# Patient Record
Sex: FEMALE | Race: WHITE | Employment: UNEMPLOYED | URBAN - NONMETROPOLITAN AREA
[De-identification: names, ages, dates, MRNs, and addresses within clinical notes are randomized per-mention and may not be internally consistent; named-entity substitution may affect disease eponyms.]

---

## 2017-01-10 ENCOUNTER — OFFICE VISIT (OUTPATIENT)
Dept: INTERNAL MEDICINE | Age: 82
End: 2017-01-10

## 2017-01-10 VITALS
HEART RATE: 68 BPM | RESPIRATION RATE: 14 BRPM | BODY MASS INDEX: 19.7 KG/M2 | HEIGHT: 64 IN | WEIGHT: 115.4 LBS | TEMPERATURE: 98 F | SYSTOLIC BLOOD PRESSURE: 120 MMHG | DIASTOLIC BLOOD PRESSURE: 62 MMHG | OXYGEN SATURATION: 98 %

## 2017-01-10 DIAGNOSIS — J40 BRONCHITIS: Primary | ICD-10-CM

## 2017-01-10 PROCEDURE — 99213 OFFICE O/P EST LOW 20 MIN: CPT | Performed by: NURSE PRACTITIONER

## 2017-01-10 RX ORDER — AZITHROMYCIN 250 MG/1
TABLET, FILM COATED ORAL
Qty: 1 PACKET | Refills: 0 | Status: SHIPPED | OUTPATIENT
Start: 2017-01-10 | End: 2017-02-22

## 2017-01-10 ASSESSMENT — ENCOUNTER SYMPTOMS
PHOTOPHOBIA: 0
DIARRHEA: 0
VOICE CHANGE: 0
VOMITING: 0
SORE THROAT: 1
SHORTNESS OF BREATH: 0
NAUSEA: 0
RHINORRHEA: 1
SINUS PRESSURE: 0
COUGH: 1
WHEEZING: 0

## 2017-02-06 DIAGNOSIS — E78.5 HYPERLIPIDEMIA: ICD-10-CM

## 2017-02-06 RX ORDER — SIMVASTATIN 10 MG
TABLET ORAL
Qty: 90 TABLET | Refills: 3 | Status: SHIPPED | OUTPATIENT
Start: 2017-02-06 | End: 2018-02-19 | Stop reason: SDUPTHER

## 2017-02-22 ENCOUNTER — OFFICE VISIT (OUTPATIENT)
Dept: INTERNAL MEDICINE | Age: 82
End: 2017-02-22

## 2017-02-22 VITALS
DIASTOLIC BLOOD PRESSURE: 72 MMHG | BODY MASS INDEX: 19.39 KG/M2 | WEIGHT: 113.6 LBS | HEART RATE: 64 BPM | HEIGHT: 64 IN | SYSTOLIC BLOOD PRESSURE: 104 MMHG

## 2017-02-22 DIAGNOSIS — M81.0 OSTEOPOROSIS: ICD-10-CM

## 2017-02-22 DIAGNOSIS — E78.5 HYPERLIPIDEMIA, UNSPECIFIED HYPERLIPIDEMIA TYPE: ICD-10-CM

## 2017-02-22 DIAGNOSIS — I10 ESSENTIAL HYPERTENSION: Primary | ICD-10-CM

## 2017-02-22 PROCEDURE — 4040F PNEUMOC VAC/ADMIN/RCVD: CPT | Performed by: INTERNAL MEDICINE

## 2017-02-22 PROCEDURE — G8399 PT W/DXA RESULTS DOCUMENT: HCPCS | Performed by: INTERNAL MEDICINE

## 2017-02-22 PROCEDURE — 1123F ACP DISCUSS/DSCN MKR DOCD: CPT | Performed by: INTERNAL MEDICINE

## 2017-02-22 PROCEDURE — 1036F TOBACCO NON-USER: CPT | Performed by: INTERNAL MEDICINE

## 2017-02-22 PROCEDURE — G8419 CALC BMI OUT NRM PARAM NOF/U: HCPCS | Performed by: INTERNAL MEDICINE

## 2017-02-22 PROCEDURE — 4005F PHARM THX FOR OP RXD: CPT | Performed by: INTERNAL MEDICINE

## 2017-02-22 PROCEDURE — G8427 DOCREV CUR MEDS BY ELIG CLIN: HCPCS | Performed by: INTERNAL MEDICINE

## 2017-02-22 PROCEDURE — 99214 OFFICE O/P EST MOD 30 MIN: CPT | Performed by: INTERNAL MEDICINE

## 2017-02-22 PROCEDURE — 1090F PRES/ABSN URINE INCON ASSESS: CPT | Performed by: INTERNAL MEDICINE

## 2017-02-22 PROCEDURE — G8484 FLU IMMUNIZE NO ADMIN: HCPCS | Performed by: INTERNAL MEDICINE

## 2017-02-22 ASSESSMENT — ENCOUNTER SYMPTOMS
DIARRHEA: 0
NAUSEA: 0
CONSTIPATION: 0
BACK PAIN: 0
BLOOD IN STOOL: 0
COUGH: 0
VOMITING: 0
EYE PAIN: 0
ABDOMINAL PAIN: 0
SHORTNESS OF BREATH: 0

## 2017-04-06 RX ORDER — CLONIDINE HYDROCHLORIDE 0.2 MG/1
TABLET ORAL
Qty: 180 TABLET | Refills: 3 | Status: SHIPPED | OUTPATIENT
Start: 2017-04-06 | End: 2018-04-06 | Stop reason: SDUPTHER

## 2017-05-11 DIAGNOSIS — F41.1 ANXIETY NEUROSIS: ICD-10-CM

## 2017-05-11 DIAGNOSIS — M81.0 OSTEOPOROSIS: ICD-10-CM

## 2017-05-11 RX ORDER — ALENDRONATE SODIUM 70 MG/1
TABLET ORAL
Qty: 12 TABLET | Refills: 3 | Status: SHIPPED | OUTPATIENT
Start: 2017-05-11 | End: 2018-06-22 | Stop reason: SDUPTHER

## 2017-05-11 RX ORDER — PAROXETINE HYDROCHLORIDE 20 MG/1
TABLET, FILM COATED ORAL
Qty: 90 TABLET | Refills: 3 | Status: SHIPPED | OUTPATIENT
Start: 2017-05-11 | End: 2018-06-22 | Stop reason: SDUPTHER

## 2017-07-10 ENCOUNTER — OFFICE VISIT (OUTPATIENT)
Dept: PRIMARY CARE CLINIC | Age: 82
End: 2017-07-10
Payer: MEDICARE

## 2017-07-10 VITALS
RESPIRATION RATE: 16 BRPM | SYSTOLIC BLOOD PRESSURE: 100 MMHG | TEMPERATURE: 97.3 F | HEIGHT: 64 IN | DIASTOLIC BLOOD PRESSURE: 60 MMHG | WEIGHT: 113 LBS | HEART RATE: 52 BPM | BODY MASS INDEX: 19.29 KG/M2 | OXYGEN SATURATION: 97 %

## 2017-07-10 DIAGNOSIS — L03.011 PARONYCHIA OF RIGHT THUMB: Primary | ICD-10-CM

## 2017-07-10 PROCEDURE — 4040F PNEUMOC VAC/ADMIN/RCVD: CPT | Performed by: FAMILY MEDICINE

## 2017-07-10 PROCEDURE — G8420 CALC BMI NORM PARAMETERS: HCPCS | Performed by: FAMILY MEDICINE

## 2017-07-10 PROCEDURE — 1036F TOBACCO NON-USER: CPT | Performed by: FAMILY MEDICINE

## 2017-07-10 PROCEDURE — 1123F ACP DISCUSS/DSCN MKR DOCD: CPT | Performed by: FAMILY MEDICINE

## 2017-07-10 PROCEDURE — G8399 PT W/DXA RESULTS DOCUMENT: HCPCS | Performed by: FAMILY MEDICINE

## 2017-07-10 PROCEDURE — G8427 DOCREV CUR MEDS BY ELIG CLIN: HCPCS | Performed by: FAMILY MEDICINE

## 2017-07-10 PROCEDURE — 99213 OFFICE O/P EST LOW 20 MIN: CPT | Performed by: FAMILY MEDICINE

## 2017-07-10 PROCEDURE — 1090F PRES/ABSN URINE INCON ASSESS: CPT | Performed by: FAMILY MEDICINE

## 2017-07-10 RX ORDER — DOXYCYCLINE HYCLATE 100 MG
100 TABLET ORAL 2 TIMES DAILY
Qty: 20 TABLET | Refills: 0 | Status: SHIPPED | OUTPATIENT
Start: 2017-07-10 | End: 2017-08-24 | Stop reason: ALTCHOICE

## 2017-07-10 ASSESSMENT — ENCOUNTER SYMPTOMS
GASTROINTESTINAL NEGATIVE: 1
COLOR CHANGE: 1
RESPIRATORY NEGATIVE: 1
EYES NEGATIVE: 1

## 2017-08-17 ENCOUNTER — HOSPITAL ENCOUNTER (OUTPATIENT)
Dept: LAB | Age: 82
Discharge: HOME | End: 2017-08-17
Payer: MEDICARE

## 2017-08-17 DIAGNOSIS — E78.5 HYPERLIPIDEMIA, UNSPECIFIED HYPERLIPIDEMIA TYPE: ICD-10-CM

## 2017-08-17 DIAGNOSIS — M81.0 OSTEOPOROSIS: ICD-10-CM

## 2017-08-17 DIAGNOSIS — I10 ESSENTIAL HYPERTENSION: ICD-10-CM

## 2017-08-17 LAB
ALBUMIN SERPL-MCNC: 4.2 G/DL (ref 3.5–5.2)
ALBUMIN/GLOBULIN RATIO: 1.1 (ref 1–2.5)
ALP BLD-CCNC: 66 U/L (ref 35–104)
ALT SERPL-CCNC: 13 U/L (ref 5–33)
ANION GAP SERPL CALCULATED.3IONS-SCNC: 11 MMOL/L (ref 9–17)
AST SERPL-CCNC: 30 U/L
BILIRUB SERPL-MCNC: 0.48 MG/DL (ref 0.3–1.2)
BUN BLDV-MCNC: 24 MG/DL (ref 8–23)
BUN/CREAT BLD: 28 (ref 9–20)
CALCIUM SERPL-MCNC: 9.7 MG/DL (ref 8.6–10.4)
CHLORIDE BLD-SCNC: 101 MMOL/L (ref 98–107)
CHOLESTEROL/HDL RATIO: 2.4
CHOLESTEROL: 189 MG/DL
CO2: 30 MMOL/L (ref 20–31)
CREAT SERPL-MCNC: 0.85 MG/DL (ref 0.5–0.9)
GFR AFRICAN AMERICAN: >60 ML/MIN
GFR NON-AFRICAN AMERICAN: >60 ML/MIN
GFR SERPL CREATININE-BSD FRML MDRD: ABNORMAL ML/MIN/{1.73_M2}
GFR SERPL CREATININE-BSD FRML MDRD: ABNORMAL ML/MIN/{1.73_M2}
GLUCOSE BLD-MCNC: 104 MG/DL (ref 70–99)
HDLC SERPL-MCNC: 80 MG/DL
LDL CHOLESTEROL: 84 MG/DL (ref 0–130)
POTASSIUM SERPL-SCNC: 4.5 MMOL/L (ref 3.7–5.3)
SODIUM BLD-SCNC: 142 MMOL/L (ref 135–144)
TOTAL PROTEIN: 8.1 G/DL (ref 6.4–8.3)
TRIGL SERPL-MCNC: 125 MG/DL
VLDLC SERPL CALC-MCNC: NORMAL MG/DL (ref 1–30)

## 2017-08-17 PROCEDURE — 80053 COMPREHEN METABOLIC PANEL: CPT

## 2017-08-17 PROCEDURE — 36415 COLL VENOUS BLD VENIPUNCTURE: CPT

## 2017-08-17 PROCEDURE — 80061 LIPID PANEL: CPT

## 2017-08-24 ENCOUNTER — OFFICE VISIT (OUTPATIENT)
Dept: INTERNAL MEDICINE | Age: 82
End: 2017-08-24
Payer: MEDICARE

## 2017-08-24 VITALS
HEIGHT: 64 IN | HEART RATE: 68 BPM | SYSTOLIC BLOOD PRESSURE: 106 MMHG | DIASTOLIC BLOOD PRESSURE: 68 MMHG | BODY MASS INDEX: 19.29 KG/M2 | WEIGHT: 113 LBS | RESPIRATION RATE: 16 BRPM

## 2017-08-24 DIAGNOSIS — I10 ESSENTIAL HYPERTENSION: Primary | ICD-10-CM

## 2017-08-24 DIAGNOSIS — E78.5 HYPERLIPIDEMIA, UNSPECIFIED HYPERLIPIDEMIA TYPE: ICD-10-CM

## 2017-08-24 DIAGNOSIS — F41.1 ANXIETY NEUROSIS: ICD-10-CM

## 2017-08-24 DIAGNOSIS — M81.0 OSTEOPOROSIS, UNSPECIFIED OSTEOPOROSIS TYPE, UNSPECIFIED PATHOLOGICAL FRACTURE PRESENCE: ICD-10-CM

## 2017-08-24 DIAGNOSIS — Z12.31 ENCOUNTER FOR SCREENING MAMMOGRAM FOR MALIGNANT NEOPLASM OF BREAST: ICD-10-CM

## 2017-08-24 PROCEDURE — G8420 CALC BMI NORM PARAMETERS: HCPCS | Performed by: INTERNAL MEDICINE

## 2017-08-24 PROCEDURE — 4040F PNEUMOC VAC/ADMIN/RCVD: CPT | Performed by: INTERNAL MEDICINE

## 2017-08-24 PROCEDURE — 1123F ACP DISCUSS/DSCN MKR DOCD: CPT | Performed by: INTERNAL MEDICINE

## 2017-08-24 PROCEDURE — 1090F PRES/ABSN URINE INCON ASSESS: CPT | Performed by: INTERNAL MEDICINE

## 2017-08-24 PROCEDURE — 1036F TOBACCO NON-USER: CPT | Performed by: INTERNAL MEDICINE

## 2017-08-24 PROCEDURE — 4005F PHARM THX FOR OP RXD: CPT | Performed by: INTERNAL MEDICINE

## 2017-08-24 PROCEDURE — 99214 OFFICE O/P EST MOD 30 MIN: CPT | Performed by: INTERNAL MEDICINE

## 2017-08-24 PROCEDURE — G8399 PT W/DXA RESULTS DOCUMENT: HCPCS | Performed by: INTERNAL MEDICINE

## 2017-08-24 PROCEDURE — G8427 DOCREV CUR MEDS BY ELIG CLIN: HCPCS | Performed by: INTERNAL MEDICINE

## 2017-08-24 ASSESSMENT — PATIENT HEALTH QUESTIONNAIRE - PHQ9
2. FEELING DOWN, DEPRESSED OR HOPELESS: 0
SUM OF ALL RESPONSES TO PHQ9 QUESTIONS 1 & 2: 0
1. LITTLE INTEREST OR PLEASURE IN DOING THINGS: 0
SUM OF ALL RESPONSES TO PHQ QUESTIONS 1-9: 0

## 2017-08-24 ASSESSMENT — ENCOUNTER SYMPTOMS
ABDOMINAL PAIN: 0
NAUSEA: 0
BLOOD IN STOOL: 0
VOMITING: 0
EYE PAIN: 0
SHORTNESS OF BREATH: 0
BACK PAIN: 0
DIARRHEA: 0
COUGH: 0
CONSTIPATION: 0

## 2017-09-19 ENCOUNTER — HOSPITAL ENCOUNTER (OUTPATIENT)
Dept: MAMMOGRAPHY | Age: 82
Discharge: HOME | End: 2017-09-19
Payer: MEDICARE

## 2017-09-19 DIAGNOSIS — Z12.31 ENCOUNTER FOR SCREENING MAMMOGRAM FOR MALIGNANT NEOPLASM OF BREAST: ICD-10-CM

## 2017-09-19 PROCEDURE — G0202 SCR MAMMO BI INCL CAD: HCPCS

## 2017-11-02 ENCOUNTER — NURSE ONLY (OUTPATIENT)
Dept: LAB | Age: 82
End: 2017-11-02
Payer: MEDICARE

## 2017-11-02 DIAGNOSIS — Z23 NEED FOR VACCINATION: Primary | ICD-10-CM

## 2017-11-02 PROCEDURE — G0008 ADMIN INFLUENZA VIRUS VAC: HCPCS | Performed by: INTERNAL MEDICINE

## 2017-11-02 PROCEDURE — 90662 IIV NO PRSV INCREASED AG IM: CPT | Performed by: INTERNAL MEDICINE

## 2017-11-02 NOTE — PROGRESS NOTES
Have you had an allergic reaction to the flu (influenza) shot? no  Are you allergic to eggs or any component of the flu vaccine? no  Do you have a history of Guillain-Paullina Syndrome (GBS), which is paralysis after receiving the flu vaccine? no  Are you feeling well today? yes  Flu vaccine given as ordered. Patient tolerated it well. No questions re: VIS information.

## 2018-03-01 ENCOUNTER — OFFICE VISIT (OUTPATIENT)
Dept: INTERNAL MEDICINE | Age: 83
End: 2018-03-01
Payer: MEDICARE

## 2018-03-01 VITALS
HEART RATE: 64 BPM | DIASTOLIC BLOOD PRESSURE: 72 MMHG | HEIGHT: 64 IN | SYSTOLIC BLOOD PRESSURE: 120 MMHG | RESPIRATION RATE: 16 BRPM | WEIGHT: 114 LBS | BODY MASS INDEX: 19.46 KG/M2

## 2018-03-01 DIAGNOSIS — E78.5 HYPERLIPIDEMIA, UNSPECIFIED HYPERLIPIDEMIA TYPE: ICD-10-CM

## 2018-03-01 DIAGNOSIS — I10 ESSENTIAL HYPERTENSION: Primary | ICD-10-CM

## 2018-03-01 DIAGNOSIS — M81.0 OSTEOPOROSIS, UNSPECIFIED OSTEOPOROSIS TYPE, UNSPECIFIED PATHOLOGICAL FRACTURE PRESENCE: ICD-10-CM

## 2018-03-01 PROCEDURE — G8427 DOCREV CUR MEDS BY ELIG CLIN: HCPCS | Performed by: INTERNAL MEDICINE

## 2018-03-01 PROCEDURE — 99214 OFFICE O/P EST MOD 30 MIN: CPT | Performed by: INTERNAL MEDICINE

## 2018-03-01 PROCEDURE — 4040F PNEUMOC VAC/ADMIN/RCVD: CPT | Performed by: INTERNAL MEDICINE

## 2018-03-01 PROCEDURE — 1036F TOBACCO NON-USER: CPT | Performed by: INTERNAL MEDICINE

## 2018-03-01 PROCEDURE — G8399 PT W/DXA RESULTS DOCUMENT: HCPCS | Performed by: INTERNAL MEDICINE

## 2018-03-01 PROCEDURE — G8420 CALC BMI NORM PARAMETERS: HCPCS | Performed by: INTERNAL MEDICINE

## 2018-03-01 PROCEDURE — 1123F ACP DISCUSS/DSCN MKR DOCD: CPT | Performed by: INTERNAL MEDICINE

## 2018-03-01 PROCEDURE — 1090F PRES/ABSN URINE INCON ASSESS: CPT | Performed by: INTERNAL MEDICINE

## 2018-03-01 PROCEDURE — G8484 FLU IMMUNIZE NO ADMIN: HCPCS | Performed by: INTERNAL MEDICINE

## 2018-03-01 ASSESSMENT — ENCOUNTER SYMPTOMS
COUGH: 0
BACK PAIN: 0
ABDOMINAL PAIN: 0
DIARRHEA: 0
SHORTNESS OF BREATH: 0
NAUSEA: 0
VOMITING: 0
CONSTIPATION: 0
BLOOD IN STOOL: 0
EYE PAIN: 0

## 2018-03-01 NOTE — PROGRESS NOTES
MCG/ACT nasal spray 1 spray each nostril daily. 1 Bottle 2    calcium carbonate-vitamin D (CALTRATE 600+D) 600-400 MG-UNIT TABS per tab Take 1 tablet by mouth 2 times daily.  Biotin 5000 MCG TABS Take 5,000 mcg by mouth daily.  Multiple Vitamins-Minerals (MULTIVITAMIN PO) Take 1 tablet by mouth daily.  Ibuprofen (ADVIL PO) Take 400 mg by mouth 3 times daily as needed.  aspirin 81 MG tablet Take 81 mg by mouth daily. No current facility-administered medications for this visit. Allergies   Allergen Reactions    Bactrim [Sulfamethoxazole-Trimethoprim]     Flagyl [Metronidazole]        Health Maintenance   Topic Date Due    Shingles Vaccine (1 of 2 - 2 Dose Series) 09/23/1982    DTaP/Tdap/Td vaccine (1 - Tdap) 08/24/2018 (Originally 9/23/1951)    DEXA (modify frequency per FRAX score)  Completed    Flu vaccine  Completed    Pneumococcal low/med risk  Completed       Subjective:      Review of Systems   Constitutional: Negative for chills and fever. HENT: Negative for hearing loss. Eyes: Negative for pain and visual disturbance. Respiratory: Negative for cough and shortness of breath. Cardiovascular: Negative for chest pain, palpitations and leg swelling. Gastrointestinal: Negative for abdominal pain, blood in stool, constipation, diarrhea, nausea and vomiting. Endocrine: Negative for cold intolerance, polydipsia and polyuria. Genitourinary: Negative for difficulty urinating, dysuria and hematuria. Musculoskeletal: Negative for arthralgias, back pain, gait problem and neck pain. Skin: Negative for pallor and rash. Neurological: Negative for dizziness, weakness, numbness and headaches. Hematological: Negative for adenopathy. Does not bruise/bleed easily. Psychiatric/Behavioral: Negative for confusion. The patient is not nervous/anxious. Objective:     Physical Exam   Constitutional: She is oriented to person, place, and time.  She appears

## 2018-04-06 RX ORDER — CLONIDINE HYDROCHLORIDE 0.2 MG/1
TABLET ORAL
Qty: 180 TABLET | Refills: 3 | Status: SHIPPED | OUTPATIENT
Start: 2018-04-06 | End: 2019-04-08 | Stop reason: SDUPTHER

## 2018-04-09 ENCOUNTER — HOSPITAL ENCOUNTER (OUTPATIENT)
Age: 83
Setting detail: OBSERVATION
Discharge: HOME | End: 2018-04-10
Attending: INTERNAL MEDICINE | Admitting: INTERNAL MEDICINE
Payer: MEDICARE

## 2018-04-09 ENCOUNTER — OFFICE VISIT (OUTPATIENT)
Dept: PRIMARY CARE CLINIC | Age: 83
End: 2018-04-09
Payer: MEDICARE

## 2018-04-09 ENCOUNTER — TELEPHONE (OUTPATIENT)
Dept: FAMILY MEDICINE CLINIC | Age: 83
End: 2018-04-09

## 2018-04-09 ENCOUNTER — HOSPITAL ENCOUNTER (OUTPATIENT)
Dept: GENERAL RADIOLOGY | Age: 83
Discharge: HOME | End: 2018-04-11
Payer: MEDICARE

## 2018-04-09 VITALS
HEIGHT: 64 IN | WEIGHT: 116 LBS | SYSTOLIC BLOOD PRESSURE: 132 MMHG | BODY MASS INDEX: 19.81 KG/M2 | OXYGEN SATURATION: 95 % | HEART RATE: 81 BPM | DIASTOLIC BLOOD PRESSURE: 78 MMHG | RESPIRATION RATE: 16 BRPM

## 2018-04-09 DIAGNOSIS — S40.021A ARM BRUISE, RIGHT, INITIAL ENCOUNTER: ICD-10-CM

## 2018-04-09 DIAGNOSIS — M25.551 PAIN OF RIGHT HIP JOINT: Primary | ICD-10-CM

## 2018-04-09 DIAGNOSIS — M25.551 PAIN OF RIGHT HIP JOINT: ICD-10-CM

## 2018-04-09 DIAGNOSIS — S32.591A PUBIC RAMUS FRACTURE, RIGHT, CLOSED, INITIAL ENCOUNTER (HCC): Primary | ICD-10-CM

## 2018-04-09 DIAGNOSIS — M81.0 OSTEOPOROSIS, UNSPECIFIED OSTEOPOROSIS TYPE, UNSPECIFIED PATHOLOGICAL FRACTURE PRESENCE: ICD-10-CM

## 2018-04-09 LAB
-: NORMAL
ABSOLUTE EOS #: 0.1 K/UL (ref 0–0.4)
ABSOLUTE IMMATURE GRANULOCYTE: ABNORMAL K/UL (ref 0–0.3)
ABSOLUTE LYMPH #: 0.7 K/UL (ref 1–4.8)
ABSOLUTE MONO #: 0.5 K/UL (ref 0.1–1.2)
AMORPHOUS: NORMAL
ANION GAP SERPL CALCULATED.3IONS-SCNC: 8 MMOL/L (ref 9–17)
BACTERIA: NORMAL
BASOPHILS # BLD: 1 % (ref 0–1)
BASOPHILS ABSOLUTE: 0 K/UL (ref 0–0.2)
BILIRUBIN URINE: NEGATIVE
BUN BLDV-MCNC: 24 MG/DL (ref 8–23)
BUN/CREAT BLD: 32 (ref 9–20)
CALCIUM SERPL-MCNC: 9.9 MG/DL (ref 8.6–10.4)
CASTS UA: NORMAL /LPF (ref 0–2)
CHLORIDE BLD-SCNC: 101 MMOL/L (ref 98–107)
CO2: 32 MMOL/L (ref 20–31)
COLOR: ABNORMAL
COMMENT UA: ABNORMAL
CREAT SERPL-MCNC: 0.74 MG/DL (ref 0.5–0.9)
CRYSTALS, UA: NORMAL /HPF
DIFFERENTIAL TYPE: ABNORMAL
EOSINOPHILS RELATIVE PERCENT: 1 % (ref 1–7)
EPITHELIAL CELLS UA: NORMAL /HPF (ref 0–5)
GFR AFRICAN AMERICAN: >60 ML/MIN
GFR NON-AFRICAN AMERICAN: >60 ML/MIN
GFR SERPL CREATININE-BSD FRML MDRD: ABNORMAL ML/MIN/{1.73_M2}
GFR SERPL CREATININE-BSD FRML MDRD: ABNORMAL ML/MIN/{1.73_M2}
GLUCOSE BLD-MCNC: 102 MG/DL (ref 70–99)
GLUCOSE URINE: NEGATIVE
HCT VFR BLD CALC: 37.8 % (ref 36–46)
HEMOGLOBIN: 12.7 G/DL (ref 12–16)
IMMATURE GRANULOCYTES: ABNORMAL %
INR BLD: 1
KETONES, URINE: NEGATIVE
LEUKOCYTE ESTERASE, URINE: NEGATIVE
LYMPHOCYTES # BLD: 10 % (ref 16–46)
MAGNESIUM: 2.3 MG/DL (ref 1.6–2.6)
MCH RBC QN AUTO: 31.2 PG (ref 26–34)
MCHC RBC AUTO-ENTMCNC: 33.5 G/DL (ref 31–37)
MCV RBC AUTO: 93.1 FL (ref 80–100)
MONOCYTES # BLD: 7 % (ref 4–11)
MUCUS: NORMAL
NITRITE, URINE: NEGATIVE
NRBC AUTOMATED: ABNORMAL PER 100 WBC
OTHER OBSERVATIONS UA: NORMAL
PARTIAL THROMBOPLASTIN TIME: 24.7 SEC (ref 27–35)
PDW BLD-RTO: 13.1 % (ref 11–14.5)
PH UA: 7 (ref 5–6)
PLATELET # BLD: 142 K/UL (ref 140–450)
PLATELET ESTIMATE: ABNORMAL
PMV BLD AUTO: 10.2 FL (ref 6–12)
POTASSIUM SERPL-SCNC: 4.7 MMOL/L (ref 3.7–5.3)
PROTEIN UA: NEGATIVE
PROTHROMBIN TIME: 10.4 SEC (ref 9.4–11.3)
RBC # BLD: 4.07 M/UL (ref 4–5.2)
RBC # BLD: ABNORMAL 10*6/UL
RBC UA: NORMAL /HPF (ref 0–4)
RENAL EPITHELIAL, UA: NORMAL /HPF
SEG NEUTROPHILS: 81 % (ref 43–77)
SEGMENTED NEUTROPHILS ABSOLUTE COUNT: 5.9 K/UL (ref 1.8–7.7)
SODIUM BLD-SCNC: 141 MMOL/L (ref 135–144)
SPECIFIC GRAVITY UA: 1.01 (ref 1.01–1.02)
TRICHOMONAS: NORMAL
TURBIDITY: ABNORMAL
URINE HGB: NEGATIVE
UROBILINOGEN, URINE: NORMAL
WBC # BLD: 7.2 K/UL (ref 3.5–11)
WBC # BLD: ABNORMAL 10*3/UL
WBC UA: NORMAL /HPF (ref 0–4)
YEAST: NORMAL

## 2018-04-09 PROCEDURE — 80048 BASIC METABOLIC PNL TOTAL CA: CPT

## 2018-04-09 PROCEDURE — 36415 COLL VENOUS BLD VENIPUNCTURE: CPT

## 2018-04-09 PROCEDURE — G0378 HOSPITAL OBSERVATION PER HR: HCPCS

## 2018-04-09 PROCEDURE — 1090F PRES/ABSN URINE INCON ASSESS: CPT | Performed by: FAMILY MEDICINE

## 2018-04-09 PROCEDURE — 73502 X-RAY EXAM HIP UNI 2-3 VIEWS: CPT

## 2018-04-09 PROCEDURE — 4040F PNEUMOC VAC/ADMIN/RCVD: CPT | Performed by: FAMILY MEDICINE

## 2018-04-09 PROCEDURE — G8399 PT W/DXA RESULTS DOCUMENT: HCPCS | Performed by: FAMILY MEDICINE

## 2018-04-09 PROCEDURE — 85730 THROMBOPLASTIN TIME PARTIAL: CPT

## 2018-04-09 PROCEDURE — 81001 URINALYSIS AUTO W/SCOPE: CPT

## 2018-04-09 PROCEDURE — 1123F ACP DISCUSS/DSCN MKR DOCD: CPT | Performed by: FAMILY MEDICINE

## 2018-04-09 PROCEDURE — 83735 ASSAY OF MAGNESIUM: CPT

## 2018-04-09 PROCEDURE — 85025 COMPLETE CBC W/AUTO DIFF WBC: CPT

## 2018-04-09 PROCEDURE — 6370000000 HC RX 637 (ALT 250 FOR IP): Performed by: INTERNAL MEDICINE

## 2018-04-09 PROCEDURE — G8420 CALC BMI NORM PARAMETERS: HCPCS | Performed by: FAMILY MEDICINE

## 2018-04-09 PROCEDURE — G8427 DOCREV CUR MEDS BY ELIG CLIN: HCPCS | Performed by: FAMILY MEDICINE

## 2018-04-09 PROCEDURE — 85610 PROTHROMBIN TIME: CPT

## 2018-04-09 PROCEDURE — 99220 PR INITIAL OBSERVATION CARE/DAY 70 MINUTES: CPT | Performed by: INTERNAL MEDICINE

## 2018-04-09 PROCEDURE — 1036F TOBACCO NON-USER: CPT | Performed by: FAMILY MEDICINE

## 2018-04-09 PROCEDURE — 99214 OFFICE O/P EST MOD 30 MIN: CPT | Performed by: FAMILY MEDICINE

## 2018-04-09 RX ORDER — MULTIVITAMIN WITH FOLIC ACID 400 MCG
1 TABLET ORAL DAILY
Status: DISCONTINUED | OUTPATIENT
Start: 2018-04-09 | End: 2018-04-10 | Stop reason: HOSPADM

## 2018-04-09 RX ORDER — CLONIDINE HYDROCHLORIDE 0.2 MG/1
0.2 TABLET ORAL 2 TIMES DAILY
Status: DISCONTINUED | OUTPATIENT
Start: 2018-04-09 | End: 2018-04-10 | Stop reason: HOSPADM

## 2018-04-09 RX ORDER — FLUTICASONE PROPIONATE 50 MCG
1 SPRAY, SUSPENSION (ML) NASAL DAILY
Status: DISCONTINUED | OUTPATIENT
Start: 2018-04-09 | End: 2018-04-10 | Stop reason: HOSPADM

## 2018-04-09 RX ORDER — PAROXETINE HYDROCHLORIDE 20 MG/1
20 TABLET, FILM COATED ORAL DAILY
Status: DISCONTINUED | OUTPATIENT
Start: 2018-04-09 | End: 2018-04-10 | Stop reason: HOSPADM

## 2018-04-09 RX ORDER — CALCIUM CARB/VITAMIN D3/VIT K1 500-500-40
1 TABLET,CHEWABLE ORAL DAILY
Status: DISCONTINUED | OUTPATIENT
Start: 2018-04-09 | End: 2018-04-09

## 2018-04-09 RX ORDER — SIMVASTATIN 10 MG
10 TABLET ORAL NIGHTLY
Status: DISCONTINUED | OUTPATIENT
Start: 2018-04-09 | End: 2018-04-10 | Stop reason: HOSPADM

## 2018-04-09 RX ORDER — ANTIARTHRITIC COMBINATION NO.2 900 MG
5000 TABLET ORAL DAILY
Status: DISCONTINUED | OUTPATIENT
Start: 2018-04-09 | End: 2018-04-10 | Stop reason: HOSPADM

## 2018-04-09 RX ORDER — HYDROCODONE BITARTRATE AND ACETAMINOPHEN 5; 325 MG/1; MG/1
2 TABLET ORAL EVERY 4 HOURS PRN
Status: DISCONTINUED | OUTPATIENT
Start: 2018-04-09 | End: 2018-04-10 | Stop reason: HOSPADM

## 2018-04-09 RX ORDER — ACETAMINOPHEN 325 MG/1
650 TABLET ORAL EVERY 4 HOURS PRN
Status: DISCONTINUED | OUTPATIENT
Start: 2018-04-09 | End: 2018-04-10 | Stop reason: HOSPADM

## 2018-04-09 RX ORDER — HYDROCODONE BITARTRATE AND ACETAMINOPHEN 5; 325 MG/1; MG/1
1 TABLET ORAL EVERY 4 HOURS PRN
Status: DISCONTINUED | OUTPATIENT
Start: 2018-04-09 | End: 2018-04-10 | Stop reason: HOSPADM

## 2018-04-09 RX ADMIN — HYDROCODONE BITARTRATE AND ACETAMINOPHEN 1 TABLET: 5; 325 TABLET ORAL at 14:58

## 2018-04-09 RX ADMIN — Medication 10 MG: at 21:36

## 2018-04-09 RX ADMIN — PAROXETINE HYDROCHLORIDE 20 MG: 20 TABLET, FILM COATED ORAL at 21:36

## 2018-04-09 RX ADMIN — CLONIDINE HYDROCHLORIDE 0.2 MG: 0.2 TABLET ORAL at 21:35

## 2018-04-09 ASSESSMENT — PAIN DESCRIPTION - PAIN TYPE
TYPE: ACUTE PAIN
TYPE: ACUTE PAIN

## 2018-04-09 ASSESSMENT — PAIN SCALES - GENERAL
PAINLEVEL_OUTOF10: 0
PAINLEVEL_OUTOF10: 0
PAINLEVEL_OUTOF10: 7
PAINLEVEL_OUTOF10: 5

## 2018-04-09 ASSESSMENT — PAIN DESCRIPTION - FREQUENCY
FREQUENCY: CONTINUOUS
FREQUENCY: INTERMITTENT

## 2018-04-09 ASSESSMENT — PAIN DESCRIPTION - DESCRIPTORS
DESCRIPTORS: ACHING
DESCRIPTORS: CONSTANT;ACHING;SHARP

## 2018-04-09 ASSESSMENT — PAIN DESCRIPTION - ORIENTATION: ORIENTATION: RIGHT

## 2018-04-09 ASSESSMENT — PAIN DESCRIPTION - LOCATION
LOCATION: HIP
LOCATION: PELVIS

## 2018-04-10 VITALS
HEIGHT: 64 IN | RESPIRATION RATE: 16 BRPM | BODY MASS INDEX: 19.53 KG/M2 | DIASTOLIC BLOOD PRESSURE: 51 MMHG | TEMPERATURE: 97.2 F | SYSTOLIC BLOOD PRESSURE: 102 MMHG | WEIGHT: 114.4 LBS | OXYGEN SATURATION: 93 % | HEART RATE: 57 BPM

## 2018-04-10 LAB
ABSOLUTE EOS #: 0.1 K/UL (ref 0–0.4)
ABSOLUTE IMMATURE GRANULOCYTE: ABNORMAL K/UL (ref 0–0.3)
ABSOLUTE LYMPH #: 0.8 K/UL (ref 1–4.8)
ABSOLUTE MONO #: 0.5 K/UL (ref 0.1–1.2)
ANION GAP SERPL CALCULATED.3IONS-SCNC: 9 MMOL/L (ref 9–17)
BASOPHILS # BLD: 1 % (ref 0–1)
BASOPHILS ABSOLUTE: 0 K/UL (ref 0–0.2)
BUN BLDV-MCNC: 19 MG/DL (ref 8–23)
BUN/CREAT BLD: 24 (ref 9–20)
CALCIUM SERPL-MCNC: 9.2 MG/DL (ref 8.6–10.4)
CHLORIDE BLD-SCNC: 101 MMOL/L (ref 98–107)
CO2: 30 MMOL/L (ref 20–31)
CREAT SERPL-MCNC: 0.8 MG/DL (ref 0.5–0.9)
DIFFERENTIAL TYPE: ABNORMAL
EOSINOPHILS RELATIVE PERCENT: 3 % (ref 1–7)
GFR AFRICAN AMERICAN: >60 ML/MIN
GFR NON-AFRICAN AMERICAN: >60 ML/MIN
GFR SERPL CREATININE-BSD FRML MDRD: ABNORMAL ML/MIN/{1.73_M2}
GFR SERPL CREATININE-BSD FRML MDRD: ABNORMAL ML/MIN/{1.73_M2}
GLUCOSE BLD-MCNC: 107 MG/DL (ref 70–99)
HCT VFR BLD CALC: 35.2 % (ref 36–46)
HEMOGLOBIN: 11.8 G/DL (ref 12–16)
IMMATURE GRANULOCYTES: ABNORMAL %
LYMPHOCYTES # BLD: 14 % (ref 16–46)
MCH RBC QN AUTO: 31.6 PG (ref 26–34)
MCHC RBC AUTO-ENTMCNC: 33.4 G/DL (ref 31–37)
MCV RBC AUTO: 94.5 FL (ref 80–100)
MONOCYTES # BLD: 9 % (ref 4–11)
NRBC AUTOMATED: ABNORMAL PER 100 WBC
PDW BLD-RTO: 13.4 % (ref 11–14.5)
PLATELET # BLD: 128 K/UL (ref 140–450)
PLATELET ESTIMATE: ABNORMAL
PMV BLD AUTO: 10.5 FL (ref 6–12)
POTASSIUM SERPL-SCNC: 4.7 MMOL/L (ref 3.7–5.3)
RBC # BLD: 3.72 M/UL (ref 4–5.2)
RBC # BLD: ABNORMAL 10*6/UL
SEG NEUTROPHILS: 73 % (ref 43–77)
SEGMENTED NEUTROPHILS ABSOLUTE COUNT: 4.4 K/UL (ref 1.8–7.7)
SODIUM BLD-SCNC: 140 MMOL/L (ref 135–144)
WBC # BLD: 5.9 K/UL (ref 3.5–11)
WBC # BLD: ABNORMAL 10*3/UL

## 2018-04-10 PROCEDURE — 80048 BASIC METABOLIC PNL TOTAL CA: CPT

## 2018-04-10 PROCEDURE — 36415 COLL VENOUS BLD VENIPUNCTURE: CPT

## 2018-04-10 PROCEDURE — 99217 PR OBSERVATION CARE DISCHARGE MANAGEMENT: CPT | Performed by: INTERNAL MEDICINE

## 2018-04-10 PROCEDURE — 6370000000 HC RX 637 (ALT 250 FOR IP): Performed by: INTERNAL MEDICINE

## 2018-04-10 PROCEDURE — 85025 COMPLETE CBC W/AUTO DIFF WBC: CPT

## 2018-04-10 PROCEDURE — 99219 PR INITIAL OBSERVATION CARE/DAY 50 MINUTES: CPT | Performed by: PHYSICIAN ASSISTANT

## 2018-04-10 PROCEDURE — G0378 HOSPITAL OBSERVATION PER HR: HCPCS

## 2018-04-10 PROCEDURE — 6370000000 HC RX 637 (ALT 250 FOR IP): Performed by: PHYSICIAN ASSISTANT

## 2018-04-10 RX ORDER — DOCUSATE SODIUM 100 MG/1
100 CAPSULE, LIQUID FILLED ORAL DAILY
Status: DISCONTINUED | OUTPATIENT
Start: 2018-04-10 | End: 2018-04-10 | Stop reason: HOSPADM

## 2018-04-10 RX ORDER — PSEUDOEPHEDRINE HCL 30 MG
100 TABLET ORAL 2 TIMES DAILY
Qty: 30 CAPSULE | Refills: 0 | COMMUNITY
Start: 2018-04-10 | End: 2021-08-25

## 2018-04-10 RX ORDER — HYDROCODONE BITARTRATE AND ACETAMINOPHEN 5; 325 MG/1; MG/1
1 TABLET ORAL EVERY 4 HOURS PRN
Qty: 12 TABLET | Refills: 0 | Status: SHIPPED | OUTPATIENT
Start: 2018-04-10 | End: 2018-04-18 | Stop reason: SDUPTHER

## 2018-04-10 RX ORDER — ACETAMINOPHEN 325 MG/1
650 TABLET ORAL EVERY 4 HOURS PRN
Qty: 120 TABLET | Refills: 0 | COMMUNITY
Start: 2018-04-10

## 2018-04-10 RX ADMIN — DOCUSATE SODIUM 100 MG: 100 CAPSULE, LIQUID FILLED ORAL at 08:23

## 2018-04-10 RX ADMIN — CLONIDINE HYDROCHLORIDE 0.2 MG: 0.2 TABLET ORAL at 08:23

## 2018-04-10 RX ADMIN — HYDROCODONE BITARTRATE AND ACETAMINOPHEN 2 TABLET: 5; 325 TABLET ORAL at 09:50

## 2018-04-10 RX ADMIN — THERA TABS 1 TABLET: TAB at 08:24

## 2018-04-10 RX ADMIN — HYDROCODONE BITARTRATE AND ACETAMINOPHEN 2 TABLET: 5; 325 TABLET ORAL at 13:31

## 2018-04-10 RX ADMIN — HYDROCODONE BITARTRATE AND ACETAMINOPHEN 1 TABLET: 5; 325 TABLET ORAL at 04:42

## 2018-04-10 ASSESSMENT — PAIN DESCRIPTION - ORIENTATION: ORIENTATION: RIGHT

## 2018-04-10 ASSESSMENT — PAIN SCALES - GENERAL
PAINLEVEL_OUTOF10: 0
PAINLEVEL_OUTOF10: 0
PAINLEVEL_OUTOF10: 7
PAINLEVEL_OUTOF10: 7
PAINLEVEL_OUTOF10: 6

## 2018-04-10 ASSESSMENT — PAIN DESCRIPTION - PAIN TYPE: TYPE: ACUTE PAIN

## 2018-04-10 ASSESSMENT — PAIN DESCRIPTION - DESCRIPTORS: DESCRIPTORS: ACHING

## 2018-04-10 ASSESSMENT — PAIN DESCRIPTION - LOCATION: LOCATION: HIP

## 2018-04-11 ENCOUNTER — HOSPITAL ENCOUNTER (OUTPATIENT)
Dept: PHYSICAL THERAPY | Age: 83
Setting detail: THERAPIES SERIES
Discharge: HOME | End: 2018-04-11
Payer: MEDICARE

## 2018-04-11 PROCEDURE — 97116 GAIT TRAINING THERAPY: CPT | Performed by: PHYSICAL THERAPIST

## 2018-04-11 PROCEDURE — G8978 MOBILITY CURRENT STATUS: HCPCS | Performed by: PHYSICAL THERAPIST

## 2018-04-11 PROCEDURE — 97161 PT EVAL LOW COMPLEX 20 MIN: CPT | Performed by: PHYSICAL THERAPIST

## 2018-04-11 PROCEDURE — G8979 MOBILITY GOAL STATUS: HCPCS | Performed by: PHYSICAL THERAPIST

## 2018-04-11 ASSESSMENT — PAIN DESCRIPTION - ORIENTATION: ORIENTATION: RIGHT

## 2018-04-11 ASSESSMENT — PAIN DESCRIPTION - ONSET: ONSET: ON-GOING

## 2018-04-11 ASSESSMENT — PAIN DESCRIPTION - PAIN TYPE: TYPE: ACUTE PAIN

## 2018-04-11 ASSESSMENT — PAIN DESCRIPTION - PROGRESSION: CLINICAL_PROGRESSION: NOT CHANGED

## 2018-04-11 ASSESSMENT — PAIN SCALES - GENERAL: PAINLEVEL_OUTOF10: 5

## 2018-04-11 ASSESSMENT — PAIN DESCRIPTION - DESCRIPTORS: DESCRIPTORS: ACHING;NAGGING

## 2018-04-11 ASSESSMENT — PAIN DESCRIPTION - FREQUENCY: FREQUENCY: CONTINUOUS

## 2018-04-11 ASSESSMENT — PAIN DESCRIPTION - LOCATION: LOCATION: BACK;HIP;GROIN

## 2018-04-12 ENCOUNTER — HOSPITAL ENCOUNTER (OUTPATIENT)
Dept: PHYSICAL THERAPY | Age: 83
Setting detail: THERAPIES SERIES
Discharge: HOME | End: 2018-04-12
Payer: MEDICARE

## 2018-04-12 PROCEDURE — 97110 THERAPEUTIC EXERCISES: CPT

## 2018-04-16 ENCOUNTER — HOSPITAL ENCOUNTER (OUTPATIENT)
Dept: PHYSICAL THERAPY | Age: 83
Setting detail: THERAPIES SERIES
Discharge: HOME | End: 2018-04-16
Payer: MEDICARE

## 2018-04-16 PROCEDURE — 97110 THERAPEUTIC EXERCISES: CPT

## 2018-04-18 ENCOUNTER — HOSPITAL ENCOUNTER (OUTPATIENT)
Dept: PHYSICAL THERAPY | Age: 83
Setting detail: THERAPIES SERIES
Discharge: HOME | End: 2018-04-18
Payer: MEDICARE

## 2018-04-18 ENCOUNTER — OFFICE VISIT (OUTPATIENT)
Dept: INTERNAL MEDICINE | Age: 83
End: 2018-04-18
Payer: MEDICARE

## 2018-04-18 VITALS
RESPIRATION RATE: 16 BRPM | BODY MASS INDEX: 19.94 KG/M2 | HEIGHT: 64 IN | TEMPERATURE: 98 F | WEIGHT: 116.8 LBS | HEART RATE: 72 BPM | DIASTOLIC BLOOD PRESSURE: 80 MMHG | OXYGEN SATURATION: 96 % | SYSTOLIC BLOOD PRESSURE: 126 MMHG

## 2018-04-18 DIAGNOSIS — M81.0 OSTEOPOROSIS, UNSPECIFIED OSTEOPOROSIS TYPE, UNSPECIFIED PATHOLOGICAL FRACTURE PRESENCE: Primary | ICD-10-CM

## 2018-04-18 DIAGNOSIS — I10 ESSENTIAL HYPERTENSION: ICD-10-CM

## 2018-04-18 DIAGNOSIS — S32.591A PUBIC RAMUS FRACTURE, RIGHT, CLOSED, INITIAL ENCOUNTER (HCC): ICD-10-CM

## 2018-04-18 DIAGNOSIS — R01.1 SYSTOLIC MURMUR: ICD-10-CM

## 2018-04-18 PROCEDURE — 1036F TOBACCO NON-USER: CPT | Performed by: NURSE PRACTITIONER

## 2018-04-18 PROCEDURE — 97110 THERAPEUTIC EXERCISES: CPT

## 2018-04-18 PROCEDURE — 1123F ACP DISCUSS/DSCN MKR DOCD: CPT | Performed by: NURSE PRACTITIONER

## 2018-04-18 PROCEDURE — 99214 OFFICE O/P EST MOD 30 MIN: CPT | Performed by: NURSE PRACTITIONER

## 2018-04-18 PROCEDURE — 1090F PRES/ABSN URINE INCON ASSESS: CPT | Performed by: NURSE PRACTITIONER

## 2018-04-18 PROCEDURE — G8427 DOCREV CUR MEDS BY ELIG CLIN: HCPCS | Performed by: NURSE PRACTITIONER

## 2018-04-18 PROCEDURE — G8399 PT W/DXA RESULTS DOCUMENT: HCPCS | Performed by: NURSE PRACTITIONER

## 2018-04-18 PROCEDURE — 4040F PNEUMOC VAC/ADMIN/RCVD: CPT | Performed by: NURSE PRACTITIONER

## 2018-04-18 PROCEDURE — G8420 CALC BMI NORM PARAMETERS: HCPCS | Performed by: NURSE PRACTITIONER

## 2018-04-18 RX ORDER — HYDROCODONE BITARTRATE AND ACETAMINOPHEN 5; 325 MG/1; MG/1
1 TABLET ORAL EVERY 6 HOURS PRN
Qty: 12 TABLET | Refills: 0 | Status: SHIPPED | OUTPATIENT
Start: 2018-04-18 | End: 2018-08-06 | Stop reason: SDUPTHER

## 2018-04-20 ENCOUNTER — HOSPITAL ENCOUNTER (OUTPATIENT)
Dept: PHYSICAL THERAPY | Age: 83
Setting detail: THERAPIES SERIES
Discharge: HOME | End: 2018-04-20
Payer: MEDICARE

## 2018-04-20 PROCEDURE — 97110 THERAPEUTIC EXERCISES: CPT

## 2018-04-20 ASSESSMENT — ENCOUNTER SYMPTOMS
ORTHOPNEA: 0
COUGH: 0
WHEEZING: 0
SPUTUM PRODUCTION: 0
ABDOMINAL PAIN: 0
EYE DISCHARGE: 0
EYE PAIN: 0
DIARRHEA: 0
EYE REDNESS: 0
SHORTNESS OF BREATH: 0
BLOOD IN STOOL: 0
CONSTIPATION: 0
NAUSEA: 0
VOMITING: 0

## 2018-04-23 ENCOUNTER — HOSPITAL ENCOUNTER (OUTPATIENT)
Dept: PHYSICAL THERAPY | Age: 83
Setting detail: THERAPIES SERIES
Discharge: HOME | End: 2018-04-23
Payer: MEDICARE

## 2018-04-23 PROCEDURE — 97110 THERAPEUTIC EXERCISES: CPT | Performed by: PHYSICAL THERAPIST

## 2018-04-25 ENCOUNTER — HOSPITAL ENCOUNTER (OUTPATIENT)
Dept: PHYSICAL THERAPY | Age: 83
Setting detail: THERAPIES SERIES
Discharge: HOME | End: 2018-04-25
Payer: MEDICARE

## 2018-04-25 PROCEDURE — 97110 THERAPEUTIC EXERCISES: CPT | Performed by: PHYSICAL THERAPIST

## 2018-04-27 ENCOUNTER — HOSPITAL ENCOUNTER (OUTPATIENT)
Dept: PHYSICAL THERAPY | Age: 83
Setting detail: THERAPIES SERIES
Discharge: HOME | End: 2018-04-27
Payer: MEDICARE

## 2018-04-27 PROCEDURE — 97110 THERAPEUTIC EXERCISES: CPT | Performed by: PHYSICAL THERAPIST

## 2018-04-30 ENCOUNTER — HOSPITAL ENCOUNTER (OUTPATIENT)
Dept: PHYSICAL THERAPY | Age: 83
Setting detail: THERAPIES SERIES
Discharge: HOME | End: 2018-04-30
Payer: MEDICARE

## 2018-04-30 PROCEDURE — 97110 THERAPEUTIC EXERCISES: CPT

## 2018-05-02 ENCOUNTER — HOSPITAL ENCOUNTER (OUTPATIENT)
Dept: PHYSICAL THERAPY | Age: 83
Setting detail: THERAPIES SERIES
Discharge: HOME | End: 2018-05-02
Payer: MEDICARE

## 2018-05-02 DIAGNOSIS — S32.591A PUBIC RAMUS FRACTURE, RIGHT, CLOSED, INITIAL ENCOUNTER (HCC): Primary | ICD-10-CM

## 2018-05-02 PROCEDURE — 97110 THERAPEUTIC EXERCISES: CPT

## 2018-05-04 ENCOUNTER — HOSPITAL ENCOUNTER (OUTPATIENT)
Dept: PHYSICAL THERAPY | Age: 83
Setting detail: THERAPIES SERIES
Discharge: HOME | End: 2018-05-04
Payer: MEDICARE

## 2018-05-04 PROCEDURE — 97110 THERAPEUTIC EXERCISES: CPT

## 2018-05-07 ENCOUNTER — HOSPITAL ENCOUNTER (OUTPATIENT)
Dept: PHYSICAL THERAPY | Age: 83
Setting detail: THERAPIES SERIES
Discharge: HOME | End: 2018-05-07
Payer: MEDICARE

## 2018-05-07 PROCEDURE — 97110 THERAPEUTIC EXERCISES: CPT | Performed by: PHYSICAL THERAPIST

## 2018-05-08 ENCOUNTER — HOSPITAL ENCOUNTER (OUTPATIENT)
Dept: GENERAL RADIOLOGY | Age: 83
Discharge: HOME | End: 2018-05-10
Payer: MEDICARE

## 2018-05-08 ENCOUNTER — OFFICE VISIT (OUTPATIENT)
Dept: ORTHOPEDIC SURGERY | Age: 83
End: 2018-05-08
Payer: MEDICARE

## 2018-05-08 VITALS
DIASTOLIC BLOOD PRESSURE: 69 MMHG | WEIGHT: 116 LBS | HEIGHT: 64 IN | SYSTOLIC BLOOD PRESSURE: 125 MMHG | HEART RATE: 54 BPM | BODY MASS INDEX: 19.81 KG/M2

## 2018-05-08 DIAGNOSIS — S32.591A PUBIC RAMUS FRACTURE, RIGHT, CLOSED, INITIAL ENCOUNTER (HCC): ICD-10-CM

## 2018-05-08 DIAGNOSIS — S32.591A PUBIC RAMUS FRACTURE, RIGHT, CLOSED, INITIAL ENCOUNTER (HCC): Primary | ICD-10-CM

## 2018-05-08 PROCEDURE — G8427 DOCREV CUR MEDS BY ELIG CLIN: HCPCS | Performed by: NURSE PRACTITIONER

## 2018-05-08 PROCEDURE — G8420 CALC BMI NORM PARAMETERS: HCPCS | Performed by: NURSE PRACTITIONER

## 2018-05-08 PROCEDURE — 1090F PRES/ABSN URINE INCON ASSESS: CPT | Performed by: NURSE PRACTITIONER

## 2018-05-08 PROCEDURE — 4040F PNEUMOC VAC/ADMIN/RCVD: CPT | Performed by: NURSE PRACTITIONER

## 2018-05-08 PROCEDURE — 1036F TOBACCO NON-USER: CPT | Performed by: NURSE PRACTITIONER

## 2018-05-08 PROCEDURE — 99212 OFFICE O/P EST SF 10 MIN: CPT | Performed by: NURSE PRACTITIONER

## 2018-05-08 PROCEDURE — 72170 X-RAY EXAM OF PELVIS: CPT

## 2018-05-08 PROCEDURE — G8399 PT W/DXA RESULTS DOCUMENT: HCPCS | Performed by: NURSE PRACTITIONER

## 2018-05-08 PROCEDURE — 1123F ACP DISCUSS/DSCN MKR DOCD: CPT | Performed by: NURSE PRACTITIONER

## 2018-05-09 ENCOUNTER — HOSPITAL ENCOUNTER (OUTPATIENT)
Dept: PHYSICAL THERAPY | Age: 83
Setting detail: THERAPIES SERIES
Discharge: HOME | End: 2018-05-09
Payer: MEDICARE

## 2018-05-09 ENCOUNTER — HOSPITAL ENCOUNTER (OUTPATIENT)
Dept: NON INVASIVE DIAGNOSTICS | Age: 83
Discharge: HOME | End: 2018-05-09
Payer: MEDICARE

## 2018-05-09 LAB
LV EF: 55 %
LVEF MODALITY: NORMAL

## 2018-05-09 PROCEDURE — 97110 THERAPEUTIC EXERCISES: CPT | Performed by: PHYSICAL THERAPIST

## 2018-05-09 PROCEDURE — 93306 TTE W/DOPPLER COMPLETE: CPT

## 2018-05-14 ENCOUNTER — HOSPITAL ENCOUNTER (OUTPATIENT)
Dept: PHYSICAL THERAPY | Age: 83
Setting detail: THERAPIES SERIES
Discharge: HOME | End: 2018-05-14
Payer: MEDICARE

## 2018-05-14 PROCEDURE — 97110 THERAPEUTIC EXERCISES: CPT

## 2018-05-16 ENCOUNTER — APPOINTMENT (OUTPATIENT)
Dept: PHYSICAL THERAPY | Age: 83
End: 2018-05-16
Payer: MEDICARE

## 2018-05-30 DIAGNOSIS — S32.591A PUBIC RAMUS FRACTURE, RIGHT, CLOSED, INITIAL ENCOUNTER (HCC): Primary | ICD-10-CM

## 2018-06-05 ENCOUNTER — HOSPITAL ENCOUNTER (OUTPATIENT)
Dept: GENERAL RADIOLOGY | Age: 83
Discharge: HOME | End: 2018-06-07
Payer: MEDICARE

## 2018-06-05 ENCOUNTER — OFFICE VISIT (OUTPATIENT)
Dept: ORTHOPEDIC SURGERY | Age: 83
End: 2018-06-05

## 2018-06-05 VITALS
BODY MASS INDEX: 19.81 KG/M2 | HEIGHT: 64 IN | DIASTOLIC BLOOD PRESSURE: 82 MMHG | HEART RATE: 63 BPM | SYSTOLIC BLOOD PRESSURE: 122 MMHG | WEIGHT: 116 LBS

## 2018-06-05 DIAGNOSIS — S32.591A PUBIC RAMUS FRACTURE, RIGHT, CLOSED, INITIAL ENCOUNTER (HCC): ICD-10-CM

## 2018-06-05 DIAGNOSIS — S32.591A PUBIC RAMUS FRACTURE, RIGHT, CLOSED, INITIAL ENCOUNTER (HCC): Primary | ICD-10-CM

## 2018-06-05 PROCEDURE — 99024 POSTOP FOLLOW-UP VISIT: CPT | Performed by: PHYSICIAN ASSISTANT

## 2018-06-05 PROCEDURE — 72170 X-RAY EXAM OF PELVIS: CPT

## 2018-06-22 DIAGNOSIS — M81.0 OSTEOPOROSIS: ICD-10-CM

## 2018-06-22 DIAGNOSIS — F41.1 ANXIETY NEUROSIS: ICD-10-CM

## 2018-06-22 RX ORDER — PAROXETINE HYDROCHLORIDE 20 MG/1
TABLET, FILM COATED ORAL
Qty: 90 TABLET | Refills: 3 | Status: SHIPPED | OUTPATIENT
Start: 2018-06-22 | End: 2019-07-19 | Stop reason: SDUPTHER

## 2018-06-22 RX ORDER — ALENDRONATE SODIUM 70 MG/1
TABLET ORAL
Qty: 12 TABLET | Refills: 3 | Status: ON HOLD | OUTPATIENT
Start: 2018-06-22 | End: 2021-09-16

## 2018-07-22 ENCOUNTER — APPOINTMENT (OUTPATIENT)
Dept: CT IMAGING | Age: 83
End: 2018-07-22
Payer: MEDICARE

## 2018-07-22 ENCOUNTER — HOSPITAL ENCOUNTER (EMERGENCY)
Age: 83
Discharge: ANOTHER HOSPITAL | End: 2018-07-22
Attending: EMERGENCY MEDICINE
Payer: MEDICARE

## 2018-07-22 VITALS
HEART RATE: 74 BPM | RESPIRATION RATE: 14 BRPM | WEIGHT: 116 LBS | DIASTOLIC BLOOD PRESSURE: 78 MMHG | SYSTOLIC BLOOD PRESSURE: 137 MMHG | OXYGEN SATURATION: 98 % | TEMPERATURE: 97.3 F | HEIGHT: 66 IN | BODY MASS INDEX: 18.64 KG/M2
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
## 2018-07-22 DIAGNOSIS — S12.191A OTHER CLOSED NONDISPLACED FRACTURE OF SECOND CERVICAL VERTEBRA, INITIAL ENCOUNTER (HCC): Primary | ICD-10-CM

## 2018-07-22 PROCEDURE — 6370000000 HC RX 637 (ALT 250 FOR IP): Performed by: EMERGENCY MEDICINE

## 2018-07-22 PROCEDURE — 70450 CT HEAD/BRAIN W/O DYE: CPT

## 2018-07-22 PROCEDURE — 99284 EMERGENCY DEPT VISIT MOD MDM: CPT

## 2018-07-22 PROCEDURE — 72125 CT NECK SPINE W/O DYE: CPT

## 2018-07-22 RX ORDER — HYDROCODONE BITARTRATE AND ACETAMINOPHEN 5; 325 MG/1; MG/1
1 TABLET ORAL ONCE
Status: COMPLETED | OUTPATIENT
Start: 2018-07-22 | End: 2018-07-22

## 2018-07-22 RX ADMIN — HYDROCODONE BITARTRATE AND ACETAMINOPHEN 1 TABLET: 5; 325 TABLET ORAL at 21:59

## 2018-07-22 ASSESSMENT — PAIN DESCRIPTION - PAIN TYPE: TYPE: ACUTE PAIN

## 2018-07-22 ASSESSMENT — PAIN SCALES - GENERAL
PAINLEVEL_OUTOF10: 2
PAINLEVEL_OUTOF10: 3
PAINLEVEL_OUTOF10: 7
PAINLEVEL_OUTOF10: 8
PAINLEVEL_OUTOF10: 8

## 2018-07-22 ASSESSMENT — PAIN DESCRIPTION - LOCATION: LOCATION: NECK

## 2018-07-22 ASSESSMENT — PAIN DESCRIPTION - ONSET: ONSET: SUDDEN

## 2018-07-22 ASSESSMENT — PAIN DESCRIPTION - ORIENTATION: ORIENTATION: RIGHT

## 2018-07-22 ASSESSMENT — PAIN SCALES - WONG BAKER: WONGBAKER_NUMERICALRESPONSE: 8

## 2018-07-22 NOTE — ED NOTES
Patient in car- placed in C-spine collar before placing in wheelchair to take patient in to ED due to neck pain.      Jennifer Van RN  07/22/18 0021

## 2018-07-22 NOTE — ED PROVIDER NOTES
specified.     DISCHARGE MEDICATIONS:  New Prescriptions    No medications on file       (Please note that portions of this note were completed with a voice recognition program.  Efforts were made to edit the dictations but occasionally words are mis-transcribed.)    Hernandez MD   Attending Emergency Physician         Rosemary Cantu MD  07/22/18 0850

## 2018-07-22 NOTE — ED TRIAGE NOTES
Patient states was in chair and  asleep when she fell out of chair. Patient landed on left side but patient reports right side of neck hurting. Patient able to get up and ambulate to cart. Patient denies hip or pelvis pain at this time but has had a pelvic FX in April of this year.

## 2018-07-23 ENCOUNTER — APPOINTMENT (OUTPATIENT)
Dept: MRI IMAGING | Age: 83
DRG: 552 | End: 2018-07-23
Payer: MEDICARE

## 2018-07-23 ENCOUNTER — HOSPITAL ENCOUNTER (INPATIENT)
Age: 83
LOS: 1 days | Discharge: NURSING, REHAB OR LONG-TERM CARE | DRG: 552 | End: 2018-07-24
Attending: EMERGENCY MEDICINE | Admitting: INTERNAL MEDICINE
Payer: MEDICARE

## 2018-07-23 DIAGNOSIS — S12.100A CLOSED DISPLACED FRACTURE OF SECOND CERVICAL VERTEBRA, UNSPECIFIED FRACTURE MORPHOLOGY, INITIAL ENCOUNTER (HCC): Primary | ICD-10-CM

## 2018-07-23 DIAGNOSIS — S12.100A TRAUMATIC CLOSED FRACTURE OF C2 VERTEBRA WITH MINIMAL DISPLACEMENT, INITIAL ENCOUNTER (HCC): ICD-10-CM

## 2018-07-23 LAB
-: ABNORMAL
ABSOLUTE EOS #: 0 K/UL (ref 0–0.44)
ABSOLUTE EOS #: <0.03 K/UL (ref 0–0.44)
ABSOLUTE IMMATURE GRANULOCYTE: 0 K/UL (ref 0–0.3)
ABSOLUTE IMMATURE GRANULOCYTE: 0.04 K/UL (ref 0–0.3)
ABSOLUTE LYMPH #: 0.67 K/UL (ref 1.1–3.7)
ABSOLUTE LYMPH #: 0.8 K/UL (ref 1.1–3.7)
ABSOLUTE MONO #: 0.44 K/UL (ref 0.1–1.2)
ABSOLUTE MONO #: 0.47 K/UL (ref 0.1–1.2)
ALBUMIN SERPL-MCNC: 4 G/DL (ref 3.5–5.2)
ALBUMIN/GLOBULIN RATIO: 1 (ref 1–2.5)
ALP BLD-CCNC: 79 U/L (ref 35–104)
ALT SERPL-CCNC: 12 U/L (ref 5–33)
AMMONIA: 27 UMOL/L (ref 11–51)
AMORPHOUS: ABNORMAL
ANION GAP SERPL CALCULATED.3IONS-SCNC: 11 MMOL/L (ref 9–17)
ANION GAP SERPL CALCULATED.3IONS-SCNC: 13 MMOL/L (ref 9–17)
AST SERPL-CCNC: 31 U/L
BACTERIA: ABNORMAL
BASOPHILS # BLD: 0 % (ref 0–2)
BASOPHILS # BLD: 0 % (ref 0–2)
BASOPHILS ABSOLUTE: 0 K/UL (ref 0–0.2)
BASOPHILS ABSOLUTE: 0.03 K/UL (ref 0–0.2)
BILIRUB SERPL-MCNC: 0.37 MG/DL (ref 0.3–1.2)
BILIRUBIN DIRECT: <0.08 MG/DL
BILIRUBIN URINE: NEGATIVE
BILIRUBIN, INDIRECT: NORMAL MG/DL (ref 0–1)
BUN BLDV-MCNC: 19 MG/DL (ref 8–23)
BUN BLDV-MCNC: 21 MG/DL (ref 8–23)
BUN/CREAT BLD: ABNORMAL (ref 9–20)
BUN/CREAT BLD: NORMAL (ref 9–20)
CALCIUM SERPL-MCNC: 8.8 MG/DL (ref 8.6–10.4)
CALCIUM SERPL-MCNC: 8.9 MG/DL (ref 8.6–10.4)
CASTS UA: ABNORMAL /LPF (ref 0–2)
CHLORIDE BLD-SCNC: 101 MMOL/L (ref 98–107)
CHLORIDE BLD-SCNC: 99 MMOL/L (ref 98–107)
CO2: 26 MMOL/L (ref 20–31)
CO2: 26 MMOL/L (ref 20–31)
COLOR: YELLOW
CREAT SERPL-MCNC: 0.73 MG/DL (ref 0.5–0.9)
CREAT SERPL-MCNC: 0.79 MG/DL (ref 0.5–0.9)
CRYSTALS, UA: ABNORMAL /HPF
DIFFERENTIAL TYPE: ABNORMAL
DIFFERENTIAL TYPE: ABNORMAL
EOSINOPHILS RELATIVE PERCENT: 0 % (ref 1–4)
EOSINOPHILS RELATIVE PERCENT: 0 % (ref 1–4)
EPITHELIAL CELLS UA: ABNORMAL /HPF (ref 0–5)
GFR AFRICAN AMERICAN: >60 ML/MIN
GFR AFRICAN AMERICAN: >60 ML/MIN
GFR NON-AFRICAN AMERICAN: >60 ML/MIN
GFR NON-AFRICAN AMERICAN: >60 ML/MIN
GFR SERPL CREATININE-BSD FRML MDRD: ABNORMAL ML/MIN/{1.73_M2}
GFR SERPL CREATININE-BSD FRML MDRD: ABNORMAL ML/MIN/{1.73_M2}
GFR SERPL CREATININE-BSD FRML MDRD: NORMAL ML/MIN/{1.73_M2}
GFR SERPL CREATININE-BSD FRML MDRD: NORMAL ML/MIN/{1.73_M2}
GLOBULIN: NORMAL G/DL (ref 1.5–3.8)
GLUCOSE BLD-MCNC: 106 MG/DL (ref 70–99)
GLUCOSE BLD-MCNC: 97 MG/DL (ref 70–99)
GLUCOSE URINE: NEGATIVE
HCT VFR BLD CALC: 38.2 % (ref 36.3–47.1)
HCT VFR BLD CALC: 40 % (ref 36.3–47.1)
HEMOGLOBIN: 12.5 G/DL (ref 11.9–15.1)
HEMOGLOBIN: 12.9 G/DL (ref 11.9–15.1)
IMMATURE GRANULOCYTES: 0 %
IMMATURE GRANULOCYTES: 0 %
KETONES, URINE: NEGATIVE
LEUKOCYTE ESTERASE, URINE: NEGATIVE
LYMPHOCYTES # BLD: 9 % (ref 24–43)
LYMPHOCYTES # BLD: 9 % (ref 24–43)
MCH RBC QN AUTO: 29.9 PG (ref 25.2–33.5)
MCH RBC QN AUTO: 30.3 PG (ref 25.2–33.5)
MCHC RBC AUTO-ENTMCNC: 32.3 G/DL (ref 28.4–34.8)
MCHC RBC AUTO-ENTMCNC: 32.7 G/DL (ref 28.4–34.8)
MCV RBC AUTO: 92.7 FL (ref 82.6–102.9)
MCV RBC AUTO: 92.8 FL (ref 82.6–102.9)
MONOCYTES # BLD: 5 % (ref 3–12)
MONOCYTES # BLD: 6 % (ref 3–12)
MORPHOLOGY: NORMAL
MUCUS: ABNORMAL
NITRITE, URINE: NEGATIVE
NRBC AUTOMATED: 0 PER 100 WBC
NRBC AUTOMATED: 0 PER 100 WBC
OTHER OBSERVATIONS UA: ABNORMAL
PDW BLD-RTO: 12.6 % (ref 11.8–14.4)
PDW BLD-RTO: 12.7 % (ref 11.8–14.4)
PH UA: 7 (ref 5–8)
PLATELET # BLD: 129 K/UL (ref 138–453)
PLATELET # BLD: 131 K/UL (ref 138–453)
PLATELET ESTIMATE: ABNORMAL
PLATELET ESTIMATE: ABNORMAL
PMV BLD AUTO: 12.3 FL (ref 8.1–13.5)
PMV BLD AUTO: 12.5 FL (ref 8.1–13.5)
POTASSIUM SERPL-SCNC: 3.7 MMOL/L (ref 3.7–5.3)
POTASSIUM SERPL-SCNC: 4.1 MMOL/L (ref 3.7–5.3)
PROTEIN UA: NEGATIVE
RBC # BLD: 4.12 M/UL (ref 3.95–5.11)
RBC # BLD: 4.31 M/UL (ref 3.95–5.11)
RBC # BLD: ABNORMAL 10*6/UL
RBC # BLD: ABNORMAL 10*6/UL
RBC UA: ABNORMAL /HPF (ref 0–2)
RENAL EPITHELIAL, UA: ABNORMAL /HPF
SEG NEUTROPHILS: 85 % (ref 36–65)
SEG NEUTROPHILS: 86 % (ref 36–65)
SEGMENTED NEUTROPHILS ABSOLUTE COUNT: 6.29 K/UL (ref 1.5–8.1)
SEGMENTED NEUTROPHILS ABSOLUTE COUNT: 7.63 K/UL (ref 1.5–8.1)
SODIUM BLD-SCNC: 136 MMOL/L (ref 135–144)
SODIUM BLD-SCNC: 140 MMOL/L (ref 135–144)
SPECIFIC GRAVITY UA: 1.01 (ref 1–1.03)
THYROXINE, FREE: 0.91 NG/DL (ref 0.93–1.7)
TOTAL PROTEIN: 8.2 G/DL (ref 6.4–8.3)
TRICHOMONAS: ABNORMAL
TSH SERPL DL<=0.05 MIU/L-ACNC: 3.65 MIU/L (ref 0.3–5)
TURBIDITY: CLEAR
URINE HGB: ABNORMAL
UROBILINOGEN, URINE: NORMAL
VITAMIN D 25-HYDROXY: 33.2 NG/ML (ref 30–100)
WBC # BLD: 7.4 K/UL (ref 3.5–11.3)
WBC # BLD: 9 K/UL (ref 3.5–11.3)
WBC # BLD: ABNORMAL 10*3/UL
WBC # BLD: ABNORMAL 10*3/UL
WBC UA: ABNORMAL /HPF (ref 0–5)
YEAST: ABNORMAL

## 2018-07-23 PROCEDURE — 87086 URINE CULTURE/COLONY COUNT: CPT

## 2018-07-23 PROCEDURE — 80076 HEPATIC FUNCTION PANEL: CPT

## 2018-07-23 PROCEDURE — 6360000002 HC RX W HCPCS: Performed by: STUDENT IN AN ORGANIZED HEALTH CARE EDUCATION/TRAINING PROGRAM

## 2018-07-23 PROCEDURE — G8987 SELF CARE CURRENT STATUS: HCPCS

## 2018-07-23 PROCEDURE — 84439 ASSAY OF FREE THYROXINE: CPT

## 2018-07-23 PROCEDURE — 99285 EMERGENCY DEPT VISIT HI MDM: CPT

## 2018-07-23 PROCEDURE — 97166 OT EVAL MOD COMPLEX 45 MIN: CPT

## 2018-07-23 PROCEDURE — 85025 COMPLETE CBC W/AUTO DIFF WBC: CPT

## 2018-07-23 PROCEDURE — 6360000004 HC RX CONTRAST MEDICATION: Performed by: STUDENT IN AN ORGANIZED HEALTH CARE EDUCATION/TRAINING PROGRAM

## 2018-07-23 PROCEDURE — G8978 MOBILITY CURRENT STATUS: HCPCS

## 2018-07-23 PROCEDURE — 97530 THERAPEUTIC ACTIVITIES: CPT

## 2018-07-23 PROCEDURE — 99223 1ST HOSP IP/OBS HIGH 75: CPT | Performed by: INTERNAL MEDICINE

## 2018-07-23 PROCEDURE — 6370000000 HC RX 637 (ALT 250 FOR IP): Performed by: STUDENT IN AN ORGANIZED HEALTH CARE EDUCATION/TRAINING PROGRAM

## 2018-07-23 PROCEDURE — 80048 BASIC METABOLIC PNL TOTAL CA: CPT

## 2018-07-23 PROCEDURE — 82306 VITAMIN D 25 HYDROXY: CPT

## 2018-07-23 PROCEDURE — 87040 BLOOD CULTURE FOR BACTERIA: CPT

## 2018-07-23 PROCEDURE — 97535 SELF CARE MNGMENT TRAINING: CPT

## 2018-07-23 PROCEDURE — 70549 MR ANGIOGRAPH NECK W/O&W/DYE: CPT

## 2018-07-23 PROCEDURE — 72141 MRI NECK SPINE W/O DYE: CPT

## 2018-07-23 PROCEDURE — 94762 N-INVAS EAR/PLS OXIMTRY CONT: CPT

## 2018-07-23 PROCEDURE — 81001 URINALYSIS AUTO W/SCOPE: CPT

## 2018-07-23 PROCEDURE — 84443 ASSAY THYROID STIM HORMONE: CPT

## 2018-07-23 PROCEDURE — G8988 SELF CARE GOAL STATUS: HCPCS

## 2018-07-23 PROCEDURE — 82140 ASSAY OF AMMONIA: CPT

## 2018-07-23 PROCEDURE — 97162 PT EVAL MOD COMPLEX 30 MIN: CPT

## 2018-07-23 PROCEDURE — 36415 COLL VENOUS BLD VENIPUNCTURE: CPT

## 2018-07-23 PROCEDURE — G8979 MOBILITY GOAL STATUS: HCPCS

## 2018-07-23 PROCEDURE — 6360000002 HC RX W HCPCS: Performed by: EMERGENCY MEDICINE

## 2018-07-23 PROCEDURE — 2580000003 HC RX 258: Performed by: STUDENT IN AN ORGANIZED HEALTH CARE EDUCATION/TRAINING PROGRAM

## 2018-07-23 PROCEDURE — 2060000000 HC ICU INTERMEDIATE R&B

## 2018-07-23 PROCEDURE — A9579 GAD-BASE MR CONTRAST NOS,1ML: HCPCS | Performed by: STUDENT IN AN ORGANIZED HEALTH CARE EDUCATION/TRAINING PROGRAM

## 2018-07-23 RX ORDER — OXYCODONE HYDROCHLORIDE 5 MG/1
5 TABLET ORAL EVERY 6 HOURS PRN
Status: DISCONTINUED | OUTPATIENT
Start: 2018-07-23 | End: 2018-07-24 | Stop reason: HOSPADM

## 2018-07-23 RX ORDER — M-VIT,TX,IRON,MINS/CALC/FOLIC 27MG-0.4MG
1 TABLET ORAL DAILY
Status: DISCONTINUED | OUTPATIENT
Start: 2018-07-23 | End: 2018-07-24 | Stop reason: HOSPADM

## 2018-07-23 RX ORDER — MORPHINE SULFATE 2 MG/ML
1 INJECTION, SOLUTION INTRAMUSCULAR; INTRAVENOUS EVERY 4 HOURS PRN
Status: DISCONTINUED | OUTPATIENT
Start: 2018-07-23 | End: 2018-07-24 | Stop reason: HOSPADM

## 2018-07-23 RX ORDER — ONDANSETRON 2 MG/ML
4 INJECTION INTRAMUSCULAR; INTRAVENOUS EVERY 6 HOURS PRN
Status: DISCONTINUED | OUTPATIENT
Start: 2018-07-23 | End: 2018-07-24 | Stop reason: HOSPADM

## 2018-07-23 RX ORDER — MORPHINE SULFATE 4 MG/ML
4 INJECTION, SOLUTION INTRAMUSCULAR; INTRAVENOUS ONCE
Status: COMPLETED | OUTPATIENT
Start: 2018-07-23 | End: 2018-07-23

## 2018-07-23 RX ORDER — FAMOTIDINE 20 MG/1
20 TABLET, FILM COATED ORAL DAILY
Status: DISCONTINUED | OUTPATIENT
Start: 2018-07-23 | End: 2018-07-24 | Stop reason: HOSPADM

## 2018-07-23 RX ORDER — POTASSIUM CHLORIDE 7.45 MG/ML
10 INJECTION INTRAVENOUS PRN
Status: DISCONTINUED | OUTPATIENT
Start: 2018-07-23 | End: 2018-07-24 | Stop reason: HOSPADM

## 2018-07-23 RX ORDER — CLONIDINE HYDROCHLORIDE 0.2 MG/1
0.2 TABLET ORAL 2 TIMES DAILY
Status: DISCONTINUED | OUTPATIENT
Start: 2018-07-23 | End: 2018-07-24 | Stop reason: HOSPADM

## 2018-07-23 RX ORDER — MAGNESIUM SULFATE 1 G/100ML
1 INJECTION INTRAVENOUS PRN
Status: DISCONTINUED | OUTPATIENT
Start: 2018-07-23 | End: 2018-07-24 | Stop reason: HOSPADM

## 2018-07-23 RX ORDER — ACETAMINOPHEN 325 MG/1
650 TABLET ORAL EVERY 4 HOURS PRN
Status: DISCONTINUED | OUTPATIENT
Start: 2018-07-23 | End: 2018-07-24 | Stop reason: HOSPADM

## 2018-07-23 RX ORDER — MORPHINE SULFATE 2 MG/ML
0.5 INJECTION, SOLUTION INTRAMUSCULAR; INTRAVENOUS EVERY 4 HOURS PRN
Status: DISCONTINUED | OUTPATIENT
Start: 2018-07-23 | End: 2018-07-23

## 2018-07-23 RX ORDER — HEPARIN SODIUM 5000 [USP'U]/ML
5000 INJECTION, SOLUTION INTRAVENOUS; SUBCUTANEOUS EVERY 8 HOURS SCHEDULED
Status: CANCELLED | OUTPATIENT
Start: 2018-07-23

## 2018-07-23 RX ORDER — POTASSIUM CHLORIDE 20MEQ/15ML
40 LIQUID (ML) ORAL PRN
Status: DISCONTINUED | OUTPATIENT
Start: 2018-07-23 | End: 2018-07-24 | Stop reason: HOSPADM

## 2018-07-23 RX ORDER — OXYCODONE HYDROCHLORIDE 5 MG/1
10 TABLET ORAL EVERY 4 HOURS PRN
Status: DISCONTINUED | OUTPATIENT
Start: 2018-07-23 | End: 2018-07-23

## 2018-07-23 RX ORDER — MORPHINE SULFATE 2 MG/ML
1 INJECTION, SOLUTION INTRAMUSCULAR; INTRAVENOUS EVERY 4 HOURS PRN
Status: DISCONTINUED | OUTPATIENT
Start: 2018-07-23 | End: 2018-07-23

## 2018-07-23 RX ORDER — SODIUM CHLORIDE 0.9 % (FLUSH) 0.9 %
10 SYRINGE (ML) INJECTION EVERY 12 HOURS SCHEDULED
Status: DISCONTINUED | OUTPATIENT
Start: 2018-07-23 | End: 2018-07-24 | Stop reason: HOSPADM

## 2018-07-23 RX ORDER — SODIUM CHLORIDE 0.9 % (FLUSH) 0.9 %
30 SYRINGE (ML) INJECTION PRN
Status: DISCONTINUED | OUTPATIENT
Start: 2018-07-23 | End: 2018-07-24 | Stop reason: HOSPADM

## 2018-07-23 RX ORDER — MORPHINE SULFATE 2 MG/ML
2 INJECTION, SOLUTION INTRAMUSCULAR; INTRAVENOUS EVERY 4 HOURS PRN
Status: DISCONTINUED | OUTPATIENT
Start: 2018-07-23 | End: 2018-07-24 | Stop reason: HOSPADM

## 2018-07-23 RX ORDER — SODIUM CHLORIDE 0.9 % (FLUSH) 0.9 %
10 SYRINGE (ML) INJECTION PRN
Status: DISCONTINUED | OUTPATIENT
Start: 2018-07-23 | End: 2018-07-24 | Stop reason: HOSPADM

## 2018-07-23 RX ORDER — POTASSIUM CHLORIDE 20 MEQ/1
40 TABLET, EXTENDED RELEASE ORAL PRN
Status: DISCONTINUED | OUTPATIENT
Start: 2018-07-23 | End: 2018-07-24 | Stop reason: HOSPADM

## 2018-07-23 RX ORDER — PAROXETINE HYDROCHLORIDE 20 MG/1
20 TABLET, FILM COATED ORAL DAILY
Status: DISCONTINUED | OUTPATIENT
Start: 2018-07-23 | End: 2018-07-24 | Stop reason: HOSPADM

## 2018-07-23 RX ORDER — KETOROLAC TROMETHAMINE 30 MG/ML
30 INJECTION, SOLUTION INTRAMUSCULAR; INTRAVENOUS EVERY 8 HOURS PRN
Status: DISCONTINUED | OUTPATIENT
Start: 2018-07-23 | End: 2018-07-23

## 2018-07-23 RX ORDER — OXYCODONE HYDROCHLORIDE 5 MG/1
5 TABLET ORAL EVERY 4 HOURS PRN
Status: DISCONTINUED | OUTPATIENT
Start: 2018-07-23 | End: 2018-07-23

## 2018-07-23 RX ORDER — SODIUM CHLORIDE 9 MG/ML
INJECTION, SOLUTION INTRAVENOUS CONTINUOUS
Status: DISCONTINUED | OUTPATIENT
Start: 2018-07-23 | End: 2018-07-24 | Stop reason: HOSPADM

## 2018-07-23 RX ORDER — OXYCODONE HYDROCHLORIDE 5 MG/1
2.5 TABLET ORAL EVERY 6 HOURS PRN
Status: DISCONTINUED | OUTPATIENT
Start: 2018-07-23 | End: 2018-07-24 | Stop reason: HOSPADM

## 2018-07-23 RX ORDER — SIMVASTATIN 10 MG
10 TABLET ORAL NIGHTLY
Status: DISCONTINUED | OUTPATIENT
Start: 2018-07-23 | End: 2018-07-24 | Stop reason: HOSPADM

## 2018-07-23 RX ADMIN — Medication 1 TABLET: at 20:46

## 2018-07-23 RX ADMIN — Medication 10 ML: at 08:40

## 2018-07-23 RX ADMIN — OXYCODONE HYDROCHLORIDE 5 MG: 5 TABLET ORAL at 20:47

## 2018-07-23 RX ADMIN — SIMVASTATIN 10 MG: 10 TABLET, FILM COATED ORAL at 20:46

## 2018-07-23 RX ADMIN — OXYCODONE HYDROCHLORIDE 5 MG: 5 TABLET ORAL at 09:37

## 2018-07-23 RX ADMIN — MORPHINE SULFATE 2 MG: 2 INJECTION, SOLUTION INTRAMUSCULAR; INTRAVENOUS at 23:11

## 2018-07-23 RX ADMIN — MORPHINE SULFATE 4 MG: 4 INJECTION INTRAVENOUS at 02:17

## 2018-07-23 RX ADMIN — MORPHINE SULFATE 1 MG: 2 INJECTION, SOLUTION INTRAMUSCULAR; INTRAVENOUS at 11:44

## 2018-07-23 RX ADMIN — GADOTERIDOL 10 ML: 279.3 INJECTION, SOLUTION INTRAVENOUS at 04:32

## 2018-07-23 RX ADMIN — Medication 1 TABLET: at 08:39

## 2018-07-23 RX ADMIN — FAMOTIDINE 20 MG: 20 TABLET, FILM COATED ORAL at 08:39

## 2018-07-23 RX ADMIN — MORPHINE SULFATE 1 MG: 2 INJECTION, SOLUTION INTRAMUSCULAR; INTRAVENOUS at 16:11

## 2018-07-23 RX ADMIN — CLONIDINE HYDROCHLORIDE 0.2 MG: 0.2 TABLET ORAL at 08:39

## 2018-07-23 RX ADMIN — MORPHINE SULFATE 1 MG: 2 INJECTION, SOLUTION INTRAMUSCULAR; INTRAVENOUS at 06:09

## 2018-07-23 RX ADMIN — MULTIPLE VITAMINS W/ MINERALS TAB 1 TABLET: TAB at 08:39

## 2018-07-23 RX ADMIN — PAROXETINE HYDROCHLORIDE HEMIHYDRATE 20 MG: 20 TABLET, FILM COATED ORAL at 08:39

## 2018-07-23 RX ADMIN — SODIUM CHLORIDE: 9 INJECTION, SOLUTION INTRAVENOUS at 05:16

## 2018-07-23 RX ADMIN — CLONIDINE HYDROCHLORIDE 0.2 MG: 0.2 TABLET ORAL at 20:46

## 2018-07-23 ASSESSMENT — PAIN SCALES - GENERAL
PAINLEVEL_OUTOF10: 7
PAINLEVEL_OUTOF10: 0
PAINLEVEL_OUTOF10: 8
PAINLEVEL_OUTOF10: 7
PAINLEVEL_OUTOF10: 0
PAINLEVEL_OUTOF10: 8
PAINLEVEL_OUTOF10: 0
PAINLEVEL_OUTOF10: 8
PAINLEVEL_OUTOF10: 7
PAINLEVEL_OUTOF10: 8
PAINLEVEL_OUTOF10: 8
PAINLEVEL_OUTOF10: 5
PAINLEVEL_OUTOF10: 0
PAINLEVEL_OUTOF10: 7

## 2018-07-23 ASSESSMENT — PAIN DESCRIPTION - FREQUENCY
FREQUENCY: CONTINUOUS
FREQUENCY: CONTINUOUS
FREQUENCY: INTERMITTENT

## 2018-07-23 ASSESSMENT — ENCOUNTER SYMPTOMS
ABDOMINAL PAIN: 0
SORE THROAT: 0
PHOTOPHOBIA: 0
NAUSEA: 0
VOMITING: 0
COLOR CHANGE: 0
SHORTNESS OF BREATH: 0
TROUBLE SWALLOWING: 0

## 2018-07-23 ASSESSMENT — PAIN DESCRIPTION - LOCATION
LOCATION: NECK

## 2018-07-23 ASSESSMENT — PAIN DESCRIPTION - DESCRIPTORS
DESCRIPTORS: ACHING
DESCRIPTORS: ACHING;SORE;DISCOMFORT

## 2018-07-23 ASSESSMENT — PAIN DESCRIPTION - ORIENTATION
ORIENTATION: RIGHT
ORIENTATION: POSTERIOR
ORIENTATION: POSTERIOR

## 2018-07-23 ASSESSMENT — PAIN DESCRIPTION - ONSET: ONSET: ON-GOING

## 2018-07-23 ASSESSMENT — PAIN DESCRIPTION - PROGRESSION: CLINICAL_PROGRESSION: NOT CHANGED

## 2018-07-23 ASSESSMENT — PAIN DESCRIPTION - PAIN TYPE
TYPE: ACUTE PAIN

## 2018-07-23 NOTE — PLAN OF CARE
Problem: Falls - Risk of:  Goal: Will remain free from falls  Will remain free from falls   Outcome: Met This Shift  Patient remained free from injury. Patient verbalized understanding of need for the safety precautions. Demonstrates proper use of assistive devices. Bed remains in the lowest position. Call light remains within reach. [facility] [place] [facility] in use.

## 2018-07-23 NOTE — CONSULTS
HISTORY  02-    YAG laser capsulotomy - right eye. (Dr. Shyam Victoria).  OTHER SURGICAL HISTORY  08-    (Dr. Mir Oconnor) - Laparoscopic colostomy takedown with lysis of adhesions and colorectal anastomosis using the EEA-25 staple. (Also, preoperative bilateral lighted stent insertion per Dr. Abdelrahman Mathur).  SMALL INTESTINE SURGERY  08-    (Dr. Mir Oconnor).  UMBILICAL HERNIA REPAIR  16-    YAG CAPSULOTOMY         Social History:   Social History     Social History    Marital status:      Spouse name: N/A    Number of children: N/A    Years of education: N/A     Occupational History    Not on file. Social History Main Topics    Smoking status: Never Smoker    Smokeless tobacco: Never Used    Alcohol use No    Drug use: No    Sexual activity: Not on file     Other Topics Concern    Not on file     Social History Narrative    No narrative on file       Family History:   Family History   Problem Relation Age of Onset    Cancer Mother         (unknown type)    Cancer Sister         (mediastinal cancer - unknown type).  Cancer Brother         (Stomach).  Cervical Cancer Neg Hx     Glaucoma Neg Hx        Allergies:  Bactrim [sulfamethoxazole-trimethoprim] and Flagyl [metronidazole]    Home Medications:  Prior to Admission medications    Medication Sig Start Date End Date Taking?  Authorizing Provider   alendronate (FOSAMAX) 70 MG tablet TAKE 1 TABLET BY MOUTH EVERY 7 DAYS AS DIRECTED 6/22/18   Danielle Castillo MD   PARoxetine (PAXIL) 20 MG tablet TAKE 1 TABLET BY MOUTH DAILY 6/22/18   Danielle Castillo MD   acetaminophen (TYLENOL) 325 MG tablet Take 2 tablets by mouth every 4 hours as needed for Pain or Fever 4/10/18   [facility]   docusate (COLACE, DULCOLAX) 100 MG CAPS Take 100 mg by mouth 2 times daily 4/10/18   [facility]   cloNIDine (CATAPRES) 0.2 MG tablet TAKE 1 TABLET BY MOUTH TWICE DAILY 4/6/18   Iram Laboy MD   simvastatin (ZOCOR) 10 MG tablet TAKE 1 TABLET BY MOUTH EVERY EVENING 2/20/18   Hallie Myles MD   fluticasone Yuli Lunger) 50 MCG/ACT nasal spray 1 spray each nostril daily. 12/23/16   CALEB Kay - CNP   calcium carbonate-vitamin D (CALTRATE 600+D) 600-400 MG-UNIT TABS per tab Take 1 tablet by mouth 2 times daily. Historical Provider, MD   Biotin 5000 MCG TABS Take 5,000 mcg by mouth daily. Historical Provider, MD   Multiple Vitamins-Minerals (MULTIVITAMIN PO) Take 1 tablet by mouth daily. Historical Provider, MD   aspirin 81 MG tablet Take 81 mg by mouth daily. Historical Provider, MD       Current Medications:   No current facility-administered medications for this encounter. REVIEW OF SYSTEMS:       CONSTITUTIONAL: negative for fatigue and malaise   EYES: negative for double vision and photophobia    HEENT: negative for tinnitus and sore throat   RESPIRATORY: negative for cough, shortness of breath   CARDIOVASCULAR: negative for chest pain, palpitations   GASTROINTESTINAL: negative for nausea, vomiting   GENITOURINARY: negative for incontinence   MUSCULOSKELETAL: Admits to neck pain, denies back pain   NEUROLOGICAL: negative for seizures   PSYCHIATRIC: negative for agitated     Review of systems otherwise negative. PHYSICAL EXAM:       BP (!) 156/75   Pulse 71   Temp 98.4 °F (36.9 °C) (Oral)   Resp 14   Wt 115 lb (52.2 kg)   SpO2 98%   BMI 18.56 kg/m²     CONSTITUTIONAL:  Well developed, well nourished, alert and oriented x 3, in no acute distress. GCS 15, nontoxic. No dysarthria, no aphasia. EOMI.     HEAD:  normocephalic, atraumatic    EYES:  PERRLA, EOMI.   ENT:  moist mucous membranes   NECK:  symmetric, cervical collar in place   BACK:  without midline tenderness, step-offs or deformities    LUNGS:  Equal air entry bilaterally   CARDIOVASCULAR:  normal s1 / s2   ABDOMEN:  Soft, no rigidity   NEUROLOGIC:  EYE OPENING     Spontaneous - 4 [x]       To voice - 3 []       To pain - 2 []       None - 1 [] fracture, subluxation, compression deformity or suspicious osseous lesions are identified. AP alignment of the cervical spine is maintained. DEGENERATIVE CHANGES:  There is advanced multilevel degenerative disc disease most evident at C5-C6 and C7-T1. There also advanced hypertrophic degenerative changes of the facet and uncovertebral joints throughout the cervical spine. .  These findings contribute to moderate to severe left neural foraminal narrowing at C4-C5 and mild to moderate right neural foraminal narrowing at C5-C6 SOFT TISSUES:  No prevertebral soft tissue thickening. The incidentally imaged paravertebral soft tissues have a normal noncontrast CT appearance. No abnormal lymphadenopathy appreciated. Thyroid has a normal noncontrast CT appearance. Visualized lung parenchyma is well aerated. 1. No CT evidence for acute intracranial process. 2. Fracture through the base of the C2 vertebral body extending to the left transverse process with slight subluxation of the dominant fracture fragment of the C2 vertebral body, anteriorly. There are blood products within the spinal canal at this level. Cervical spine MRI is suggested for further evaluation. 3.  Critical results were called by Dr. Jose Pearce to Dr. Kaci Ramsey On 7/22/2018 at 19:50. Ct Cervical Spine Wo Contrast    Result Date: 7/22/2018  EXAMINATION: CT OF THE HEAD WITHOUT CONTRAST; CT OF THE CERVICAL SPINE WITHOUT CONTRAST 7/22/2018 7:16 pm TECHNIQUE: CT of the head was performed without the administration of intravenous contrast. Dose modulation, iterative reconstruction, and/or weight based adjustment of the mA/kV was utilized to reduce the radiation dose to as low as reasonably achievable.; CT of the cervical spine was performed without the administration of intravenous contrast. Multiplanar reformatted images are provided for review.  Dose modulation, iterative reconstruction, and/or weight based adjustment of the mA/kV was appearance. No abnormal lymphadenopathy appreciated. Thyroid has a normal noncontrast CT appearance. Visualized lung parenchyma is well aerated. 1. No CT evidence for acute intracranial process. 2. Fracture through the base of the C2 vertebral body extending to the left transverse process with slight subluxation of the dominant fracture fragment of the C2 vertebral body, anteriorly. There are blood products within the spinal canal at this level. Cervical spine MRI is suggested for further evaluation. 3.  Critical results were called by Dr. Ross Morel to Dr. Wood Morocho On 7/22/2018 at 19:50.       ASSESSMENT AND PLAN:       Patient Active Problem List   Diagnosis    Hypertension    Hyperlipidemia    Renal insufficiency    Anxiety disorder    Osteoporosis    Pubic ramus fracture, right, closed, initial encounter [facility])         A/P:  This is a 80 y.o. female with C2 fracture and loss of consciousness which occurred after fall from seated position  Patient care will be discussed with attending, will reevaluate patient along with attending     - No neurosurgical interventions planned for now   - MRI cervical spine WO contrast  - CTLS recommendations: Cervical spine precautions  - HOB: as tolerated   - Neuro checks per protocol  - Hold all antiplatelets and anticoagulants  -Further management per primary    Additional recommendations may follow    Please contact neurosurgery with any changes in patients neurologic status. Thank you for your consult.        Rashard Hernandez MD   NS pager [phone]  7/23/2018  12:40 AM

## 2018-07-23 NOTE — ED PROVIDER NOTES
History Main Topics    Smoking status: Never Smoker    Smokeless tobacco: Never Used    Alcohol use No    Drug use: No    Sexual activity: Not on file     Other Topics Concern    Not on file     Social History Narrative    No narrative on file       Family History   Problem Relation Age of Onset    Cancer Mother         (unknown type)    Cancer Sister         (mediastinal cancer - unknown type).  Cancer Brother         (Stomach).  Cervical Cancer Neg Hx     Glaucoma Neg Hx        Allergies:  Bactrim [sulfamethoxazole-trimethoprim] and Flagyl [metronidazole]    Home Medications:  Prior to Admission medications    Medication Sig Start Date End Date Taking? Authorizing Provider   alendronate (FOSAMAX) 70 MG tablet TAKE 1 TABLET BY MOUTH EVERY 7 DAYS AS DIRECTED 6/22/18   Noel Veras MD   PARoxetine (PAXIL) 20 MG tablet TAKE 1 TABLET BY MOUTH DAILY 6/22/18   Noel Veras MD   acetaminophen (TYLENOL) 325 MG tablet Take 2 tablets by mouth every 4 hours as needed for Pain or Fever 4/10/18   Kush Ode   docusate (COLACE, DULCOLAX) 100 MG CAPS Take 100 mg by mouth 2 times daily 4/10/18   Kush Shrestha   cloNIDine (CATAPRES) 0.2 MG tablet TAKE 1 TABLET BY MOUTH TWICE DAILY 4/6/18   Nathen Delatorre MD   simvastatin (ZOCOR) 10 MG tablet TAKE 1 TABLET BY MOUTH EVERY EVENING 2/20/18   Noel Veras MD   fluticasone (FLONASE) 50 MCG/ACT nasal spray 1 spray each nostril daily. 12/23/16   CALEB Kay - CNP   calcium carbonate-vitamin D (CALTRATE 600+D) 600-400 MG-UNIT TABS per tab Take 1 tablet by mouth 2 times daily. Historical Provider, MD   Biotin 5000 MCG TABS Take 5,000 mcg by mouth daily. Historical Provider, MD   Multiple Vitamins-Minerals (MULTIVITAMIN PO) Take 1 tablet by mouth daily. Historical Provider, MD   aspirin 81 MG tablet Take 81 mg by mouth daily.     Historical Provider, MD       REVIEW OF SYSTEMS    (2-9 systems for level 4, 10 or more for level 5) Review of Systems   Constitutional: Negative for chills, fatigue and fever. HENT: Positive for ear pain. Negative for sore throat and trouble swallowing. Eyes: Negative for photophobia and visual disturbance. Respiratory: Negative for shortness of breath. Cardiovascular: Negative for chest pain. Gastrointestinal: Negative for abdominal pain, nausea and vomiting. Musculoskeletal: Positive for neck pain and neck stiffness. Negative for joint swelling. Skin: Negative for color change, rash and wound. Neurological: Negative for dizziness, weakness and headaches. PHYSICAL EXAM   (up to 7 for level 4, 8 or more for level 5)      INITIAL VITALS:   BP (!) 156/75   Pulse 71   Temp 98.4 °F (36.9 °C) (Oral)   Resp 14   Wt 115 lb (52.2 kg)   SpO2 98%   BMI 18.56 kg/m²     Physical Exam   Constitutional: She is oriented to person, place, and time. She appears well-developed and well-nourished. No distress. HENT:   Head: Normocephalic and atraumatic. Right Ear: External ear normal.   Left Ear: External ear normal.   Eyes: Conjunctivae and EOM are normal. Pupils are equal, round, and reactive to light. Right eye exhibits no discharge. Left eye exhibits no discharge. Neck: No JVD present. C-collar in place. No stridor, no JVD   Cardiovascular: Normal rate, regular rhythm, normal heart sounds and intact distal pulses. Exam reveals no gallop and no friction rub. No murmur heard. Pulmonary/Chest: Effort normal and breath sounds normal. No stridor. No respiratory distress. She has no wheezes. Abdominal: Soft. Bowel sounds are normal. She exhibits no distension. There is no tenderness. Musculoskeletal: Normal range of motion. She exhibits no edema, tenderness or deformity. Neurological: She is alert and oriented to person, place, and time. She has normal reflexes. She displays normal reflexes. No cranial nerve deficit. She exhibits normal muscle tone.  Coordination normal.    previous outlying hospital.  Patient neurovascularly intact on arrival.  No new or worsening symptoms since transfer. Pain well controlled, given Norco prior to arrival.  Plan to consult trauma surgery, neurosurgery for further management and evaluation. Patient seen and evaluated by neurosurgery, trauma surgery. Remains stable at this time. Reporting mild increasing pain, morphine 4 mg given for symptomatic relief. Per trauma surgery, patient will be admitted to internal medicine with trauma surgery and neurosurgery following. Spoke with internal medicine who agreed to accept the patient. Laboratory workup ordered per request by internal medicine. Patient remained stable throughout the entirety of ED visit on her my care and was transported to floor in no acute distress. PROCEDURES:  None    CONSULTS:  IP CONSULT TO NEUROSURGERY  IP CONSULT TO TRAUMA SURGERY  IP CONSULT TO INTERNAL MEDICINE  IP CONSULT TO SOCIAL WORK    CRITICAL CARE:  None    FINAL IMPRESSION      1.  Closed displaced fracture of second cervical vertebra, unspecified fracture morphology, initial encounter [facility])          DISPOSITION / PLAN     DISPOSITION      Admitted    PATIENT REFERRED TO:  Daryl Landeros MD  [facility] [phone]    Schedule an appointment as soon as possible for a visit in 1 week  S1 heart murmur (known)      DISCHARGE MEDICATIONS:  New Prescriptions    No medications on file       Dorna Gosselin, DO    Emergency Medicine Resident    (Please note that portions of this note were completed with a voice recognition program.  Efforts were made to edit the dictations but occasionally words are mis-transcribed.)     Dorna Gosselin, MD  07/23/18 5099

## 2018-07-23 NOTE — PROGRESS NOTES
Trauma Tertiary Survey    Admit Date: 7/23/2018  Hospital day 1    Fall 31 Elza Juárez       Past Medical History:   Diagnosis Date    Anxiety neurosis     Cataract     (Left eye).  History of peritonitis 2010    Hyperlipidemia     Hypertension     Macular edema     Pelvis fracture (HCC)     Pseudophakos     (Right eye).  Renal insufficiency     (Mild)       Scheduled Meds:   sodium chloride flush  10 mL Intravenous 2 times per day    cloNIDine  0.2 mg Oral BID    therapeutic multivitamin-minerals  1 tablet Oral Daily    PARoxetine  20 mg Oral Daily    simvastatin  10 mg Oral Nightly    calcium carbonate-vitamin D  1 tablet Oral BID    sodium chloride flush  10 mL Intravenous 2 times per day    famotidine  20 mg Oral Daily     Continuous Infusions:   sodium chloride 75 mL/hr at 07/23/18 0516     PRN Meds:sodium chloride flush, acetaminophen, sodium chloride flush, magnesium hydroxide, ondansetron, potassium chloride **OR** potassium chloride **OR** potassium chloride, magnesium sulfate, sodium chloride flush, morphine **OR** morphine    Subjective:     Patient has complaints of pain in the back of her head. Pain is mild, worsens with movement, and some relief by rest.  There is not associated numbness, tingling, weakness. Objective:   Patient Vitals for the past 8 hrs:   BP Temp Temp src Pulse Resp SpO2 Height Weight   07/23/18 0448 (!) 157/77 98.1 °F (36.7 °C) Oral 79 15 90 % - -   07/23/18 0430 - 98.1 °F (36.7 °C) Oral 77 14 - 5' 6\" (1.676 m) 120 lb (54.4 kg)   07/23/18 0228 (!) 143/74 - - - - - - -   07/23/18 0216 (!) 165/77 - - 74 22 97 % - -   07/23/18 0146 (!) 152/76 - - 69 19 98 % - -   07/23/18 0016 (!) 156/75 98.4 °F (36.9 °C) Oral 71 14 98 % - 115 lb (52.2 kg)       No intake/output data recorded. No intake/output data recorded.     Radiology:    Ct Head Wo Contrast    Result Date: 7/22/2018  EXAMINATION: CT OF THE HEAD WITHOUT CONTRAST; CT OF THE CERVICAL SPINE WITHOUT CONTRAST 7/22/2018 There is advanced multilevel degenerative disc disease most evident at C5-C6 and C7-T1. There also advanced hypertrophic degenerative changes of the facet and uncovertebral joints throughout the cervical spine. .  These findings contribute to moderate to severe left neural foraminal narrowing at C4-C5 and mild to moderate right neural foraminal narrowing at C5-C6 SOFT TISSUES:  No prevertebral soft tissue thickening. The incidentally imaged paravertebral soft tissues have a normal noncontrast CT appearance. No abnormal lymphadenopathy appreciated. Thyroid has a normal noncontrast CT appearance. Visualized lung parenchyma is well aerated. 1. No CT evidence for acute intracranial process. 2. Fracture through the base of the C2 vertebral body extending to the left transverse process with slight subluxation of the dominant fracture fragment of the C2 vertebral body, anteriorly. There are blood products within the spinal canal at this level. Cervical spine MRI is suggested for further evaluation. 3.  Critical results were called by Dr. Lance Sharma to Dr. Lakshmi Blakely On 7/22/2018 at 19:50. Ct Cervical Spine Wo Contrast    Result Date: 7/22/2018  EXAMINATION: CT OF THE HEAD WITHOUT CONTRAST; CT OF THE CERVICAL SPINE WITHOUT CONTRAST 7/22/2018 7:16 pm TECHNIQUE: CT of the head was performed without the administration of intravenous contrast. Dose modulation, iterative reconstruction, and/or weight based adjustment of the mA/kV was utilized to reduce the radiation dose to as low as reasonably achievable.; CT of the cervical spine was performed without the administration of intravenous contrast. Multiplanar reformatted images are provided for review. Dose modulation, iterative reconstruction, and/or weight based adjustment of the mA/kV was utilized to reduce the radiation dose to as low as reasonably achievable.  COMPARISON: Head CT from 10/09/2011 HISTORY: ORDERING SYSTEM PROVIDED HISTORY: fall TECHNOLOGIST PROVIDED HISTORY: Ordering Physician Provided Reason for Exam: R/o bleed/fx after fall Acuity: Acute Type of Exam: Initial FINDINGS: CT HEAD: BRAIN/VENTRICLES: There is no acute intracranial hemorrhage, mass effect or midline shift. No abnormal extra-axial fluid collection. There is stable mineralization of the bilateral basal ganglia. The gray-white differentiation is maintained without evidence of an acute infarct. There is no evidence of hydrocephalus. ORBITS: The visualized portion of the orbits demonstrate no acute abnormality. SINUSES: The visualized paranasal sinuses and mastoid air cells demonstrate no acute abnormality. SOFT TISSUES/SKULL:  No acute abnormality of the visualized skull or soft tissues. CT CERVICAL SPINE: BONE/ALIGNMENT: There is a acute fracture through the base of the C2 vertebral body, extending to through the left transverse process. The dominant fracture fragment is subluxed slightly anteriorly with respect to the base of the C2 vertebral body. There is hyperdensity surrounding the spinal canal at this level compatible with acute blood products. No acute fracture, subluxation, compression deformity or suspicious osseous lesions are identified. AP alignment of the cervical spine is maintained. DEGENERATIVE CHANGES:  There is advanced multilevel degenerative disc disease most evident at C5-C6 and C7-T1. There also advanced hypertrophic degenerative changes of the facet and uncovertebral joints throughout the cervical spine. .  These findings contribute to moderate to severe left neural foraminal narrowing at C4-C5 and mild to moderate right neural foraminal narrowing at C5-C6 SOFT TISSUES:  No prevertebral soft tissue thickening. The incidentally imaged paravertebral soft tissues have a normal noncontrast CT appearance. No abnormal lymphadenopathy appreciated. Thyroid has a normal noncontrast CT appearance. Visualized lung parenchyma is well aerated.      1. No CT evidence for protrusion, spinal canal stenosis or neural foraminal narrowing. Base of C2 fracture as detailed at CT. No evidence of cord injury or canal compromise. Mild posterior ligamentous complex injury left side C1-2 level. Suboccipital muscle strain. Mra Neck W Wo Contrast    Result Date: 7/23/2018  EXAMINATION: MRA OF THE NECK WITH AND WITHOUT CONTRAST 7/23/2018 4:32 am TECHNIQUE: Multiplanar multisequence MRA of the neck was performed with and without the administration of intravenous contrast. Stenosis of the internal carotid arteries measured using NASCET criteria. COMPARISON: None. HISTORY: ORDERING SYSTEM PROVIDED HISTORY: c2 fx FINDINGS: AORTIC ARCH/GREAT VESSELS: There is a normal branch pattern of the aortic arch. No significant stenosis is seen of the innominate artery or subclavian arteries. CAROTID ARTERIES: The common carotid arteries are normal in appearance without evidence of a flow limiting stenosis. The internal carotid arteries are normal in appearance without evidence of a flow limiting stenosis by NASCET criteria. VERTEBRAL ARTERIES: The vertebral arteries both arise from the subclavian arteries and are normal in caliber without evidence of flow limiting stenosis. The right vertebral artery ends in PICA. No evidence cervical arteriovascular injury. Physical Exam:    GCS:  4 - Opens eyes on own   6 - Follows simple motor commands  5 - Alert and oriented       Left Right   Pupil size: 3 mm 3 mm   Pupil reaction Yes Yes   Hand grasp:  normal normal   Wiggles fingers: Yes Yes   Wiggles toes:  Yes Yes   Plantar flexion: normal normal       BP (!) 157/77   Pulse 79   Temp 98.1 °F (36.7 °C) (Oral)   Resp 15   Ht 5' 6\" (1.676 m)   Wt 120 lb (54.4 kg)   SpO2 90%   BMI 19.37 kg/m²   General appearance: alert, appears stated age and cooperative  Head: Normocephalic, without obvious abnormality, atraumatic  Eyes: conjunctivae/corneas clear. PERRL, EOM's intact. Fundi benign.   Throat: lips, mucosa, and tongue normal; teeth and gums normal  Neck: C collar   Back: deferred to NS  Lungs: clear to auscultation bilaterally  Heart: regular rate and rhythm, S1, S2 normal, no murmur, click, rub or gallop  Abdomen: soft, non-tender; bowel sounds normal; no masses,  no organomegaly  Extremities: extremities normal, atraumatic, no cyanosis or edema  Pulses: 2+ and symmetric    Spine:     Deferred to NS     Musculoskeletal    Joint Tenderness Swelling ROM   Right shoulder absent absent normal   Left shoulder absent absent normal   Right elbow absent absent normal   Left elbow absent absent normal   Right wrist absent absent normal   Left wrist absent absent normal   Right hand grasp absent absent normal   Left hand grasp absent absent normal   Right hip absent absent normal   Left hip absent absent normal   Right knee absent absent normal   Left knee absent absent normal   Right ankle absent absent normal   Left ankle absent absent normal   Right foot absent absent normal   Left foot absent absent normal       Consults:  IP CONSULT TO NEUROSURGERY  IP CONSULT TO TRAUMA SURGERY  IP CONSULT TO INTERNAL MEDICINE  IP CONSULT TO SOCIAL WORK    Procedures:  None    Injuries:      Patient Active Problem List   Diagnosis    Hypertension    Hyperlipidemia    Renal insufficiency    Anxiety disorder    Osteoporosis    Pubic ramus fracture, right, closed, initial encounter ([facility] [address]    Traumatic closed fracture of C2 vertebra with minimal displacement, initial encounter [facility])         Assessment/Plan:   81 yo female presenting with closed c2 fx after fall while reading the paper. No new injuries on tertiary exam. Patient without any new complaints. No further imaging indicated at this time. Trauma to sign off.        DISPOSITION:   Continue current management    Papito Marte MD   Emergency Medicine Resident  Trauma/Surgery Service  7/23/2018 at 8:07 AM

## 2018-07-23 NOTE — DISCHARGE SUMMARY
cervical spine precautions, head of bed as tolerated. Hold antiplatelets and anticoagulants. MRI c-spine reviewed and neurosurgery not planning any neurosurgical interventions now. Patient advised tot wear hard cervical collar for 3 months for type 3 odontoid fracture. Follow up in clinic in 2 weeks. Procedures:  none    Any Hospital Acquired Infections: none    Discharge Functional Status:  stable    Disposition: home with home care. Patient Instructions:   Current Discharge Medication List      CONTINUE these medications which have NOT CHANGED    Details   alendronate (FOSAMAX) 70 MG tablet TAKE 1 TABLET BY MOUTH EVERY 7 DAYS AS DIRECTED  Qty: 12 tablet, Refills: 3    Associated Diagnoses: Osteoporosis      PARoxetine (PAXIL) 20 MG tablet TAKE 1 TABLET BY MOUTH DAILY  Qty: 90 tablet, Refills: 3    Associated Diagnoses: Anxiety neurosis      acetaminophen (TYLENOL) 325 MG tablet Take 2 tablets by mouth every 4 hours as needed for Pain or Fever  Qty: 120 tablet, Refills: 0      docusate (COLACE, DULCOLAX) 100 MG CAPS Take 100 mg by mouth 2 times daily  Qty: 30 capsule, Refills: 0      cloNIDine (CATAPRES) 0.2 MG tablet TAKE 1 TABLET BY MOUTH TWICE DAILY  Qty: 180 tablet, Refills: 3      simvastatin (ZOCOR) 10 MG tablet TAKE 1 TABLET BY MOUTH EVERY EVENING  Qty: 90 tablet, Refills: 3    Associated Diagnoses: Hyperlipidemia      fluticasone (FLONASE) 50 MCG/ACT nasal spray 1 spray each nostril daily. Qty: 1 Bottle, Refills: 2    Associated Diagnoses: Perennial allergic rhinitis, unspecified allergic rhinitis trigger      calcium carbonate-vitamin D (CALTRATE 600+D) 600-400 MG-UNIT TABS per tab Take 1 tablet by mouth 2 times daily. Biotin 5000 MCG TABS Take 5,000 mcg by mouth daily. Multiple Vitamins-Minerals (MULTIVITAMIN PO) Take 1 tablet by mouth daily. aspirin 81 MG tablet Take 81 mg by mouth daily. Activity: head of bed as tolerated.     Diet: regular

## 2018-07-23 NOTE — PROGRESS NOTES
Physical Therapy    Facility/Department: [facility] NEURO  Initial Assessment    NAME: Eleonora Nguyen  :   MRN: [id number]    Copied from neurosurgery note filed 18 at 1:45 am:  CHIEF COMPLAINT:      C2 fracture, neck pain  HISTORY OF PRESENT ILLNESS:      The patient is a 80 y.o. female who presents with C2 fracture. Patient states that she was sitting at the dinner table and fell out of her chair, she states that she does not remember these events. She admits to neck pain. She denies any other pain at this time. She denies any weakness, numbness, changes in vision or hearing, bowel or bladder incontinence, or saddle anesthesia. Patient states that she recently fractured her pelvis after falling. She admits to daily 81 mg aspirin use but denies any other antiplatelet or anticoagulant use. She denies any prior history of back or neck surgery. She has no additional complaints at this time. Date of Service: 2018    Discharge Recommendations:  [address]        Patient Diagnosis(es): The primary encounter diagnosis was Closed displaced fracture of second cervical vertebra, unspecified fracture morphology, initial encounter ([facility] [address] A diagnosis of Traumatic closed fracture of C2 vertebra with minimal displacement, initial encounter [facility]) was also pertinent to this visit. has a past medical history of Anxiety neurosis; Cataract; History of peritonitis; Hyperlipidemia; Hypertension; Macular edema; Pelvis fracture ([facility] [address] Pseudophakos; and Renal insufficiency. has a past surgical history that includes other surgical history (2013); Small intestine surgery (2010); other surgical history (2010); Cystoscopy (2010); Colonoscopy (2010); Abdominal exploration surgery (2010); Cholecystectomy, laparoscopic (56-); Umbilical hernia repair (963); Dilation and curettage of uterus (1985);  Dilation and curettage of uterus (96-); Colposcopy (Remote); Cataract removal; and Yag capsulotomy. Restrictions  Restrictions/Precautions  Restrictions/Precautions: General Precautions, Fall Risk  Required Braces or Orthoses?: Yes  Required Braces or Orthoses  Cervical: c-collar  Position Activity Restriction  Other position/activity restrictions: up as brittany  Vision/Hearing  Vision: Impaired  Vision Exceptions: Wears glasses for reading  Hearing: Exceptions to [facility]  Hearing Exceptions: Hard of hearing/hearing concerns     Subjective  General  Patient assessed for rehabilitation services?: Yes  Response To Previous Treatment: Not applicable  Family / Caregiver Present: Yes (spouse + 1)  Follows Commands: Within Functional Limits  Subjective  Subjective: RN and pt agreeable to PT. Pt alert in bed upon arrival.   Pain Screening  Patient Currently in Pain: Yes  Pain Assessment  Pain Assessment: 0-10  Pain Level: 7  Pain Type: Acute pain  Pain Location: Neck  Pain Orientation: Posterior  Pain Intervention(s): Repositioned; Ambulation/Increased activity; Emotional support  Response to Pain Intervention: Patient Satisfied  Vital Signs  Patient Currently in Pain: Yes  Pre Treatment Pain Screening  Intervention List: Patient able to continue with treatment    Orientation  Orientation  Overall Orientation Status: Within Functional Limits    Social/Functional History  Social/Functional History  Lives With: Spouse  Type of Home: House  Home Layout: One level, Laundry in basement  Home Access: Stairs to enter with rails  Entrance Stairs - Number of Steps: 3  Entrance Stairs - Rails: Right  Bathroom Shower/Tub: Tub/Shower unit  Bathroom Toilet: Standard  Bathroom Equipment: Grab bars in shower (However suction-cup kind)  Bathroom Accessibility: Accessible  Home Equipment: Rolling walker, Cane (Doesn't use daily)  Receives Help From: Family (Dtr able to assist with laundry if needed, lives nearby)  ADL Assistance: [address] [place] [address] Avenue: Independent  Homemaking Responsibilities: Yes  Ambulation Assistance: Independent  Transfer Assistance: Independent  Active : Yes (Spouse also drives in town distances)  Occupation: Retired  Leisure & Hobbies: geneology, crafts  Additional Comments: Spouse has some health issues but cares for self, dtr/son able to come assist with c-collar donning/laundry in basement if needed per son  Objective          AROM RLE (degrees)  RLE AROM: WFL  AROM LLE (degrees)  LLE AROM : WFL  AROM RUE (degrees)  RUE AROM : WFL  RUE General AROM: within 90deg d/t cervical precautions  AROM LUE (degrees)  LUE AROM : WFL  LUE General AROM: within 90deg d/t cervical precautions  Strength RLE  Strength RLE: WFL  Strength LLE  Strength LLE: WFL  Strength RUE  Strength RUE: WFL  Strength LUE  Strength LUE: WFL  Strength Other  Other: grossly deconditioned     Sensation  Overall Sensation Status: WFL (Pt denies any numbness/ tingling)  Bed mobility  Supine to Sit: Minimal assistance  Sit to Supine: Contact guard assistance  Scooting: Minimal assistance  Transfers  Sit to Stand: Contact guard assistance  Stand to sit: Contact guard assistance  Ambulation  Ambulation?: Yes  Ambulation 1  Surface: level tile  Device: Rolling Walker  Assistance: Contact guard assistance  Quality of Gait: very slow, decreased step length, cues needed to keep eyes open, pt noted dizziness with cues to take standing rest break with some recovery  Distance: 150'  Stairs/Curb  Stairs?: No     Balance  Posture: Good  Sitting - Static: Good  Sitting - Dynamic: Good;-  Standing - Static: Fair;+  Standing - Dynamic: Fair  Comments: RW standing balance        Assessment   Body structures, Functions, Activity limitations: Decreased functional mobility ; Decreased strength;Decreased balance  Assessment: amb 130' RW CGA, very slow and needed cues to keep eyes open. roque decreased with distance.  SNF  Prognosis: Good  Decision Making: Medium Complexity  Patient

## 2018-07-23 NOTE — H&P
building, barn, ditch, tree):     Type of collision  [] Single Vehicle Collision  []Multiple Vehicle Collision  [] unknown collision type    Mechanism considerations  [] Fatality in Same Vehicle      []Ejected       []Rollover          []Extricated    Internal Compartment   []                      []Passenger:      []Front Seat        []Rear [address]  [] Unrestrained   []Lap Belt Only Restrained   [] Shoulder Belt Only Restrained  [] 3 Point Restrained  [] unknown     Air Bags  [] Front Air Bag  []Side Air Bag  []Other Air Bag []Air Bag Not Deployed      Pediatric Consideration:      [] Booster Seat  []Infant Car Seat  [] Child Car Seat      [] Motorcycle Collision   Wearing Helmet     []Yes     []No    []Unknown    [] Bicycle Collision Wearing Helmet     []Yes     []No    []Unknown    [] Pedestrian Struck         [x] Fall    [x]From Sitting     []From Height  Ft     []Down Stairs      [] Assault    [] Gunshot  Specify caliber / type of gun: ____________________________    [] [address] weapon type, size: _____________________________    [] Burn  []Flame   []Scald   []Electrical   []Chemical  []Inhalation   []House fire    [] Other ______________________________________________________    [] Other protective devices used / worn ___________________________    HISTORY:   Lornea Maurice is a [place] 3 y.o. female that presented as a transfer  From [facility] after falling from her kitchen chair. She states she was reading the paper then remember being on the floor and having neck pain. She does not remember the fall itself and states she \"could have fallen asleep\". Her  came to help her immediately after. She denies having dizziness, lightheadedness, headache, vision changes, CP, SOB, numbness, tingling, weakness before or after the fall. Pt denies use of anticoagulants.      Loss of Consciousness [x]Unknown  []No   []Yes (Duration  )    Total Fluids Given Prior To Arrival  cc    MEDICATIONS:   See Trauma flow sheet    ALLERGIES:   See Trauma flow sheet    PAST MEDICAL HISTORY:  See Trauma flow sheet    FAMILY HISTORY   See Trauma flow sheet    SOCIAL HISTORY   See Trauma flow sheet    PERTINENT SYSTEMIC REVIEW:  See Trauma flow sheet    Constitutional: negative for chills and fevers  Eyes: negative for glaucoma, irritation, redness and visual disturbance  Ears, nose, mouth, throat, and face: negative for facial trauma and hearing loss  Respiratory: negative for cough, pleurisy/chest pain, shortness of breath and wheezing  Cardiovascular: negative for chest pain, chest pressure/discomfort, exertional chest pressure/discomfort, irregular heart beat and syncope  Gastrointestinal: negative for abdominal pain, change in bowel habits, constipation, diarrhea, nausea and vomiting  Genitourinary:negative for dysuria, frequency, hematuria and urinary incontinence  Hematologic/lymphatic: negative for bleeding and easy bruising  Musculoskeletal:negative for back pain, muscle weakness and positive for neck pain  Neurological: negative for dizziness, headaches, seizures, speech problems, vertigo and weakness  Endocrine: negative for diabetic symptoms including neuropathy, nausea, vomiting and diarrhea  Allergic/Immunologic: negative for anaphylaxis    PHYSICAL EXAMINATION:     GLASCOW COMA SCALE  NEUROMUSCULAR BLOCKADE PRIOR TO ARRIVAL     [x]No        []Yes      Variable  Score   Variable  Score  Eye opening [x]Spontaneous  4  Verbal  [x]Oriented  5     []To voice  3   []Confused  4    []To pain  2   []Inapp words  3    []None   1   []Incomp words 2       []None   1   Motor   [x]Obeys  6    []Localizes pain  5    []Withdraws(pain) 4    []Flexion(pain)  3  []Extension(pain) 2    []None   1     GCS Total = 15    PHYSICAL EXAMINATION  VITAL SIGNS: See Trauma flow sheet  General Appearance: alert and oriented to person, place and time, in no acute distress  Skin: warm and dry, no rash or erythema  Head: normocephalic and atraumatic  Eyes: pupils equal, round, and reactive to light, extraocular eye movements intact  ENT: tympanic membrane, external ear and ear canal normal bilaterally, nose without deformity  Neck: TTP on posterior C spine. NO JVD, Cervical Collar in place  Pulmonary/Chest: CTA bilaterally, normal air movement, no respiratory distress   Cardiovascular: normal rate, regular rhythm, S1 murmur heard best at Aortic listening post, normal S2, no M/R/G, distal pulses intact  Abdomen: soft, non-tender, non-distended  Rectal Exam: Good tone with no obvious blood  Musculoskeletal: normal range of motion, no joint swelling, deformity or tenderness  Neurologic: reflexes normal and symmetric, no cranial nerve deficit, and speech normal        RADIOLOGY  Ct Head Wo Contrast    Result Date: 7/22/2018  EXAMINATION: CT OF THE HEAD WITHOUT CONTRAST; CT OF THE CERVICAL SPINE WITHOUT CONTRAST 7/22/2018 7:16 pm TECHNIQUE: CT of the head was performed without the administration of intravenous contrast. Dose modulation, iterative reconstruction, and/or weight based adjustment of the mA/kV was utilized to reduce the radiation dose to as low as reasonably achievable.; CT of the cervical spine was performed without the administration of intravenous contrast. Multiplanar reformatted images are provided for review. Dose modulation, iterative reconstruction, and/or weight based adjustment of the mA/kV was utilized to reduce the radiation dose to as low as reasonably achievable. COMPARISON: Head CT from 10/09/2011 HISTORY: ORDERING SYSTEM PROVIDED HISTORY: fall TECHNOLOGIST PROVIDED HISTORY: Ordering Physician Provided Reason for Exam: R/o bleed/fx after fall Acuity: Acute Type of Exam: Initial FINDINGS: CT HEAD: BRAIN/VENTRICLES: There is no acute intracranial hemorrhage, mass effect or midline shift. No abnormal extra-axial fluid collection. There is stable mineralization of the bilateral basal ganglia.   The surrounding the spinal canal at this level compatible with acute blood products. No acute fracture, subluxation, compression deformity or suspicious osseous lesions are identified. AP alignment of the cervical spine is maintained. DEGENERATIVE CHANGES:  There is advanced multilevel degenerative disc disease most evident at C5-C6 and C7-T1. There also advanced hypertrophic degenerative changes of the facet and uncovertebral joints throughout the cervical spine. .  These findings contribute to moderate to severe left neural foraminal narrowing at C4-C5 and mild to moderate right neural foraminal narrowing at C5-C6 SOFT TISSUES:  No prevertebral soft tissue thickening. The incidentally imaged paravertebral soft tissues have a normal noncontrast CT appearance. No abnormal lymphadenopathy appreciated. Thyroid has a normal noncontrast CT appearance. Visualized lung parenchyma is well aerated. 1. No CT evidence for acute intracranial process. 2. Fracture through the base of the C2 vertebral body extending to the left transverse process with slight subluxation of the dominant fracture fragment of the C2 vertebral body, anteriorly. There are blood products within the spinal canal at this level. Cervical spine MRI is suggested for further evaluation. 3.  Critical results were called by Dr. Talia Rae to Dr. Ricardo Avery On 7/22/2018 at 19:50. LABS  Labs Reviewed   BASIC METABOLIC PANEL   HEPATIC FUNCTION PANEL   CBC WITH AUTO DIFFERENTIAL   TSH WITHOUT REFLEX   T4, FREE       [place] More[place], DO  PGY 1  Resident Physician Emergency Medicine  Trauma and General Surgery Service  07/23/18 2:20 AM    Trauma, Emergency and Critical Surgical Services  Attending Note      I have reviewed the above TEC note(s) and confirmed the key elements of the medical history and physical exam. I have discussed the findings, established the care plan and recommendations with Resident, XOCHITL RN, bedside nurse.   Patient seen

## 2018-07-23 NOTE — ED NOTES
Pt updated on POC, medicated for pain, denies further needs.      Piedad Mead, [facility]  07/23/18 [id number]

## 2018-07-23 NOTE — H&P
OTHER SURGICAL HISTORY  02-    YAG laser capsulotomy - right eye. (Dr. Ruchi Sebastian).  OTHER SURGICAL HISTORY  08-    (Dr. Isadora Chairez) - Laparoscopic colostomy takedown with lysis of adhesions and colorectal anastomosis using the EEA-25 staple. (Also, preoperative bilateral lighted stent insertion per Dr. Freddy Esteban).  SMALL INTESTINE SURGERY  08-    (Dr. Isadora Chairez).  UMBILICAL HERNIA REPAIR  98-    YAG CAPSULOTOMY         MEDICATION PRIOR TO ADMISSION:  Not in a hospital admission. Allergies:  Bactrim [sulfamethoxazole-trimethoprim] and Flagyl [metronidazole]    SOCIAL HISTORY:   TOBACCO:  Never used tobacco  ETOH:  Never drank alcohol  DRUGS:  Never used recreational drugs  ACTIVITIES OF DAILY LIVING:  Patient is able to perform all activities of daily living. MARITAL STATUS:      FAMILY HISTORY:   Family History   Problem Relation Age of Onset    Cancer Mother         (unknown type)    Cancer Sister         (mediastinal cancer - unknown type).  Cancer Brother         (Stomach).     Cervical Cancer Neg Hx     Glaucoma Neg Hx        REVIEW OF SYSTEMS:  Constitutional: negative for chills and fevers  Eyes: negative for glaucoma, irritation, redness and visual disturbance  Ears, nose, mouth, throat, and face: negative for facial trauma and hearing loss  Respiratory: negative for cough, pleurisy/chest pain, shortness of breath and wheezing  Cardiovascular: negative for chest pain, chest pressure/discomfort, exertional chest pressure/discomfort, irregular heart beat and syncope  Gastrointestinal: negative for abdominal pain, change in bowel habits, constipation, diarrhea, nausea and vomiting  Genitourinary:negative for dysuria, frequency, hematuria and urinary incontinence  Hematologic/lymphatic: negative for bleeding and easy bruising  Musculoskeletal:negative for back pain, muscle weakness and positive for neck pain  Neurological: negative for dizziness, headaches, seizures,

## 2018-07-23 NOTE — PROGRESS NOTES
Smoking Cessation - topics covered   []  Health Risks  []  Benefits of Quitting   []  Smoking Cessation  [x]  Patient has no history of tobacco use  []  Patient is former smoker. [x]  No need for tobacco cessation education. []  Booklet given  []  Patient verbalizes understanding. []  Patient denies need for tobacco cessation education.   Beny Nunez  8:57 AM

## 2018-07-23 NOTE — PROGRESS NOTES
at all times in home upon discharge  ADL  Feeding: Modified independent ;Setup  Grooming: Supervision;Setup; Increased time to complete (Standing/sitting at sinkside during oral care)  UE Bathing: Stand by assistance  LE Bathing: Stand by assistance  UE Dressing: Minimal assistance  LE Dressing: Stand by assistance; Increased time to complete  Toileting: Stand by assistance  Tone RUE  RUE Tone: Normotonic  Tone LUE  LUE Tone: Normotonic  Coordination  Movements Are Fluid And Coordinated: No  Coordination and Movement description: Gross motor impairments;Right UE;Left UE;Decreased speed     Bed mobility  Supine to Sit: Contact guard assistance  Sit to Supine: Contact guard assistance  Scooting: Stand by assistance  Comment: SLow transfer d/t neck pain, pt usd RUE to \"hold head\" in c-collar during transfers, pt encouraged to use abdominal strength during transfers to assist  Transfers  Sit to stand: Supervision  Stand to sit: Supervision     Cognition  Overall Cognitive Status: WFL     LUE AROM (degrees)  LUE AROM : WFL  LUE General AROM: Pt educated to not shoulder flex >90 degrees d/t cervical precautions-good return  RUE AROM (degrees)  RUE AROM : WFL  RUE General AROM: Pt educated to not shoulder flex >90 degrees d/t cervical precautions-good return  LUE Strength  Gross LUE Strength: Exceptions to [facility]  L Shoulder Flex: NT  L Elbow Flex: 5/5  L Elbow Ext: 5/5  L Hand Grasp: 5/5  L Hand Release: 5/5  RUE Strength  Gross RUE Strength: Exceptions to [facility]  R Shoulder Flex: NT  R Elbow Flex: 5/5  R Elbow Ext: 5/5  R Hand Grasp: 5/5  R Hand Release: 5/5     Assessment   Performance deficits / Impairments: Decreased functional mobility ; Decreased endurance;Decreased ADL status; Decreased high-level IADLs;Decreased balance  Prognosis: Good  Decision Making: Medium Complexity  Patient Education: Safety awareness, OTPOC, discharge rec, proper c-collar donning/doffing, tripping hazards in home- cervical precautions-good

## 2018-07-23 NOTE — CARE COORDINATION
Case Management Initial Discharge Plan  Orlando Rdz,         Readmission Risk              Risk of Unplanned Readmission:        10               Met with:patient, spouse/SO or family member patient and son to discuss discharge plans.    Information verified: address, contacts, phone number, , insurance Yes  PCP: Anita Huber MD  Date of last visit: / Eder Vuong in september    Insurance Provider: medicare    Discharge Planning    Living Arrangements:  Spouse/Significant Other   Support Systems:  Spouse/Significant Other, Children    Home has 1 stories  3 stairs to climb to get into front door, 0stairs to climb to reach second floor  Location of bedroom/bathroom in home main    Patient able to perform ADL's:Independent    Current Services (outpatient & in home) none  DME equipment: walker, cane  DME provider: Abbi    [facility]   Potential Assistance Purchasing Medications:  No  Does patient want to participate in local refill/ meds to beds program?  No    Potential Assistance Needed:  N/A    Patient agreeable to home care: Yes  Freedom of choice provided:  yes    Prior SNF/Rehab Placement and Facility: none  Agreeable to SNF/Rehab: Yes  [place] of choice provided: yes- list provided   Evaluation: no    Expected Discharge date:  18  Patient expects to be discharged to:  home  Follow Up Appointment: Best Day/ Time: Monday AM    Transportation provider: geovanna  Transportation arrangements needed for discharge: Yes    Discharge Plan: [facility]        Electronically signed by Ebony William RN on 18 at 4:27 PM

## 2018-07-24 VITALS
DIASTOLIC BLOOD PRESSURE: 71 MMHG | HEIGHT: 66 IN | OXYGEN SATURATION: 93 % | WEIGHT: 120 LBS | BODY MASS INDEX: 19.29 KG/M2 | HEART RATE: 70 BPM | TEMPERATURE: 98.2 F | SYSTOLIC BLOOD PRESSURE: 136 MMHG | RESPIRATION RATE: 11 BRPM

## 2018-07-24 LAB
ABSOLUTE EOS #: <0.03 K/UL (ref 0–0.44)
ABSOLUTE IMMATURE GRANULOCYTE: <0.03 K/UL (ref 0–0.3)
ABSOLUTE LYMPH #: 0.94 K/UL (ref 1.1–3.7)
ABSOLUTE MONO #: 0.5 K/UL (ref 0.1–1.2)
ANION GAP SERPL CALCULATED.3IONS-SCNC: 8 MMOL/L (ref 9–17)
BASOPHILS # BLD: 0 % (ref 0–2)
BASOPHILS ABSOLUTE: 0.03 K/UL (ref 0–0.2)
BUN BLDV-MCNC: 11 MG/DL (ref 8–23)
BUN/CREAT BLD: ABNORMAL (ref 9–20)
CALCIUM SERPL-MCNC: 9 MG/DL (ref 8.6–10.4)
CHLORIDE BLD-SCNC: 102 MMOL/L (ref 98–107)
CO2: 26 MMOL/L (ref 20–31)
CREAT SERPL-MCNC: 0.61 MG/DL (ref 0.5–0.9)
CULTURE: NORMAL
DIFFERENTIAL TYPE: ABNORMAL
EOSINOPHILS RELATIVE PERCENT: 0 % (ref 1–4)
GFR AFRICAN AMERICAN: >60 ML/MIN
GFR NON-AFRICAN AMERICAN: >60 ML/MIN
GFR SERPL CREATININE-BSD FRML MDRD: ABNORMAL ML/MIN/{1.73_M2}
GFR SERPL CREATININE-BSD FRML MDRD: ABNORMAL ML/MIN/{1.73_M2}
GLUCOSE BLD-MCNC: 110 MG/DL (ref 70–99)
HCT VFR BLD CALC: 38.7 % (ref 36.3–47.1)
HEMOGLOBIN: 12.6 G/DL (ref 11.9–15.1)
IMMATURE GRANULOCYTES: 0 %
LYMPHOCYTES # BLD: 14 % (ref 24–43)
Lab: NORMAL
MCH RBC QN AUTO: 30.9 PG (ref 25.2–33.5)
MCHC RBC AUTO-ENTMCNC: 32.6 G/DL (ref 28.4–34.8)
MCV RBC AUTO: 94.9 FL (ref 82.6–102.9)
MONOCYTES # BLD: 7 % (ref 3–12)
NRBC AUTOMATED: 0 PER 100 WBC
PDW BLD-RTO: 13.1 % (ref 11.8–14.4)
PLATELET # BLD: 257 K/UL (ref 138–453)
PLATELET ESTIMATE: ABNORMAL
PMV BLD AUTO: 12.6 FL (ref 8.1–13.5)
POTASSIUM SERPL-SCNC: 4.9 MMOL/L (ref 3.7–5.3)
RBC # BLD: 4.08 M/UL (ref 3.95–5.11)
RBC # BLD: ABNORMAL 10*6/UL
SEG NEUTROPHILS: 78 % (ref 36–65)
SEGMENTED NEUTROPHILS ABSOLUTE COUNT: 5.41 K/UL (ref 1.5–8.1)
SODIUM BLD-SCNC: 136 MMOL/L (ref 135–144)
SPECIMEN DESCRIPTION: NORMAL
STATUS: NORMAL
WBC # BLD: 6.9 K/UL (ref 3.5–11.3)
WBC # BLD: ABNORMAL 10*3/UL

## 2018-07-24 PROCEDURE — 2580000003 HC RX 258: Performed by: STUDENT IN AN ORGANIZED HEALTH CARE EDUCATION/TRAINING PROGRAM

## 2018-07-24 PROCEDURE — 85025 COMPLETE CBC W/AUTO DIFF WBC: CPT

## 2018-07-24 PROCEDURE — 80048 BASIC METABOLIC PNL TOTAL CA: CPT

## 2018-07-24 PROCEDURE — 6360000002 HC RX W HCPCS: Performed by: STUDENT IN AN ORGANIZED HEALTH CARE EDUCATION/TRAINING PROGRAM

## 2018-07-24 PROCEDURE — 6370000000 HC RX 637 (ALT 250 FOR IP): Performed by: STUDENT IN AN ORGANIZED HEALTH CARE EDUCATION/TRAINING PROGRAM

## 2018-07-24 PROCEDURE — 99239 HOSP IP/OBS DSCHRG MGMT >30: CPT | Performed by: INTERNAL MEDICINE

## 2018-07-24 RX ORDER — OXYCODONE HYDROCHLORIDE 5 MG/1
5 TABLET ORAL EVERY 6 HOURS PRN
Qty: 10 TABLET | Refills: 0 | Status: SHIPPED | OUTPATIENT
Start: 2018-07-24 | End: 2018-07-24

## 2018-07-24 RX ORDER — OXYCODONE HYDROCHLORIDE 5 MG/1
5 TABLET ORAL EVERY 6 HOURS PRN
Qty: 10 TABLET | Refills: 0 | Status: SHIPPED | OUTPATIENT
Start: 2018-07-24 | End: 2018-07-31

## 2018-07-24 RX ADMIN — PAROXETINE HYDROCHLORIDE HEMIHYDRATE 20 MG: 20 TABLET, FILM COATED ORAL at 08:23

## 2018-07-24 RX ADMIN — OXYCODONE HYDROCHLORIDE 5 MG: 5 TABLET ORAL at 08:24

## 2018-07-24 RX ADMIN — CLONIDINE HYDROCHLORIDE 0.2 MG: 0.2 TABLET ORAL at 08:23

## 2018-07-24 RX ADMIN — Medication 1 TABLET: at 08:23

## 2018-07-24 RX ADMIN — OXYCODONE HYDROCHLORIDE 5 MG: 5 TABLET ORAL at 15:34

## 2018-07-24 RX ADMIN — FAMOTIDINE 20 MG: 20 TABLET, FILM COATED ORAL at 08:23

## 2018-07-24 RX ADMIN — MORPHINE SULFATE 2 MG: 2 INJECTION, SOLUTION INTRAMUSCULAR; INTRAVENOUS at 05:45

## 2018-07-24 RX ADMIN — MORPHINE SULFATE 2 MG: 2 INJECTION, SOLUTION INTRAMUSCULAR; INTRAVENOUS at 13:38

## 2018-07-24 RX ADMIN — SODIUM CHLORIDE: 9 INJECTION, SOLUTION INTRAVENOUS at 11:54

## 2018-07-24 RX ADMIN — MULTIPLE VITAMINS W/ MINERALS TAB 1 TABLET: TAB at 08:23

## 2018-07-24 ASSESSMENT — PAIN SCALES - GENERAL
PAINLEVEL_OUTOF10: 7
PAINLEVEL_OUTOF10: 7
PAINLEVEL_OUTOF10: 8
PAINLEVEL_OUTOF10: 9

## 2018-07-24 NOTE — PROGRESS NOTES
[facility]  Internal Medicine Residency Program  Inpatient Daily Progress Note  ______________________________________________________________________________    Patient: Mari Porras  YOB: 1932   MRN: [id number]    Acct: [de-identified]     Admit date: 7/23/2018  Today's date: 07/24/18  Number of days in the hospital: 1  Expected Discharge Date: 07/25/18    Admitting Diagnosis: Closed displaced fracture of second cervical vertebra ([address]    Subjective:   Pt seen and Chart reviewed. She is complaining of pain in the neck. She is ambulating with the help of nurse. Pt is not complaining about any weakness in any extremities. No urinary or bowel incontinence. Objective:   Vital Sign:  BP (!) 156/84   Pulse 84   Temp 97.9 °F (36.6 °C)   Resp 15   Ht 5' 6\" (1.676 m)   Wt 120 lb (54.4 kg)   SpO2 92%   BMI 19.37 kg/m²       Physical Exam:  General appearance:   alert, well appearing, and in no distress. On neck collar. Mental Status: alert, oriented to person, place, and time  Neurologic:  alert, oriented, normal speech, no focal findings or movement disorder noted  Lungs:  clear to auscultation, no wheezes, rales or rhonchi, symmetric air entry  Heart[de-identified] normal rate, regular rhythm, normal S1, S2, no murmurs, rubs, clicks or gallops  Abdomen:  soft, nontender, nondistended, no masses or organomegaly  Extremities: peripheral pulses normal, no pedal edema, no clubbing or cyanosis . Motor all extremities-5/5. Sensory- same in all extremities. DTRs -2+ biceps and knee reflexes. Plantar response-down going.   Skin: normal coloration and turgor, no rashes, no suspicious skin lesions noted    Medications:  Scheduled Medications   sodium chloride flush  10 mL Intravenous 2 times per day    cloNIDine  0.2 mg Oral BID    therapeutic multivitamin-minerals  1 tablet Oral Daily    PARoxetine  20 mg Oral Daily    simvastatin  10 mg Oral Nightly    calcium

## 2018-07-24 NOTE — CARE COORDINATION
SBIRT completed-see below          Alcohol Screening and Brief Intervention        No results for input(s): ALC in the last 72 hours. Alcohol Pre-screening     (WOMEN ONLY) How many times in the past year have you had 4 or more drinks in a day?: None    Alcohol Screening Audit       Drug Pre-Screening   How many times in the past year have you used a recreational drug or used a prescription medication for nonmedical reasons?: None    Drug Screening DAST       Mood Pre-Screening (PHQ-2)  During the past two weeks, have you been bothered by little interest or pleasure in doing things?: No  During the past two weeks, have you been bothered by feeling down, depressed, or hopeless?: No    Mood Pre-Screening (PHQ-9)         I have interviewed Divya Vang, [id number] regarding  Her alcohol consumption/drug use and risk for excessive use. Screenings were negative. Patient  N/A intervention at this time.      Deferred []    Completed on: 7/24/2018   [facility]

## 2018-07-24 NOTE — CARE COORDINATION
[place] [facility] [place] [facility]  Written by: Dorothea Ribeiro RN    Patient Name: Lorena Maurice  Attending Provider: No att. providers found  Admit Date: 2018 12:12 AM  MRN: [id number]  Account: [de-identified]                     : 1932  Discharge Date: 2018      Disposition: SNF- To [facility].  at bedside.     Dorothea Ribeiro RN

## 2018-07-24 NOTE — PROGRESS NOTES
Neurosurgery Service                                                      Daily Progress Note       Chart reviewed. No acute events or new complaints through the night . She is ambulating with help. Tolerating oral fluids and diet fine. No problems with urination bowel movement. Her pain is well-controlled with pain medication. Denies any numbness tingling weakness in her upper or lower extremities. Vitals:    07/23/18 2021 07/24/18 0047 07/24/18 0455 07/24/18 0720   BP: (!) 158/82 (!) 142/80 (!) 141/81 (!) 156/84   Pulse: 79 81 81 84   Resp: 20 13 13 15   Temp: 98.3 °F (36.8 °C) 98.8 °F (37.1 °C) 98.2 °F (36.8 °C) 97.9 °F (36.6 °C)   TempSrc: Oral Oral Oral    SpO2: 95% 93% 94% 92%   Weight:       Height:             PHYSICAL EXAMINATION:    Head: normocephalic, atraumatic, facial features unremarkable   ENT: tongue midline, Unremarkable. Neck - in hard collar   Lungs - Clear to auscultation, no use of accessory muscles, no crackles or wheezes. Cardiovascular - S1, S2, regular rate and rhythm. Abdomen - soft, non-distended, non-tender, no peritoneal inflammation signs  Back:  No midline spine tenderness  Musculoskeletal -   5/5 RUE 5/5   LUE 5/5     RLE  5/5  LLE 5/5  normal range of motion. No joints swelling, redness or stiffness. No mobility problems. Neurological exam:  Awake and AOX4. Normal speech. Comprehension was normal. [place] coma scale 15/15   PERRLA +3, EOM intact. Deep tendon reflexes are 2+ throughout, Plantar reflexes were downgoing bilaterally.     Normal sensation: Intact and equal sensation to light touch and pinprick pain  Coordination is normal. No tremors              Lab Results   Component Value Date    WBC 6.9 07/24/2018    HGB 12.6 07/24/2018    HCT 38.7 07/24/2018     07/24/2018    CHOL 189 08/17/2017    TRIG 125 08/17/2017    HDL 80 08/17/2017    ALT 12 07/23/2018    AST 31 07/23/2018     07/24/2018    K 4.9 07/24/2018     07/24/2018

## 2018-07-26 ENCOUNTER — OUTSIDE SERVICES (OUTPATIENT)
Dept: FAMILY MEDICINE CLINIC | Age: 83
End: 2018-07-26
Payer: MEDICARE

## 2018-07-26 DIAGNOSIS — E78.5 HYPERLIPIDEMIA, UNSPECIFIED HYPERLIPIDEMIA TYPE: ICD-10-CM

## 2018-07-26 DIAGNOSIS — N28.9 RENAL INSUFFICIENCY: ICD-10-CM

## 2018-07-26 DIAGNOSIS — I10 ESSENTIAL HYPERTENSION: Primary | ICD-10-CM

## 2018-07-26 DIAGNOSIS — S12.100D CLOSED DISPLACED FRACTURE OF SECOND CERVICAL VERTEBRA WITH ROUTINE HEALING, UNSPECIFIED FRACTURE MORPHOLOGY, SUBSEQUENT ENCOUNTER: ICD-10-CM

## 2018-07-26 DIAGNOSIS — M81.0 OSTEOPOROSIS, UNSPECIFIED OSTEOPOROSIS TYPE, UNSPECIFIED PATHOLOGICAL FRACTURE PRESENCE: ICD-10-CM

## 2018-07-26 DIAGNOSIS — S32.591A PUBIC RAMUS FRACTURE, RIGHT, CLOSED, INITIAL ENCOUNTER (HCC): ICD-10-CM

## 2018-07-26 DIAGNOSIS — F41.1 GENERALIZED ANXIETY DISORDER: ICD-10-CM

## 2018-07-26 PROCEDURE — 99305 1ST NF CARE MODERATE MDM 35: CPT | Performed by: FAMILY MEDICINE

## 2018-07-26 NOTE — PROGRESS NOTES
visual disturbance   ENT: negative for sore throat or nasal congestion,no dysphagia  Respiratory: no for shortness of breath , cough  Cardiovascular: no for chest pain , palpitations,pnd,no for leg edema  Gastrointestinal: no for abd pain, nausea, vomiting, diarrhea or constipation,no aminata,no blood in stool  Genitourinary: negative for dysuria, urgency,hematuria or frequency  Integument/breast: negative for skin rash or lesions  Neurological: negative for unilateral weakness, numbness or tingling. Muscular Skeletal: no   Psych no    Objective:    Vitals:   BP-146/76,Pulse-80,Respi-16,Temp-98.6  -----------------------------------------------------------------  Exam:  GEN:   A & O x3, no apparent distress  EYES: No gross abnormalities. ENT:ENT exam normal, no neck nodes or sinus tenderness  NECK: normal,  no carotid bruits  PULM: clear to auscultation bilaterally- no wheezes, rales or rhonchi, normal air movement, no respiratory distress  COR: regular rate & rhythm  ABD:  soft, non-tender, non-distended, normal bowel sounds, no masses or organomegaly  : deferred  EXT:no cyanosis, clubbing or edema present  no calf tenderness, and warm to touch. NEURO: Motor and sensory grossly intact  PSYCH: non focal  SKIN:  No skin lesions or rashes    -----------------------------------------------------------------  Diagnostic Data:   · All diagnostic data was reviewed    Assessment:      · No diagnosis found.   Patient Active Problem List   Diagnosis Code    Hypertension I10    Hyperlipidemia E78.5    Renal insufficiency N28.9    Anxiety disorder F41.9    Osteoporosis M81.0    [facility], right, [facility] S32.591A    Closed displaced fracture of second cervical vertebra ([facility] S12.100A   ·       Plan:     Continue therapy  Continue current medications  Prevent pressure sore  Prevent falls    Electronically signed by Valdo Phillips MD on 7/26/2018 at 3:13 PM

## 2018-07-29 LAB
CULTURE: NORMAL
CULTURE: NORMAL
Lab: NORMAL
Lab: NORMAL
SPECIMEN DESCRIPTION: NORMAL
SPECIMEN DESCRIPTION: NORMAL
STATUS: NORMAL
STATUS: NORMAL

## 2018-07-30 ENCOUNTER — TELEPHONE (OUTPATIENT)
Dept: INTERNAL MEDICINE | Age: 83
End: 2018-07-30

## 2018-07-30 NOTE — TELEPHONE ENCOUNTER
Spoke with Maribell Suarez at [address] [facility]. She reports narcotic orders must go through the facility and she will make the family aware of this. Maribell Suarez reports Tylenol is ineffective. Percocet does help however order was given for norco at the facility this morning by Tiffani Pinon CNP.

## 2018-07-31 ENCOUNTER — TELEPHONE (OUTPATIENT)
Dept: INTERNAL MEDICINE | Age: 83
End: 2018-07-31

## 2018-07-31 DIAGNOSIS — Z91.09 ENVIRONMENTAL ALLERGIES: Primary | ICD-10-CM

## 2018-07-31 DIAGNOSIS — F41.1 ANXIETY NEUROSIS: ICD-10-CM

## 2018-07-31 RX ORDER — CLONIDINE HYDROCHLORIDE 0.2 MG/1
TABLET ORAL
Qty: 180 TABLET | Refills: 3 | OUTPATIENT
Start: 2018-07-31

## 2018-07-31 RX ORDER — ALENDRONATE SODIUM 70 MG/1
TABLET ORAL
Qty: 12 TABLET | Refills: 3 | OUTPATIENT
Start: 2018-07-31

## 2018-07-31 RX ORDER — SIMVASTATIN 10 MG
TABLET ORAL
Qty: 90 TABLET | Refills: 3 | OUTPATIENT
Start: 2018-07-31

## 2018-07-31 RX ORDER — FLUTICASONE PROPIONATE 50 MCG
SPRAY, SUSPENSION (ML) NASAL
Qty: 3 BOTTLE | Refills: 3 | Status: SHIPPED | OUTPATIENT
Start: 2018-07-31

## 2018-07-31 RX ORDER — PAROXETINE HYDROCHLORIDE 20 MG/1
TABLET, FILM COATED ORAL
Qty: 90 TABLET | Refills: 3 | OUTPATIENT
Start: 2018-07-31

## 2018-07-31 NOTE — TELEPHONE ENCOUNTER
Last appt: 7/31/2018  Next appt: 9/10/2018    Fax from [address] relays pain meds are ineffective says tylenol works just as well at the times. We were wondering if you would consider adding a muscle relaxant.   This has been most effective with spinal mishaps

## 2018-07-31 NOTE — TELEPHONE ENCOUNTER
Last appt: 7/31/2018  Next appt: 9/10/2018    Do you want her to continue both Norco and percocet? We received Norco 15 tab last night.    Please send scripts or call ,meds to [facility]

## 2018-08-06 DIAGNOSIS — S32.591A PUBIC RAMUS FRACTURE, RIGHT, CLOSED, INITIAL ENCOUNTER (HCC): ICD-10-CM

## 2018-08-06 RX ORDER — HYDROCODONE BITARTRATE AND ACETAMINOPHEN 5; 325 MG/1; MG/1
1 TABLET ORAL EVERY 6 HOURS PRN
Qty: 28 TABLET | Refills: 0
Start: 2018-08-06 | End: 2018-08-13

## 2018-08-10 ENCOUNTER — OUTSIDE SERVICES (OUTPATIENT)
Dept: INTERNAL MEDICINE | Age: 83
End: 2018-08-10
Payer: MEDICARE

## 2018-08-10 DIAGNOSIS — M62.838 MUSCLE SPASM: Primary | ICD-10-CM

## 2018-08-10 DIAGNOSIS — S12.100D CLOSED DISPLACED FRACTURE OF SECOND CERVICAL VERTEBRA WITH ROUTINE HEALING, UNSPECIFIED FRACTURE MORPHOLOGY, SUBSEQUENT ENCOUNTER: ICD-10-CM

## 2018-08-10 PROCEDURE — 99308 SBSQ NF CARE LOW MDM 20: CPT | Performed by: NURSE PRACTITIONER

## 2018-08-10 ASSESSMENT — ENCOUNTER SYMPTOMS: BACK PAIN: 0

## 2018-08-10 NOTE — PROGRESS NOTES
Mood, memory, affect and judgment normal.   Nursing note and vitals reviewed. Vital Signs: Stable, afebrile    Assessment:  1. Muscle spasm      2. Closed displaced fracture of second cervical vertebra with routine healing, unspecified fracture morphology, subsequent encounter        Plan: With her age and currently on narcotics suggest we try a muscle rub along with PT to stretch out the muscles. If persists or worsens they're to notify me. Would shy away from muscle relaxants due to her age along with recurrent falls. Follow up for routine visit. Call sooner with concerns prior.     Electronically signed by CALEB Hall CNP on 8/10/2018 at 1:41 PM

## 2018-08-28 DIAGNOSIS — E78.5 HYPERLIPIDEMIA: ICD-10-CM

## 2018-08-28 RX ORDER — SIMVASTATIN 10 MG
TABLET ORAL
Qty: 90 TABLET | Refills: 3 | Status: SHIPPED | OUTPATIENT
Start: 2018-08-28 | End: 2019-02-22 | Stop reason: SDUPTHER

## 2018-08-29 ENCOUNTER — OUTSIDE SERVICES (OUTPATIENT)
Dept: INTERNAL MEDICINE | Age: 83
End: 2018-08-29
Payer: MEDICARE

## 2018-08-29 DIAGNOSIS — I10 ESSENTIAL HYPERTENSION: ICD-10-CM

## 2018-08-29 DIAGNOSIS — S12.100G CLOSED DISPLACED FRACTURE OF SECOND CERVICAL VERTEBRA WITH DELAYED HEALING, UNSPECIFIED FRACTURE MORPHOLOGY, SUBSEQUENT ENCOUNTER: Primary | ICD-10-CM

## 2018-08-29 DIAGNOSIS — E78.5 HYPERLIPIDEMIA, UNSPECIFIED HYPERLIPIDEMIA TYPE: ICD-10-CM

## 2018-08-29 PROBLEM — S32.591A PUBIC RAMUS FRACTURE, RIGHT, CLOSED, INITIAL ENCOUNTER (HCC): Status: RESOLVED | Noted: 2018-04-09 | Resolved: 2018-08-29

## 2018-08-29 PROCEDURE — 99308 SBSQ NF CARE LOW MDM 20: CPT | Performed by: NURSE PRACTITIONER

## 2018-08-29 RX ORDER — HYDROCODONE BITARTRATE AND ACETAMINOPHEN 5; 325 MG/1; MG/1
1 TABLET ORAL EVERY 8 HOURS PRN
Qty: 20 TABLET | Refills: 0 | Status: SHIPPED | OUTPATIENT
Start: 2018-08-29 | End: 2018-09-05

## 2018-08-29 ASSESSMENT — ENCOUNTER SYMPTOMS
BLOOD IN STOOL: 0
VOMITING: 0
EYE PAIN: 0
COUGH: 0
DIARRHEA: 0
SORE THROAT: 0
EYE DISCHARGE: 0
EYE REDNESS: 0
ORTHOPNEA: 0
SHORTNESS OF BREATH: 0
WHEEZING: 0
CONSTIPATION: 0
NAUSEA: 0
ABDOMINAL PAIN: 0

## 2018-08-29 NOTE — PROGRESS NOTES
DILATION AND CURETTAGE OF UTERUS  50-    (for menometrrohagia).  OTHER SURGICAL HISTORY  02-    YAG laser capsulotomy - right eye. (Dr. Dede Dudley).  OTHER SURGICAL HISTORY  08-    (Dr. Nino Palumbo) - Laparoscopic colostomy takedown with lysis of adhesions and colorectal anastomosis using the EEA-25 staple. (Also, preoperative bilateral lighted stent insertion per Dr. Quentin Wilson).  SMALL INTESTINE SURGERY  08-    (Dr. Nino Palumbo).  UMBILICAL HERNIA REPAIR  45-    YAG CAPSULOTOMY         [facility] Grecia Calhoun     Social History     Social History    Marital status:      Spouse name: N/A    Number of children: N/A    Years of education: N/A     Occupational History    Not on file. Social History Main Topics    Smoking status: Never Smoker    Smokeless tobacco: Never Used    Alcohol use No    Drug use: No    Sexual activity: Not on file     Other Topics Concern    Not on file     Social History Narrative    No narrative on file       Review of Systems   Constitutional: Negative for chills, fever and malaise/fatigue. HENT: Negative for sore throat. Eyes: Negative for pain, discharge and redness. Respiratory: Negative for cough, shortness of breath and wheezing. Cardiovascular: Negative for chest pain, orthopnea and leg swelling. Gastrointestinal: Negative for abdominal pain, blood in stool, constipation, diarrhea, nausea and vomiting. Genitourinary: Negative for dysuria and urgency. Musculoskeletal: Positive for neck pain. Negative for myalgias. Skin: Negative for rash. Neurological: Positive for weakness (slowly improving ). Negative for dizziness, tremors, seizures and headaches. Psychiatric/Behavioral: Negative for depression and memory loss. The patient is not nervous/anxious. Physical Exam   Constitutional: She is oriented to person, place, and time and well-developed, well-nourished, and in no distress. No distress. HENT:   Head: Normocephalic and atraumatic. Right Ear: External ear normal.   Left Ear: External ear normal.   Eyes: Right eye exhibits no discharge. Left eye exhibits no discharge. Neck: Neck supple. No tracheal deviation present. Cardiovascular: Normal rate and regular rhythm. Pulmonary/Chest: Effort normal and breath sounds normal. No respiratory distress. Abdominal: Soft. Bowel sounds are normal.   Musculoskeletal: She exhibits no edema. Cervical back: She exhibits decreased range of motion (C Collar in place as ordered ), tenderness and pain. She exhibits no bony tenderness, no swelling, no edema and no spasm. Neurological: She is alert and oriented to person, place, and time. No cranial nerve deficit. Gait normal. Coordination normal.   Skin: Skin is warm and dry. No rash noted. She is not diaphoretic. Psychiatric: Mood, memory, affect and judgment normal.   Nursing note and vitals reviewed. Vital Signs: Stable, reviewed on point click care  Assessment:  1. Closed displaced fracture of second cervical vertebra with delayed healing, unspecified fracture morphology, subsequent encounter  We'll get her a prescription for a hospital bed due to her requiring physician that is not feasible with an ordinary bed to alleviate pain or for proper body alignment with the c-collar    - HYDROcodone-acetaminophen (NORCO) 5-325 MG per tablet; Take 1 tablet by mouth every 8 hours as needed for Pain for up to 7 days. .  Dispense: 20 tablet; Refill: 0    2. Essential hypertension  Stable, continue to monitor    3. Hyperlipidemia, unspecified hyperlipidemia type  Stable, continue to work on diet and increase activity  Continues on Zocor      Plan:  As noted above. Follow up for routine visit. Call sooner with concerns prior.     Electronically signed by CALEB Power CNP on 8/29/2018 at 9:55 AM

## 2018-09-10 ENCOUNTER — OFFICE VISIT (OUTPATIENT)
Dept: INTERNAL MEDICINE | Age: 83
End: 2018-09-10
Payer: MEDICARE

## 2018-09-10 VITALS
WEIGHT: 114 LBS | SYSTOLIC BLOOD PRESSURE: 115 MMHG | DIASTOLIC BLOOD PRESSURE: 80 MMHG | HEIGHT: 65 IN | BODY MASS INDEX: 18.99 KG/M2 | HEART RATE: 68 BPM | RESPIRATION RATE: 16 BRPM

## 2018-09-10 DIAGNOSIS — M81.0 OSTEOPOROSIS, UNSPECIFIED OSTEOPOROSIS TYPE, UNSPECIFIED PATHOLOGICAL FRACTURE PRESENCE: ICD-10-CM

## 2018-09-10 DIAGNOSIS — S12.100A CLOSED DISPLACED FRACTURE OF SECOND CERVICAL VERTEBRA, UNSPECIFIED FRACTURE MORPHOLOGY, INITIAL ENCOUNTER (HCC): ICD-10-CM

## 2018-09-10 DIAGNOSIS — E78.5 HYPERLIPIDEMIA, UNSPECIFIED HYPERLIPIDEMIA TYPE: ICD-10-CM

## 2018-09-10 DIAGNOSIS — Z12.39 ENCOUNTER FOR SCREENING FOR MALIGNANT NEOPLASM OF BREAST: ICD-10-CM

## 2018-09-10 DIAGNOSIS — F41.1 GENERALIZED ANXIETY DISORDER: ICD-10-CM

## 2018-09-10 DIAGNOSIS — Z00.00 ROUTINE GENERAL MEDICAL EXAMINATION AT A HEALTH CARE FACILITY: Primary | ICD-10-CM

## 2018-09-10 DIAGNOSIS — I10 ESSENTIAL HYPERTENSION: ICD-10-CM

## 2018-09-10 DIAGNOSIS — Z78.0 MENOPAUSE: ICD-10-CM

## 2018-09-10 DIAGNOSIS — Z00.00 MEDICARE ANNUAL WELLNESS VISIT, SUBSEQUENT: ICD-10-CM

## 2018-09-10 PROCEDURE — G8399 PT W/DXA RESULTS DOCUMENT: HCPCS | Performed by: INTERNAL MEDICINE

## 2018-09-10 PROCEDURE — 99213 OFFICE O/P EST LOW 20 MIN: CPT | Performed by: INTERNAL MEDICINE

## 2018-09-10 PROCEDURE — 1101F PT FALLS ASSESS-DOCD LE1/YR: CPT | Performed by: INTERNAL MEDICINE

## 2018-09-10 PROCEDURE — 4040F PNEUMOC VAC/ADMIN/RCVD: CPT | Performed by: INTERNAL MEDICINE

## 2018-09-10 PROCEDURE — 1123F ACP DISCUSS/DSCN MKR DOCD: CPT | Performed by: INTERNAL MEDICINE

## 2018-09-10 PROCEDURE — G0439 PPPS, SUBSEQ VISIT: HCPCS | Performed by: INTERNAL MEDICINE

## 2018-09-10 PROCEDURE — G8420 CALC BMI NORM PARAMETERS: HCPCS | Performed by: INTERNAL MEDICINE

## 2018-09-10 PROCEDURE — 1090F PRES/ABSN URINE INCON ASSESS: CPT | Performed by: INTERNAL MEDICINE

## 2018-09-10 PROCEDURE — 1036F TOBACCO NON-USER: CPT | Performed by: INTERNAL MEDICINE

## 2018-09-10 PROCEDURE — G8427 DOCREV CUR MEDS BY ELIG CLIN: HCPCS | Performed by: INTERNAL MEDICINE

## 2018-09-10 ASSESSMENT — ENCOUNTER SYMPTOMS
COUGH: 0
NAUSEA: 0
EYE PAIN: 0
BACK PAIN: 0
SHORTNESS OF BREATH: 0
BLOOD IN STOOL: 0
CONSTIPATION: 0
ABDOMINAL PAIN: 0
DIARRHEA: 0
VOMITING: 0

## 2018-09-10 ASSESSMENT — LIFESTYLE VARIABLES: HOW OFTEN DO YOU HAVE A DRINK CONTAINING ALCOHOL: 0

## 2018-09-10 ASSESSMENT — ANXIETY QUESTIONNAIRES: GAD7 TOTAL SCORE: 2

## 2018-09-10 ASSESSMENT — PATIENT HEALTH QUESTIONNAIRE - PHQ9
SUM OF ALL RESPONSES TO PHQ QUESTIONS 1-9: 0
SUM OF ALL RESPONSES TO PHQ QUESTIONS 1-9: 0

## 2018-09-10 NOTE — PROGRESS NOTES
(MULTIVITAMIN PO) Take 1 tablet by mouth daily. Yes Historical Provider, MD   aspirin 81 MG tablet Take 81 mg by mouth daily. Yes Historical Provider, MD       Past Medical History:   Diagnosis Date    Anxiety neurosis     Cataract     (Left eye).  History of peritonitis 2010    Hyperlipidemia     Hypertension     Macular edema     Pelvis fracture (HCC)     Pseudophakos     (Right eye).  Renal insufficiency     (Mild)     Past Surgical History:   Procedure Laterality Date    ABDOMINAL EXPLORATION SURGERY  04-    (Dr. Ayesha Lawler) - With sigmoid colectomy and left colostomy with a Sukhwinder's procedure.  CATARACT REMOVAL     [address], LAPAROSCOPIC  19-    COLONOSCOPY  06-    (Dr. Ayesha Lawler) Status post Sukhwinder's procedure.  COLPOSCOPY  Remote    (for YISSEL-1).  CYSTOSCOPY  08-    (Dr. Ayesha Lawler) - Bilateral ureteral catheters.  DILATION AND CURETTAGE OF UTERUS  19-    (for post-menopausal bleeding).  DILATION AND CURETTAGE OF UTERUS  81-    (for menometrrohagia).  OTHER SURGICAL HISTORY  02-    YAG laser capsulotomy - right eye. (Dr. Dandy Pérez).  OTHER SURGICAL HISTORY  08-    (Dr. Ayesha Lawler) - Laparoscopic colostomy takedown with lysis of adhesions and colorectal anastomosis using the EEA-25 staple. (Also, preoperative bilateral lighted stent insertion per Dr. Yessenia Yadav).  SMALL INTESTINE SURGERY  08-    (Dr. Ayesha Lawler).  UMBILICAL HERNIA REPAIR  71-    YAG CAPSULOTOMY         Family History   Problem Relation Age of Onset    Cancer Mother         (unknown type)    Cancer Sister         (mediastinal cancer - unknown type).  Cancer Brother         (Stomach).     Cervical Cancer Neg Hx     Glaucoma Neg Hx        CareTeam (Including outside providers/suppliers regularly involved in providing care):   Patient Care Team:  Fritz Álvarez MD as PCP - General (Internal Medicine)  CALEB Browning - VAL as PCP - [facility] Attributed Provider    Wt Readings from Last 3 Encounters:   09/10/18 114 lb (51.7 kg)   07/23/18 120 lb (54.4 kg)   07/22/18 116 lb (52.6 kg)     Vitals:    09/10/18 1004   BP: 115/80   Site: Left Upper Arm   Position: Sitting   Cuff Size: Medium Adult   Pulse: 68   Resp: 16   Weight: 114 lb (51.7 kg)   Height: 5' 5\" (1.651 m)     Body mass index is 18.97 kg/m². Patient's complete Health Risk Assessment and screening values have been reviewed and are found in Flowsheets. The following problems were reviewed today and where indicated follow up appointments were made and/or referrals ordered. Positive Risk Factor Screenings with Interventions:     General Health:  General  In general, how would you say your health is?: Very Good  In the past 7 days, have you experienced any of the following?: (!) New or Increased Fatigue  Do you get the social and emotional support that you need?: Yes  Do you have a Living Will?: Yes  General Health Risk Interventions:  · None indicated    Health Habits/Nutrition:  Health Habits/Nutrition  Do you exercise for at least 20 minutes 2-3 times per week?: Yes  Have you lost any weight without trying in the past 3 months?: No  Do you eat fewer than 2 meals per day?: No  Have you seen a dentist within the past year?: (!) No  Body mass index is 18.97 kg/m².   Health Habits/Nutrition Interventions:  · None indicated    Hearing/Vision:  Hearing/Vision  Do you or your family notice any trouble with your hearing?: (!) Yes  Do you have difficulty driving, watching TV, or doing any of your daily activities because of your eyesight?: No  Have you had an eye exam within the past year?: (!) No  Hearing/Vision Interventions:  · None indicated    ADL:  ADLs  In the past 7 days, did you need help from others to perform any of the following everyday activities?: None  In the past 7 days, did you need help from others to take care of any of the following?: [facility] [employer], [facility] [employer], [facility] [employer],

## 2018-09-10 NOTE — PROGRESS NOTES
[address] [facility] [facility]  [address] [address] [id number]  Dept: [phone]  Dept Fax: [phone]  Loc: [phone]    Orlando Rdz is a 80 y.o. female who presents today for her medical conditions/complaints as noted below. Orlando Rdz is c/o of   Chief Complaint   Patient presents with    Medicare AWV     6 month    Hypertension    Hyperlipidemia         HPI:     Hypertension   This is a chronic (essential htn) problem. The current episode started more than 1 year ago. The problem has been waxing and waning since onset. The problem is controlled. Pertinent negatives include no chest pain, headaches, neck pain, palpitations or shortness of breath. Hyperlipidemia   This is a chronic problem. The current episode started more than 1 year ago. The problem is controlled. Recent lipid tests were reviewed and are variable. Pertinent negatives include no chest pain or shortness of breath. Other   This is a recurrent (3-Gen. medical exam, 4-anxiety) problem. The current episode started today. The problem occurs intermittently. The problem has been waxing and waning. Pertinent negatives include no abdominal pain, arthralgias, chest pain, chills, coughing, fever, headaches, nausea, neck pain, numbness, rash, vomiting or weakness. No results found for: LABA1C             No results found for: LABMICR  LDL Cholesterol (mg/dL)   Date Value   08/17/2017 84   08/11/2016 77   08/10/2015 96         AST (U/L)   Date Value   07/23/2018 31     ALT (U/L)   Date Value   07/23/2018 12     BUN (mg/dL)   Date Value   07/24/2018 11     BP Readings from Last 3 Encounters:   09/10/18 115/80   07/24/18 136/71   07/22/18 137/78              Past Medical History:   Diagnosis Date    Anxiety neurosis     Cataract     (Left eye).  History of peritonitis 2010    Hyperlipidemia     Hypertension     Macular edema     Pelvis fracture (HCC)     Pseudophakos     (Right eye).     Renal MOUTH EVERY 7 DAYS AS DIRECTED 12 tablet 3    PARoxetine (PAXIL) 20 MG tablet TAKE 1 TABLET BY MOUTH DAILY 90 tablet 3    acetaminophen (TYLENOL) 325 MG tablet Take 2 tablets by mouth every 4 hours as needed for Pain or Fever 120 tablet 0    docusate (COLACE, DULCOLAX) 100 MG CAPS Take 100 mg by mouth 2 times daily 30 capsule 0    cloNIDine (CATAPRES) 0.2 MG tablet TAKE 1 TABLET BY MOUTH TWICE DAILY 180 tablet 3    calcium carbonate-vitamin D (CALTRATE 600+D) 600-400 MG-UNIT TABS per tab Take 1 tablet by mouth 2 times daily.  Biotin 5000 MCG TABS Take 5,000 mcg by mouth daily.  Multiple Vitamins-Minerals (MULTIVITAMIN PO) Take 1 tablet by mouth daily.  aspirin 81 MG tablet Take 81 mg by mouth daily. No current facility-administered medications for this visit. Allergies   Allergen Reactions    Bactrim [Sulfamethoxazole-Trimethoprim]     Flagyl [Metronidazole]        Health Maintenance   Topic Date Due    DTaP/Tdap/Td vaccine (1 - Tdap) 09/23/1951    Shingles Vaccine (1 of 2 - 2 Dose Series) 09/16/2012    Flu vaccine (1) 09/01/2018    DEXA (modify frequency per FRAX score)  Completed    Pneumococcal low/med risk  Completed       Subjective:      Review of Systems   Constitutional: Negative for chills and fever. HENT: Negative for hearing loss. Eyes: Negative for pain and visual disturbance. Respiratory: Negative for cough and shortness of breath. Cardiovascular: Negative for chest pain, palpitations and leg swelling. Gastrointestinal: Negative for abdominal pain, blood in stool, constipation, diarrhea, nausea and vomiting. Endocrine: Negative for cold intolerance, polydipsia and polyuria. Genitourinary: Negative for difficulty urinating, dysuria and hematuria. Musculoskeletal: Negative for arthralgias, back pain, gait problem and neck pain. Skin: Negative for pallor and rash. Neurological: Negative for dizziness, weakness, numbness and headaches. Hematological: Negative for adenopathy. Does not bruise/bleed easily. Psychiatric/Behavioral: Negative for confusion. The patient is not nervous/anxious. Objective:     Physical Exam   Constitutional: She is oriented to person, place, and time. She appears well-developed and well-nourished. HENT:   Head: Normocephalic and atraumatic. Eyes: Pupils are equal, round, and reactive to light. EOM are normal.   Neck: Neck supple. Cardiovascular: Normal rate and regular rhythm. Exam reveals no gallop and no friction rub. No murmur heard. Pulmonary/Chest: Effort normal and breath sounds normal. She has no wheezes. She has no rales. Abdominal: Soft. She exhibits no distension and no mass. There is no tenderness. There is no rebound. Musculoskeletal: Normal range of motion. She exhibits no edema. Lymphadenopathy:     She has no cervical adenopathy. Neurological: She is alert and oriented to person, place, and time. No cranial nerve deficit (grossly). Skin: Skin is warm and dry. No rash noted. Psychiatric: She has a normal mood and affect. Thought content normal.   Vitals reviewed. /80 (Site: Left Upper Arm, Position: Sitting, Cuff Size: Medium Adult)   Pulse 68   Resp 16   Ht 5' 5\" (1.651 m)   Wt 114 lb (51.7 kg)   BMI 18.97 kg/m²     Assessment:       Diagnosis Orders   1. Routine general medical examination at a health care facility     2. Medicare annual wellness visit, subsequent     3. Essential hypertension  TSH    T4, Free   4. Hyperlipidemia, unspecified hyperlipidemia type  TSH    T4, Free   5. Osteoporosis, unspecified osteoporosis type, unspecified pathological fracture presence     6. Encounter for screening for malignant neoplasm of breast  JOHN DIGITAL SCREEN W OR WO CAD BILATERAL   7. Menopause  DEXA BONE DENSITY 2 SITES   8. Closed displaced fracture of second cervical vertebra, unspecified fracture morphology, initial encounter ([facility] [address]     9.  Generalized anxiety

## 2018-09-17 ENCOUNTER — HOSPITAL ENCOUNTER (OUTPATIENT)
Dept: LAB | Age: 83
Setting detail: SPECIMEN
Discharge: HOME | End: 2018-09-17
Payer: MEDICARE

## 2018-09-17 DIAGNOSIS — E03.9 HYPOTHYROIDISM, UNSPECIFIED TYPE: Primary | ICD-10-CM

## 2018-09-17 DIAGNOSIS — M81.0 OSTEOPOROSIS, UNSPECIFIED OSTEOPOROSIS TYPE, UNSPECIFIED PATHOLOGICAL FRACTURE PRESENCE: ICD-10-CM

## 2018-09-17 DIAGNOSIS — E78.5 HYPERLIPIDEMIA, UNSPECIFIED HYPERLIPIDEMIA TYPE: ICD-10-CM

## 2018-09-17 DIAGNOSIS — I10 ESSENTIAL HYPERTENSION: ICD-10-CM

## 2018-09-17 LAB
ALBUMIN SERPL-MCNC: 4.4 G/DL (ref 3.5–5.2)
ALBUMIN/GLOBULIN RATIO: 1 (ref 1–2.5)
ALP BLD-CCNC: 77 U/L (ref 35–104)
ALT SERPL-CCNC: 8 U/L (ref 5–33)
ANION GAP SERPL CALCULATED.3IONS-SCNC: 11 MMOL/L (ref 9–17)
AST SERPL-CCNC: 22 U/L
BILIRUB SERPL-MCNC: 0.49 MG/DL (ref 0.3–1.2)
BUN BLDV-MCNC: 23 MG/DL (ref 8–23)
BUN/CREAT BLD: 26 (ref 9–20)
CALCIUM SERPL-MCNC: 9.8 MG/DL (ref 8.6–10.4)
CHLORIDE BLD-SCNC: 98 MMOL/L (ref 98–107)
CHOLESTEROL/HDL RATIO: 2.2
CHOLESTEROL: 197 MG/DL
CO2: 30 MMOL/L (ref 20–31)
CREAT SERPL-MCNC: 0.9 MG/DL (ref 0.5–0.9)
GFR AFRICAN AMERICAN: >60 ML/MIN
GFR NON-AFRICAN AMERICAN: 60 ML/MIN
GFR SERPL CREATININE-BSD FRML MDRD: ABNORMAL ML/MIN/{1.73_M2}
GFR SERPL CREATININE-BSD FRML MDRD: ABNORMAL ML/MIN/{1.73_M2}
GLUCOSE BLD-MCNC: 107 MG/DL (ref 70–99)
HDLC SERPL-MCNC: 88 MG/DL
LDL CHOLESTEROL: 85 MG/DL (ref 0–130)
POTASSIUM SERPL-SCNC: 4.3 MMOL/L (ref 3.7–5.3)
SODIUM BLD-SCNC: 139 MMOL/L (ref 135–144)
THYROXINE, FREE: 0.88 NG/DL (ref 0.93–1.7)
TOTAL PROTEIN: 8.9 G/DL (ref 6.4–8.3)
TRIGL SERPL-MCNC: 121 MG/DL
TSH SERPL DL<=0.05 MIU/L-ACNC: 2.57 MIU/L (ref 0.3–5)
VLDLC SERPL CALC-MCNC: NORMAL MG/DL (ref 1–30)

## 2018-09-17 PROCEDURE — 84443 ASSAY THYROID STIM HORMONE: CPT

## 2018-09-17 PROCEDURE — 80053 COMPREHEN METABOLIC PANEL: CPT

## 2018-09-17 PROCEDURE — 36415 COLL VENOUS BLD VENIPUNCTURE: CPT

## 2018-09-17 PROCEDURE — 84439 ASSAY OF FREE THYROXINE: CPT

## 2018-09-17 PROCEDURE — 80061 LIPID PANEL: CPT

## 2018-09-17 RX ORDER — LEVOTHYROXINE SODIUM 0.03 MG/1
25 TABLET ORAL DAILY
Qty: 30 TABLET | Refills: 3 | Status: SHIPPED | OUTPATIENT
Start: 2018-09-17 | End: 2018-11-26 | Stop reason: SDUPTHER

## 2018-09-17 NOTE — TELEPHONE ENCOUNTER
----- Message from Noel Veras MD sent at 9/17/2018 12:33 PM EDT -----  She is slightly low on thyroid hormone, with other labs relatively stable and okay.       Pend  Rx for Synthroid 25 µg daily,   and then repeat TSH and free T4 in 6 weeks

## 2018-09-18 ENCOUNTER — TELEPHONE (OUTPATIENT)
Dept: INTERNAL MEDICINE | Age: 83
End: 2018-09-18
Payer: MEDICARE

## 2018-09-18 DIAGNOSIS — F41.9 ANXIETY DISORDER, UNSPECIFIED TYPE: ICD-10-CM

## 2018-09-18 DIAGNOSIS — L89.322 PRESSURE ULCER OF LEFT BUTTOCK, STAGE 2 (HCC): ICD-10-CM

## 2018-09-18 DIAGNOSIS — I10 ESSENTIAL (PRIMARY) HYPERTENSION: ICD-10-CM

## 2018-09-18 DIAGNOSIS — S12.100D UNSPECIFIED DISPLACED FRACTURE OF SECOND CERVICAL VERTEBRA, SUBSEQUENT ENCOUNTER FOR FRACTURE WITH ROUTINE HEALING: Primary | ICD-10-CM

## 2018-09-18 PROCEDURE — G0180 MD CERTIFICATION HHA PATIENT: HCPCS | Performed by: INTERNAL MEDICINE

## 2018-11-01 ENCOUNTER — NURSE ONLY (OUTPATIENT)
Dept: LAB | Age: 83
End: 2018-11-01
Payer: MEDICARE

## 2018-11-01 DIAGNOSIS — Z23 NEED FOR VACCINATION: Primary | ICD-10-CM

## 2018-11-01 PROCEDURE — 90662 IIV NO PRSV INCREASED AG IM: CPT | Performed by: INTERNAL MEDICINE

## 2018-11-01 PROCEDURE — G0008 ADMIN INFLUENZA VIRUS VAC: HCPCS | Performed by: INTERNAL MEDICINE

## 2018-11-26 DIAGNOSIS — E03.9 HYPOTHYROIDISM, UNSPECIFIED TYPE: ICD-10-CM

## 2018-11-26 RX ORDER — LEVOTHYROXINE SODIUM 0.03 MG/1
25 TABLET ORAL DAILY
Qty: 90 TABLET | Refills: 3 | Status: SHIPPED | OUTPATIENT
Start: 2018-11-26 | End: 2018-12-13 | Stop reason: DRUGHIGH

## 2018-12-13 ENCOUNTER — HOSPITAL ENCOUNTER (OUTPATIENT)
Dept: LAB | Age: 83
Discharge: HOME | End: 2018-12-13
Payer: MEDICARE

## 2018-12-13 ENCOUNTER — TELEPHONE (OUTPATIENT)
Dept: INTERNAL MEDICINE | Age: 83
End: 2018-12-13

## 2018-12-13 ENCOUNTER — HOSPITAL ENCOUNTER (OUTPATIENT)
Dept: BONE DENSITY | Age: 83
Discharge: HOME | End: 2018-12-15
Payer: MEDICARE

## 2018-12-13 ENCOUNTER — OFFICE VISIT (OUTPATIENT)
Dept: INTERNAL MEDICINE | Age: 83
End: 2018-12-13
Payer: MEDICARE

## 2018-12-13 ENCOUNTER — HOSPITAL ENCOUNTER (OUTPATIENT)
Dept: MAMMOGRAPHY | Age: 83
Discharge: HOME | End: 2018-12-15
Payer: MEDICARE

## 2018-12-13 VITALS
DIASTOLIC BLOOD PRESSURE: 60 MMHG | SYSTOLIC BLOOD PRESSURE: 102 MMHG | WEIGHT: 112 LBS | HEIGHT: 65 IN | BODY MASS INDEX: 18.66 KG/M2 | RESPIRATION RATE: 16 BRPM | HEART RATE: 76 BPM

## 2018-12-13 DIAGNOSIS — E03.9 HYPOTHYROIDISM, UNSPECIFIED TYPE: Primary | ICD-10-CM

## 2018-12-13 DIAGNOSIS — Z12.39 BREAST CANCER SCREENING: ICD-10-CM

## 2018-12-13 DIAGNOSIS — E03.9 HYPOTHYROIDISM, UNSPECIFIED TYPE: ICD-10-CM

## 2018-12-13 DIAGNOSIS — E78.5 HYPERLIPIDEMIA, UNSPECIFIED HYPERLIPIDEMIA TYPE: ICD-10-CM

## 2018-12-13 DIAGNOSIS — I10 ESSENTIAL HYPERTENSION: Primary | ICD-10-CM

## 2018-12-13 DIAGNOSIS — Z12.39 ENCOUNTER FOR SCREENING FOR MALIGNANT NEOPLASM OF BREAST: ICD-10-CM

## 2018-12-13 DIAGNOSIS — Z78.0 MENOPAUSE: ICD-10-CM

## 2018-12-13 LAB
THYROXINE, FREE: 0.89 NG/DL (ref 0.93–1.7)
TSH SERPL DL<=0.05 MIU/L-ACNC: 2.22 MIU/L (ref 0.3–5)

## 2018-12-13 PROCEDURE — G8482 FLU IMMUNIZE ORDER/ADMIN: HCPCS | Performed by: INTERNAL MEDICINE

## 2018-12-13 PROCEDURE — G8420 CALC BMI NORM PARAMETERS: HCPCS | Performed by: INTERNAL MEDICINE

## 2018-12-13 PROCEDURE — 3288F FALL RISK ASSESSMENT DOCD: CPT | Performed by: INTERNAL MEDICINE

## 2018-12-13 PROCEDURE — 77067 SCR MAMMO BI INCL CAD: CPT

## 2018-12-13 PROCEDURE — 77063 BREAST TOMOSYNTHESIS BI: CPT

## 2018-12-13 PROCEDURE — G8427 DOCREV CUR MEDS BY ELIG CLIN: HCPCS | Performed by: INTERNAL MEDICINE

## 2018-12-13 PROCEDURE — 84443 ASSAY THYROID STIM HORMONE: CPT

## 2018-12-13 PROCEDURE — 1090F PRES/ABSN URINE INCON ASSESS: CPT | Performed by: INTERNAL MEDICINE

## 2018-12-13 PROCEDURE — 1100F PTFALLS ASSESS-DOCD GE2>/YR: CPT | Performed by: INTERNAL MEDICINE

## 2018-12-13 PROCEDURE — 1123F ACP DISCUSS/DSCN MKR DOCD: CPT | Performed by: INTERNAL MEDICINE

## 2018-12-13 PROCEDURE — 4040F PNEUMOC VAC/ADMIN/RCVD: CPT | Performed by: INTERNAL MEDICINE

## 2018-12-13 PROCEDURE — 99214 OFFICE O/P EST MOD 30 MIN: CPT | Performed by: INTERNAL MEDICINE

## 2018-12-13 PROCEDURE — 36415 COLL VENOUS BLD VENIPUNCTURE: CPT

## 2018-12-13 PROCEDURE — 84439 ASSAY OF FREE THYROXINE: CPT

## 2018-12-13 PROCEDURE — 77085 DXA BONE DENSITY AXL VRT FX: CPT

## 2018-12-13 PROCEDURE — 1036F TOBACCO NON-USER: CPT | Performed by: INTERNAL MEDICINE

## 2018-12-13 RX ORDER — LEVOTHYROXINE SODIUM 0.05 MG/1
50 TABLET ORAL DAILY
COMMUNITY
End: 2019-01-10 | Stop reason: SDUPTHER

## 2018-12-13 ASSESSMENT — PATIENT HEALTH QUESTIONNAIRE - PHQ9
SUM OF ALL RESPONSES TO PHQ QUESTIONS 1-9: 0
SUM OF ALL RESPONSES TO PHQ QUESTIONS 1-9: 0
SUM OF ALL RESPONSES TO PHQ9 QUESTIONS 1 & 2: 0
1. LITTLE INTEREST OR PLEASURE IN DOING THINGS: 0
2. FEELING DOWN, DEPRESSED OR HOPELESS: 0

## 2018-12-13 ASSESSMENT — ENCOUNTER SYMPTOMS
DIARRHEA: 0
EYE PAIN: 0
NAUSEA: 0
VOMITING: 0
BACK PAIN: 0
BLOOD IN STOOL: 0
COUGH: 0
CONSTIPATION: 0
SHORTNESS OF BREATH: 0
ABDOMINAL PAIN: 0

## 2018-12-13 NOTE — PROGRESS NOTES
insufficiency     (Mild)      Past Surgical History:   Procedure Laterality Date    ABDOMINAL EXPLORATION SURGERY  04-    (Dr. Chapo Partida) - With sigmoid colectomy and left colostomy with a Sukhwinder's procedure.  CATARACT REMOVAL     Roland Morrison, LAPAROSCOPIC  13-    COLONOSCOPY  06-    (Dr. Chapo Partida) Status post Sukhwinder's procedure.  COLPOSCOPY  Remote    (for YISSEL-1).  CYSTOSCOPY  08-    (Dr. Chapo Partida) - Bilateral ureteral catheters.  DILATION AND CURETTAGE OF UTERUS  20-    (for post-menopausal bleeding).  DILATION AND CURETTAGE OF UTERUS  85-    (for menometrrohagia).  OTHER SURGICAL HISTORY  02-    YAG laser capsulotomy - right eye. (Dr. Jeff Llamas).  OTHER SURGICAL HISTORY  08-    (Dr. Chapo Partida) - Laparoscopic colostomy takedown with lysis of adhesions and colorectal anastomosis using the EEA-25 staple. (Also, preoperative bilateral lighted stent insertion per Dr. Reginald Quinn).  SMALL INTESTINE SURGERY  08-    (Dr. Chapo Partida).  UMBILICAL HERNIA REPAIR  80-    YAG CAPSULOTOMY         Family History   Problem Relation Age of Onset    Cancer Mother         (unknown type)    Cancer Sister         (mediastinal cancer - unknown type).  Cancer Brother         (Stomach).  Cervical Cancer Neg Hx     Glaucoma Neg Hx        Social History   Substance Use Topics    Smoking status: Never Smoker    Smokeless tobacco: Never Used    Alcohol use No      Current Outpatient Prescriptions   Medication Sig Dispense Refill    levothyroxine (SYNTHROID) 25 MCG tablet TAKE 1 TABLET BY MOUTH DAILY 90 tablet 3    Misc. [address] [facility] bed      DX: cervical fracture with delayed healing 1 Device 0    simvastatin (ZOCOR) 10 MG tablet TAKE 1 TABLET BY MOUTH EVERY EVENING 90 tablet 3    fluticasone (FLONASE) 50 MCG/ACT nasal spray 1 spray each nostril daily. 3 Bottle 3    Misc.  Devices (BATH BENCH WITH BACK) MISC 1 Device by Does not apply route 2 times daily 1 each 0    alendronate (FOSAMAX) 70 MG tablet TAKE 1 TABLET BY MOUTH EVERY 7 DAYS AS DIRECTED 12 tablet 3    PARoxetine (PAXIL) 20 MG tablet TAKE 1 TABLET BY MOUTH DAILY 90 tablet 3    acetaminophen (TYLENOL) 325 MG tablet Take 2 tablets by mouth every 4 hours as needed for Pain or Fever 120 tablet 0    docusate (COLACE, DULCOLAX) 100 MG CAPS Take 100 mg by mouth 2 times daily 30 capsule 0    cloNIDine (CATAPRES) 0.2 MG tablet TAKE 1 TABLET BY MOUTH TWICE DAILY 180 tablet 3    calcium carbonate-vitamin D (CALTRATE 600+D) 600-400 MG-UNIT TABS per tab Take 1 tablet by mouth 2 times daily.  Biotin 5000 MCG TABS Take 5,000 mcg by mouth daily.  Multiple Vitamins-Minerals (MULTIVITAMIN PO) Take 1 tablet by mouth daily.  aspirin 81 MG tablet Take 81 mg by mouth daily. No current facility-administered medications for this visit. Allergies   Allergen Reactions    Bactrim [Sulfamethoxazole-Trimethoprim]     Flagyl [Metronidazole]        Health Maintenance   Topic Date Due    DTaP/Tdap/Td vaccine (1 - Tdap) 09/23/1951    Shingles Vaccine (1 of 2 - 2 Dose Series) 12/13/2019 (Originally 9/16/2012)    Flu vaccine  Completed    Pneumococcal low/med risk  Completed       Subjective:      Review of Systems   Constitutional: Negative for chills and fever. HENT: Negative for hearing loss. Eyes: Negative for pain and visual disturbance. Respiratory: Negative for cough and shortness of breath. Cardiovascular: Negative for chest pain, palpitations and leg swelling. Gastrointestinal: Negative for abdominal pain, blood in stool, constipation, diarrhea, nausea and vomiting. Endocrine: Negative for cold intolerance, polydipsia and polyuria. Genitourinary: Negative for difficulty urinating, dysuria and hematuria. Musculoskeletal: Negative for arthralgias, back pain, gait problem and neck pain. Skin: Negative for pallor and rash.    Neurological: Negative for health maintenance. Instructed to continue currentmedications, diet and exercise. Patient agreed with treatment plan. Follow up asdirected.      Electronically signed by Josef Sow MD on 12/13/2018 at 1:36 PM

## 2018-12-14 ENCOUNTER — TELEPHONE (OUTPATIENT)
Dept: INTERNAL MEDICINE | Age: 83
End: 2018-12-14

## 2019-01-10 DIAGNOSIS — E03.9 HYPOTHYROIDISM, UNSPECIFIED TYPE: Primary | ICD-10-CM

## 2019-01-10 RX ORDER — LEVOTHYROXINE SODIUM 0.03 MG/1
TABLET ORAL
Qty: 30 TABLET | Refills: 0 | OUTPATIENT
Start: 2019-01-10

## 2019-01-11 RX ORDER — LEVOTHYROXINE SODIUM 0.05 MG/1
50 TABLET ORAL DAILY
Qty: 90 TABLET | Refills: 3 | Status: SHIPPED | OUTPATIENT
Start: 2019-01-11 | End: 2019-01-14 | Stop reason: SDUPTHER

## 2019-01-14 DIAGNOSIS — E03.9 HYPOTHYROIDISM, UNSPECIFIED TYPE: ICD-10-CM

## 2019-01-14 RX ORDER — LEVOTHYROXINE SODIUM 0.05 MG/1
50 TABLET ORAL DAILY
Qty: 90 TABLET | Refills: 3 | Status: SHIPPED | OUTPATIENT
Start: 2019-01-14 | End: 2020-01-09

## 2019-01-14 RX ORDER — LEVOTHYROXINE SODIUM 0.03 MG/1
TABLET ORAL
Qty: 30 TABLET | Refills: 0 | OUTPATIENT
Start: 2019-01-14

## 2019-02-22 ENCOUNTER — TELEPHONE (OUTPATIENT)
Dept: INTERNAL MEDICINE | Age: 84
End: 2019-02-22

## 2019-02-22 DIAGNOSIS — E78.5 HYPERLIPIDEMIA, UNSPECIFIED HYPERLIPIDEMIA TYPE: ICD-10-CM

## 2019-02-22 RX ORDER — SIMVASTATIN 10 MG
TABLET ORAL
Qty: 90 TABLET | Refills: 3 | Status: SHIPPED | OUTPATIENT
Start: 2019-02-22 | End: 2020-02-10

## 2019-03-04 ENCOUNTER — HOSPITAL ENCOUNTER (OUTPATIENT)
Dept: LAB | Age: 84
Discharge: HOME | End: 2019-03-04
Payer: MEDICARE

## 2019-03-04 DIAGNOSIS — E03.9 HYPOTHYROIDISM, UNSPECIFIED TYPE: ICD-10-CM

## 2019-03-04 LAB
THYROXINE, FREE: 1.02 NG/DL (ref 0.93–1.7)
TSH SERPL DL<=0.05 MIU/L-ACNC: 1.04 MIU/L (ref 0.3–5)

## 2019-03-04 PROCEDURE — 36415 COLL VENOUS BLD VENIPUNCTURE: CPT

## 2019-03-04 PROCEDURE — 84439 ASSAY OF FREE THYROXINE: CPT

## 2019-03-04 PROCEDURE — 84443 ASSAY THYROID STIM HORMONE: CPT

## 2019-03-13 ENCOUNTER — OFFICE VISIT (OUTPATIENT)
Dept: INTERNAL MEDICINE | Age: 84
End: 2019-03-13
Payer: MEDICARE

## 2019-03-13 VITALS
HEIGHT: 65 IN | RESPIRATION RATE: 14 BRPM | BODY MASS INDEX: 18.49 KG/M2 | DIASTOLIC BLOOD PRESSURE: 76 MMHG | SYSTOLIC BLOOD PRESSURE: 118 MMHG | HEART RATE: 66 BPM | WEIGHT: 111 LBS

## 2019-03-13 DIAGNOSIS — E78.2 MIXED HYPERLIPIDEMIA: ICD-10-CM

## 2019-03-13 DIAGNOSIS — I10 ESSENTIAL HYPERTENSION: Primary | ICD-10-CM

## 2019-03-13 DIAGNOSIS — E03.9 HYPOTHYROIDISM, UNSPECIFIED TYPE: ICD-10-CM

## 2019-03-13 PROCEDURE — 0518F FALL PLAN OF CARE DOCD: CPT | Performed by: INTERNAL MEDICINE

## 2019-03-13 PROCEDURE — G8482 FLU IMMUNIZE ORDER/ADMIN: HCPCS | Performed by: INTERNAL MEDICINE

## 2019-03-13 PROCEDURE — 1036F TOBACCO NON-USER: CPT | Performed by: INTERNAL MEDICINE

## 2019-03-13 PROCEDURE — 1123F ACP DISCUSS/DSCN MKR DOCD: CPT | Performed by: INTERNAL MEDICINE

## 2019-03-13 PROCEDURE — 99214 OFFICE O/P EST MOD 30 MIN: CPT | Performed by: INTERNAL MEDICINE

## 2019-03-13 PROCEDURE — 4040F PNEUMOC VAC/ADMIN/RCVD: CPT | Performed by: INTERNAL MEDICINE

## 2019-03-13 PROCEDURE — G8427 DOCREV CUR MEDS BY ELIG CLIN: HCPCS | Performed by: INTERNAL MEDICINE

## 2019-03-13 PROCEDURE — 1090F PRES/ABSN URINE INCON ASSESS: CPT | Performed by: INTERNAL MEDICINE

## 2019-03-13 PROCEDURE — 3288F FALL RISK ASSESSMENT DOCD: CPT | Performed by: INTERNAL MEDICINE

## 2019-03-13 PROCEDURE — 1100F PTFALLS ASSESS-DOCD GE2>/YR: CPT | Performed by: INTERNAL MEDICINE

## 2019-03-13 PROCEDURE — G8419 CALC BMI OUT NRM PARAM NOF/U: HCPCS | Performed by: INTERNAL MEDICINE

## 2019-03-13 ASSESSMENT — ENCOUNTER SYMPTOMS
BLOOD IN STOOL: 0
EYE PAIN: 0
VOMITING: 0
COUGH: 0
NAUSEA: 0
ABDOMINAL PAIN: 0
BACK PAIN: 0
CONSTIPATION: 0
DIARRHEA: 0
SHORTNESS OF BREATH: 0

## 2019-03-13 ASSESSMENT — PATIENT HEALTH QUESTIONNAIRE - PHQ9
SUM OF ALL RESPONSES TO PHQ9 QUESTIONS 1 & 2: 0
SUM OF ALL RESPONSES TO PHQ QUESTIONS 1-9: 0
1. LITTLE INTEREST OR PLEASURE IN DOING THINGS: 0
2. FEELING DOWN, DEPRESSED OR HOPELESS: 0
SUM OF ALL RESPONSES TO PHQ QUESTIONS 1-9: 0

## 2019-04-08 RX ORDER — CLONIDINE HYDROCHLORIDE 0.2 MG/1
TABLET ORAL
Qty: 180 TABLET | Refills: 3 | Status: SHIPPED | OUTPATIENT
Start: 2019-04-08 | End: 2020-04-01

## 2019-07-19 DIAGNOSIS — F41.1 ANXIETY NEUROSIS: ICD-10-CM

## 2019-07-19 RX ORDER — PAROXETINE HYDROCHLORIDE 20 MG/1
TABLET, FILM COATED ORAL
Qty: 90 TABLET | Refills: 3 | Status: SHIPPED | OUTPATIENT
Start: 2019-07-19 | End: 2019-09-16 | Stop reason: DRUGHIGH

## 2019-09-09 ENCOUNTER — HOSPITAL ENCOUNTER (OUTPATIENT)
Dept: LAB | Age: 84
Discharge: HOME | End: 2019-09-09
Payer: MEDICARE

## 2019-09-09 DIAGNOSIS — E03.9 HYPOTHYROIDISM, UNSPECIFIED TYPE: ICD-10-CM

## 2019-09-09 DIAGNOSIS — I10 ESSENTIAL HYPERTENSION: ICD-10-CM

## 2019-09-09 DIAGNOSIS — E78.2 MIXED HYPERLIPIDEMIA: ICD-10-CM

## 2019-09-09 LAB
ABSOLUTE EOS #: 0.18 K/UL (ref 0–0.4)
ABSOLUTE IMMATURE GRANULOCYTE: ABNORMAL K/UL (ref 0–0.3)
ABSOLUTE LYMPH #: 0.97 K/UL (ref 1–4.8)
ABSOLUTE MONO #: 0.35 K/UL (ref 0.1–1.2)
ALBUMIN SERPL-MCNC: 4.3 G/DL (ref 3.5–5.2)
ALBUMIN/GLOBULIN RATIO: 1 (ref 1–2.5)
ALP BLD-CCNC: 74 U/L (ref 35–104)
ALT SERPL-CCNC: 11 U/L (ref 5–33)
ANION GAP SERPL CALCULATED.3IONS-SCNC: 8 MMOL/L (ref 9–17)
AST SERPL-CCNC: 25 U/L
BASOPHILS # BLD: 1 % (ref 0–1)
BASOPHILS ABSOLUTE: 0.04 K/UL (ref 0–0.2)
BILIRUB SERPL-MCNC: 0.48 MG/DL (ref 0.3–1.2)
BUN BLDV-MCNC: 21 MG/DL (ref 8–23)
BUN/CREAT BLD: 25 (ref 9–20)
CALCIUM SERPL-MCNC: 9.9 MG/DL (ref 8.6–10.4)
CHLORIDE BLD-SCNC: 102 MMOL/L (ref 98–107)
CHOLESTEROL/HDL RATIO: 2.1
CHOLESTEROL: 184 MG/DL
CO2: 31 MMOL/L (ref 20–31)
CREAT SERPL-MCNC: 0.85 MG/DL (ref 0.5–0.9)
DIFFERENTIAL TYPE: ABNORMAL
EOSINOPHILS RELATIVE PERCENT: 4 % (ref 1–7)
GFR AFRICAN AMERICAN: >60 ML/MIN
GFR NON-AFRICAN AMERICAN: >60 ML/MIN
GFR SERPL CREATININE-BSD FRML MDRD: ABNORMAL ML/MIN/{1.73_M2}
GFR SERPL CREATININE-BSD FRML MDRD: ABNORMAL ML/MIN/{1.73_M2}
GLUCOSE BLD-MCNC: 104 MG/DL (ref 70–99)
HCT VFR BLD CALC: 40.3 % (ref 36–46)
HDLC SERPL-MCNC: 87 MG/DL
HEMOGLOBIN: 13.3 G/DL (ref 12–16)
IMMATURE GRANULOCYTES: ABNORMAL %
LDL CHOLESTEROL: 74 MG/DL (ref 0–130)
LYMPHOCYTES # BLD: 22 % (ref 16–46)
MCH RBC QN AUTO: 30.8 PG (ref 26–34)
MCHC RBC AUTO-ENTMCNC: 33 G/DL (ref 31–37)
MCV RBC AUTO: 93.3 FL (ref 80–100)
MONOCYTES # BLD: 8 % (ref 4–11)
MORPHOLOGY: ABNORMAL
NRBC AUTOMATED: ABNORMAL PER 100 WBC
PDW BLD-RTO: 13.4 % (ref 11–14.5)
PLATELET # BLD: 156 K/UL (ref 140–450)
PLATELET ESTIMATE: ABNORMAL
PMV BLD AUTO: 10.5 FL (ref 6–12)
POTASSIUM SERPL-SCNC: 4.5 MMOL/L (ref 3.7–5.3)
RBC # BLD: 4.32 M/UL (ref 4–5.2)
RBC # BLD: ABNORMAL 10*6/UL
SEG NEUTROPHILS: 65 % (ref 43–77)
SEGMENTED NEUTROPHILS ABSOLUTE COUNT: 2.86 K/UL (ref 1.8–7.7)
SODIUM BLD-SCNC: 141 MMOL/L (ref 135–144)
TOTAL PROTEIN: 8.4 G/DL (ref 6.4–8.3)
TRIGL SERPL-MCNC: 115 MG/DL
VLDLC SERPL CALC-MCNC: NORMAL MG/DL (ref 1–30)
WBC # BLD: 4.4 K/UL (ref 3.5–11)
WBC # BLD: ABNORMAL 10*3/UL

## 2019-09-09 PROCEDURE — 85025 COMPLETE CBC W/AUTO DIFF WBC: CPT

## 2019-09-09 PROCEDURE — 36415 COLL VENOUS BLD VENIPUNCTURE: CPT

## 2019-09-09 PROCEDURE — 80061 LIPID PANEL: CPT

## 2019-09-09 PROCEDURE — 80053 COMPREHEN METABOLIC PANEL: CPT

## 2019-09-16 ENCOUNTER — OFFICE VISIT (OUTPATIENT)
Dept: INTERNAL MEDICINE | Age: 84
End: 2019-09-16
Payer: MEDICARE

## 2019-09-16 VITALS
DIASTOLIC BLOOD PRESSURE: 76 MMHG | HEIGHT: 65 IN | HEART RATE: 60 BPM | WEIGHT: 108 LBS | RESPIRATION RATE: 16 BRPM | SYSTOLIC BLOOD PRESSURE: 108 MMHG | BODY MASS INDEX: 17.99 KG/M2

## 2019-09-16 DIAGNOSIS — I10 ESSENTIAL HYPERTENSION: ICD-10-CM

## 2019-09-16 DIAGNOSIS — Z00.00 MEDICARE ANNUAL WELLNESS VISIT, SUBSEQUENT: ICD-10-CM

## 2019-09-16 DIAGNOSIS — Z00.00 ROUTINE GENERAL MEDICAL EXAMINATION AT A HEALTH CARE FACILITY: Primary | ICD-10-CM

## 2019-09-16 DIAGNOSIS — Z12.39 ENCOUNTER FOR SCREENING FOR MALIGNANT NEOPLASM OF BREAST: ICD-10-CM

## 2019-09-16 DIAGNOSIS — E03.8 OTHER SPECIFIED HYPOTHYROIDISM: ICD-10-CM

## 2019-09-16 DIAGNOSIS — E78.5 HYPERLIPIDEMIA, UNSPECIFIED HYPERLIPIDEMIA TYPE: ICD-10-CM

## 2019-09-16 DIAGNOSIS — Z12.31 ENCOUNTER FOR SCREENING MAMMOGRAM FOR BREAST CANCER: ICD-10-CM

## 2019-09-16 PROCEDURE — G8427 DOCREV CUR MEDS BY ELIG CLIN: HCPCS | Performed by: INTERNAL MEDICINE

## 2019-09-16 PROCEDURE — 99214 OFFICE O/P EST MOD 30 MIN: CPT | Performed by: INTERNAL MEDICINE

## 2019-09-16 PROCEDURE — G0439 PPPS, SUBSEQ VISIT: HCPCS | Performed by: INTERNAL MEDICINE

## 2019-09-16 PROCEDURE — 3288F FALL RISK ASSESSMENT DOCD: CPT | Performed by: INTERNAL MEDICINE

## 2019-09-16 PROCEDURE — 1123F ACP DISCUSS/DSCN MKR DOCD: CPT | Performed by: INTERNAL MEDICINE

## 2019-09-16 PROCEDURE — 1036F TOBACCO NON-USER: CPT | Performed by: INTERNAL MEDICINE

## 2019-09-16 PROCEDURE — 1090F PRES/ABSN URINE INCON ASSESS: CPT | Performed by: INTERNAL MEDICINE

## 2019-09-16 PROCEDURE — 4040F PNEUMOC VAC/ADMIN/RCVD: CPT | Performed by: INTERNAL MEDICINE

## 2019-09-16 PROCEDURE — G8419 CALC BMI OUT NRM PARAM NOF/U: HCPCS | Performed by: INTERNAL MEDICINE

## 2019-09-16 PROCEDURE — 0518F FALL PLAN OF CARE DOCD: CPT | Performed by: INTERNAL MEDICINE

## 2019-09-16 RX ORDER — PAROXETINE 10 MG/1
10 TABLET, FILM COATED ORAL EVERY MORNING
COMMUNITY
End: 2021-08-09

## 2019-09-16 ASSESSMENT — ENCOUNTER SYMPTOMS
BLOOD IN STOOL: 0
CONSTIPATION: 0
SHORTNESS OF BREATH: 0
DIARRHEA: 0
ABDOMINAL PAIN: 0
EYE PAIN: 0
COUGH: 0
VOMITING: 0
BACK PAIN: 0
NAUSEA: 0

## 2019-09-16 ASSESSMENT — PATIENT HEALTH QUESTIONNAIRE - PHQ9
SUM OF ALL RESPONSES TO PHQ QUESTIONS 1-9: 0
SUM OF ALL RESPONSES TO PHQ QUESTIONS 1-9: 0

## 2019-09-16 ASSESSMENT — LIFESTYLE VARIABLES: HOW OFTEN DO YOU HAVE A DRINK CONTAINING ALCOHOL: 0

## 2019-11-01 ENCOUNTER — IMMUNIZATION (OUTPATIENT)
Dept: LAB | Age: 84
End: 2019-11-01
Payer: MEDICARE

## 2019-11-01 PROCEDURE — 90653 IIV ADJUVANT VACCINE IM: CPT | Performed by: INTERNAL MEDICINE

## 2019-11-01 PROCEDURE — G0008 ADMIN INFLUENZA VIRUS VAC: HCPCS | Performed by: INTERNAL MEDICINE

## 2019-12-28 ENCOUNTER — TELEPHONE (OUTPATIENT)
Dept: MAMMOGRAPHY | Age: 84
End: 2019-12-28

## 2020-01-10 RX ORDER — LEVOTHYROXINE SODIUM 0.05 MG/1
TABLET ORAL
Qty: 90 TABLET | Refills: 3 | Status: SHIPPED | OUTPATIENT
Start: 2020-01-10 | End: 2020-12-29

## 2020-01-15 NOTE — PROGRESS NOTES
Overdue reminder letter mailed to patient
Pseudophakos     (Right eye).  Renal insufficiency     (Mild)      Past Surgical History:   Procedure Laterality Date    ABDOMINAL EXPLORATION SURGERY  04-    (Dr. Nimisha Harvey) - With sigmoid colectomy and left colostomy with a Sukhwinder's procedure.  CATARACT REMOVAL     Abrahamette Barthel, LAPAROSCOPIC  06-    COLONOSCOPY  06-    (Dr. Nimisha Harvey) Status post Sukhwinder's procedure.  COLPOSCOPY  Remote    (for YISSEL-1).  CYSTOSCOPY  08-    (Dr. Nimisha Harvey) - Bilateral ureteral catheters.  DILATION AND CURETTAGE OF UTERUS  73-    (for post-menopausal bleeding).  DILATION AND CURETTAGE OF UTERUS  96-    (for menometrrohagia).  OTHER SURGICAL HISTORY  02-    YAG laser capsulotomy - right eye. (Dr. Jessica Pratt).  OTHER SURGICAL HISTORY  08-    (Dr. Nimisha Harvey) - Laparoscopic colostomy takedown with lysis of adhesions and colorectal anastomosis using the EEA-25 staple. (Also, preoperative bilateral lighted stent insertion per Dr. Eran Gill).  SMALL INTESTINE SURGERY  08-    (Dr. Nimisha Harvey).  UMBILICAL HERNIA REPAIR  09-    YAG CAPSULOTOMY         Family History   Problem Relation Age of Onset    Cancer Mother         (unknown type)    Cancer Sister         (mediastinal cancer - unknown type).  Cancer Brother         (Stomach).  Cervical Cancer Neg Hx     Glaucoma Neg Hx           Social History     Tobacco Use    Smoking status: Never Smoker    Smokeless tobacco: Never Used   Substance Use Topics    Alcohol use: No         Current Outpatient Medications   Medication Sig Dispense Refill    PARoxetine (PAXIL) 10 MG tablet Take 10 mg by mouth every morning      cloNIDine (CATAPRES) 0.2 MG tablet TAKE ONE TABLET BY MOUTH TWICE A  tablet 3    simvastatin (ZOCOR) 10 MG tablet TAKE 1 TABLET BY MOUTH EVERY EVENING 90 tablet 3    levothyroxine (SYNTHROID) 50 MCG tablet Take 1 tablet by mouth Daily 90 tablet 3    Misc.  Emma Collado

## 2020-02-10 RX ORDER — SIMVASTATIN 10 MG
TABLET ORAL
Qty: 90 TABLET | Refills: 3 | Status: SHIPPED | OUTPATIENT
Start: 2020-02-10 | End: 2021-02-04

## 2020-03-06 ENCOUNTER — TELEPHONE (OUTPATIENT)
Dept: INTERNAL MEDICINE | Age: 85
End: 2020-03-06

## 2020-03-11 ENCOUNTER — HOSPITAL ENCOUNTER (OUTPATIENT)
Dept: LAB | Age: 85
Discharge: HOME | End: 2020-03-11
Payer: MEDICARE

## 2020-03-11 LAB
THYROXINE, FREE: 1.06 NG/DL (ref 0.93–1.7)
TSH SERPL DL<=0.05 MIU/L-ACNC: 1.56 MIU/L (ref 0.3–5)
URIC ACID: 5.3 MG/DL (ref 2.4–5.7)

## 2020-03-11 PROCEDURE — 84443 ASSAY THYROID STIM HORMONE: CPT

## 2020-03-11 PROCEDURE — 84439 ASSAY OF FREE THYROXINE: CPT

## 2020-03-11 PROCEDURE — 84550 ASSAY OF BLOOD/URIC ACID: CPT

## 2020-03-11 PROCEDURE — 36415 COLL VENOUS BLD VENIPUNCTURE: CPT

## 2020-04-01 RX ORDER — CLONIDINE HYDROCHLORIDE 0.2 MG/1
TABLET ORAL
Qty: 180 TABLET | Refills: 3 | Status: SHIPPED | OUTPATIENT
Start: 2020-04-01 | End: 2021-03-26

## 2020-07-22 RX ORDER — PAROXETINE HYDROCHLORIDE 20 MG/1
TABLET, FILM COATED ORAL
Qty: 90 TABLET | Refills: 2 | Status: SHIPPED | OUTPATIENT
Start: 2020-07-22 | End: 2021-08-09 | Stop reason: SDUPTHER

## 2020-10-02 ENCOUNTER — IMMUNIZATION (OUTPATIENT)
Dept: LAB | Age: 85
End: 2020-10-02
Payer: MEDICARE

## 2020-10-02 PROCEDURE — 90694 VACC AIIV4 NO PRSRV 0.5ML IM: CPT | Performed by: INTERNAL MEDICINE

## 2020-10-02 PROCEDURE — PBSHW INFLUENZA, QUADV, ADJUVANTED, 65 YRS +, IM, PF, PREFILL SYR, 0.5ML (FLUAD): Performed by: INTERNAL MEDICINE

## 2020-12-29 RX ORDER — LEVOTHYROXINE SODIUM 0.05 MG/1
TABLET ORAL
Qty: 90 TABLET | Refills: 3 | Status: SHIPPED | OUTPATIENT
Start: 2020-12-29

## 2021-01-04 RX ORDER — ZOSTER VACCINE RECOMBINANT, ADJUVANTED 50 MCG/0.5
0.5 KIT INTRAMUSCULAR SEE ADMIN INSTRUCTIONS
Qty: 0.5 ML | Refills: 0 | Status: SHIPPED | OUTPATIENT
Start: 2021-01-04 | End: 2021-07-03

## 2021-01-22 ENCOUNTER — IMMUNIZATION (OUTPATIENT)
Dept: LAB | Age: 86
End: 2021-01-22
Payer: MEDICARE

## 2021-01-22 PROCEDURE — 91301 COVID-19, MODERNA VACCINE 100MCG/0.5ML DOSE: CPT

## 2021-02-04 DIAGNOSIS — E78.5 HYPERLIPIDEMIA, UNSPECIFIED HYPERLIPIDEMIA TYPE: ICD-10-CM

## 2021-02-04 RX ORDER — SIMVASTATIN 10 MG
TABLET ORAL
Qty: 90 TABLET | Refills: 3 | Status: SHIPPED | OUTPATIENT
Start: 2021-02-04

## 2021-02-19 ENCOUNTER — IMMUNIZATION (OUTPATIENT)
Dept: LAB | Age: 86
End: 2021-02-19
Payer: MEDICARE

## 2021-02-19 PROCEDURE — 0012A: CPT

## 2021-02-19 PROCEDURE — 91301 HC SARSCOV2 VAC 100MCG/0.5ML IM: CPT

## 2021-02-19 PROCEDURE — 91301 COVID-19, MODERNA VACCINE 100MCG/0.5ML DOSE: CPT

## 2021-03-12 DIAGNOSIS — Z12.39 ENCOUNTER FOR OTHER SCREENING FOR MALIGNANT NEOPLASM OF BREAST: Primary | ICD-10-CM

## 2021-03-25 ENCOUNTER — HOSPITAL ENCOUNTER (OUTPATIENT)
Dept: MAMMOGRAPHY | Age: 86
Discharge: HOME | End: 2021-03-27
Payer: MEDICARE

## 2021-03-25 DIAGNOSIS — Z12.31 ENCOUNTER FOR SCREENING MAMMOGRAM FOR MALIGNANT NEOPLASM OF BREAST: Primary | ICD-10-CM

## 2021-03-25 DIAGNOSIS — Z12.39 ENCOUNTER FOR OTHER SCREENING FOR MALIGNANT NEOPLASM OF BREAST: ICD-10-CM

## 2021-03-25 PROCEDURE — 77063 BREAST TOMOSYNTHESIS BI: CPT

## 2021-03-26 RX ORDER — CLONIDINE HYDROCHLORIDE 0.2 MG/1
TABLET ORAL
Qty: 180 TABLET | Refills: 2 | Status: ON HOLD | OUTPATIENT
Start: 2021-03-26 | End: 2021-09-16

## 2021-03-26 NOTE — TELEPHONE ENCOUNTER
Pharmacy requesting a refill of the below medication which has been pended for you:     Requested Prescriptions     Pending Prescriptions Disp Refills    cloNIDine (CATAPRES) 0.2 MG tablet [Pharmacy Med Name: cloNIDine HCL 0.2MG TABLET] 180 tablet 2     Sig: TAKE ONE TABLET BY MOUTH TWICE A DAY       Last Appointment Date: 9/16/2019  Next Appointment Date: Visit date not found    Allergies   Allergen Reactions    Bactrim [Sulfamethoxazole-Trimethoprim]     Flagyl [Metronidazole]

## 2021-06-11 ENCOUNTER — APPOINTMENT (OUTPATIENT)
Dept: CT IMAGING | Age: 86
End: 2021-06-11
Payer: MEDICARE

## 2021-06-11 ENCOUNTER — APPOINTMENT (OUTPATIENT)
Dept: GENERAL RADIOLOGY | Age: 86
End: 2021-06-11
Payer: MEDICARE

## 2021-06-11 ENCOUNTER — HOSPITAL ENCOUNTER (EMERGENCY)
Age: 86
Discharge: ANOTHER HOSPITAL | End: 2021-06-11
Attending: EMERGENCY MEDICINE
Payer: MEDICARE

## 2021-06-11 VITALS
DIASTOLIC BLOOD PRESSURE: 81 MMHG | RESPIRATION RATE: 12 BRPM | BODY MASS INDEX: 17.97 KG/M2 | TEMPERATURE: 97.6 F | WEIGHT: 108 LBS | HEART RATE: 89 BPM | SYSTOLIC BLOOD PRESSURE: 149 MMHG | OXYGEN SATURATION: 95 %

## 2021-06-11 DIAGNOSIS — K52.9 COLITIS: ICD-10-CM

## 2021-06-11 DIAGNOSIS — R10.84 GENERALIZED ABDOMINAL PAIN: ICD-10-CM

## 2021-06-11 DIAGNOSIS — R77.8 ELEVATED TROPONIN: Primary | ICD-10-CM

## 2021-06-11 LAB
-: ABNORMAL
ABSOLUTE EOS #: 0 K/UL (ref 0–0.4)
ABSOLUTE IMMATURE GRANULOCYTE: 0 K/UL (ref 0–0.3)
ABSOLUTE LYMPH #: 0.86 K/UL (ref 1–4.8)
ABSOLUTE MONO #: 0.17 K/UL (ref 0.1–1.2)
ALBUMIN SERPL-MCNC: 3.9 G/DL (ref 3.5–5.2)
ALBUMIN/GLOBULIN RATIO: 0.8 (ref 1–2.5)
ALP BLD-CCNC: 87 U/L (ref 35–104)
ALT SERPL-CCNC: <5 U/L (ref 5–33)
AMORPHOUS: ABNORMAL
AMYLASE: 69 U/L (ref 28–100)
ANION GAP SERPL CALCULATED.3IONS-SCNC: 15 MMOL/L (ref 9–17)
AST SERPL-CCNC: 31 U/L
BACTERIA: ABNORMAL
BASOPHILS # BLD: 0 % (ref 0–1)
BASOPHILS ABSOLUTE: 0 K/UL (ref 0–0.2)
BILIRUB SERPL-MCNC: 0.63 MG/DL (ref 0.3–1.2)
BILIRUBIN URINE: NEGATIVE
BUN BLDV-MCNC: 30 MG/DL (ref 8–23)
BUN/CREAT BLD: 32 (ref 9–20)
CALCIUM SERPL-MCNC: 10 MG/DL (ref 8.6–10.4)
CASTS UA: ABNORMAL /LPF (ref 0–2)
CHLORIDE BLD-SCNC: 100 MMOL/L (ref 98–107)
CO2: 24 MMOL/L (ref 20–31)
COLOR: ABNORMAL
COMMENT UA: ABNORMAL
CREAT SERPL-MCNC: 0.95 MG/DL (ref 0.5–0.9)
CRYSTALS, UA: ABNORMAL /HPF
DIFFERENTIAL TYPE: ABNORMAL
EKG ATRIAL RATE: 89 BPM
EKG ATRIAL RATE: 92 BPM
EKG P AXIS: 60 DEGREES
EKG P AXIS: 79 DEGREES
EKG P-R INTERVAL: 176 MS
EKG P-R INTERVAL: 178 MS
EKG Q-T INTERVAL: 396 MS
EKG Q-T INTERVAL: 410 MS
EKG QRS DURATION: 78 MS
EKG QRS DURATION: 86 MS
EKG QTC CALCULATION (BAZETT): 489 MS
EKG QTC CALCULATION (BAZETT): 498 MS
EKG R AXIS: 105 DEGREES
EKG R AXIS: 108 DEGREES
EKG T AXIS: 25 DEGREES
EKG T AXIS: 58 DEGREES
EKG VENTRICULAR RATE: 89 BPM
EKG VENTRICULAR RATE: 92 BPM
EOSINOPHILS RELATIVE PERCENT: 0 % (ref 1–7)
EPITHELIAL CELLS UA: ABNORMAL /HPF (ref 0–5)
GFR AFRICAN AMERICAN: >60 ML/MIN
GFR NON-AFRICAN AMERICAN: 56 ML/MIN
GFR SERPL CREATININE-BSD FRML MDRD: ABNORMAL ML/MIN/{1.73_M2}
GFR SERPL CREATININE-BSD FRML MDRD: ABNORMAL ML/MIN/{1.73_M2}
GLUCOSE BLD-MCNC: 148 MG/DL (ref 70–99)
GLUCOSE URINE: NEGATIVE
HCT VFR BLD CALC: 50.4 % (ref 36.3–47.1)
HEMOGLOBIN: 16.1 G/DL (ref 11.9–15.1)
IMMATURE GRANULOCYTES: 0 %
KETONES, URINE: NEGATIVE
LACTIC ACID: 2.6 MMOL/L (ref 0.5–2.2)
LACTIC ACID: 3.7 MMOL/L (ref 0.5–2.2)
LEUKOCYTE ESTERASE, URINE: NEGATIVE
LIPASE: 25 U/L (ref 13–60)
LYMPHOCYTES # BLD: 5 % (ref 16–46)
MCH RBC QN AUTO: 30.5 PG (ref 25.2–33.5)
MCHC RBC AUTO-ENTMCNC: 31.9 G/DL (ref 25.2–33.5)
MCV RBC AUTO: 95.5 FL (ref 82.6–102.9)
MONOCYTES # BLD: 1 % (ref 4–11)
MORPHOLOGY: ABNORMAL
MORPHOLOGY: ABNORMAL
MUCUS: ABNORMAL
MYOGLOBIN: 190 NG/ML (ref 25–58)
NITRITE, URINE: NEGATIVE
NRBC AUTOMATED: 0 PER 100 WBC
OTHER OBSERVATIONS UA: ABNORMAL
PDW BLD-RTO: 13 % (ref 11.8–14.4)
PH UA: 7 (ref 5–6)
PLATELET # BLD: 142 K/UL (ref 138–453)
PLATELET ESTIMATE: ABNORMAL
PMV BLD AUTO: 12.3 FL (ref 8.1–13.5)
POTASSIUM SERPL-SCNC: 3.9 MMOL/L (ref 3.7–5.3)
PROTEIN UA: ABNORMAL
RBC # BLD: 5.28 M/UL (ref 3.95–5.11)
RBC # BLD: ABNORMAL 10*6/UL
RBC UA: ABNORMAL /HPF (ref 0–4)
RENAL EPITHELIAL, UA: ABNORMAL /HPF
SEG NEUTROPHILS: 94 % (ref 43–77)
SEGMENTED NEUTROPHILS ABSOLUTE COUNT: 16.17 K/UL (ref 1.5–8.1)
SODIUM BLD-SCNC: 139 MMOL/L (ref 135–144)
SPECIFIC GRAVITY UA: 1.01 (ref 1.01–1.02)
TOTAL PROTEIN: 8.6 G/DL (ref 6.4–8.3)
TRICHOMONAS: ABNORMAL
TROPONIN INTERP: ABNORMAL
TROPONIN INTERP: ABNORMAL
TROPONIN T: ABNORMAL NG/ML
TROPONIN T: ABNORMAL NG/ML
TROPONIN, HIGH SENSITIVITY: 73 NG/L (ref 0–14)
TROPONIN, HIGH SENSITIVITY: 83 NG/L (ref 0–14)
TURBIDITY: ABNORMAL
URINE HGB: ABNORMAL
UROBILINOGEN, URINE: NORMAL
WBC # BLD: 17.2 K/UL (ref 3.5–11.3)
WBC # BLD: ABNORMAL 10*3/UL
WBC UA: ABNORMAL /HPF (ref 0–4)
YEAST: ABNORMAL

## 2021-06-11 PROCEDURE — 99285 EMERGENCY DEPT VISIT HI MDM: CPT

## 2021-06-11 PROCEDURE — 83605 ASSAY OF LACTIC ACID: CPT

## 2021-06-11 PROCEDURE — 2709999900 CT ABDOMEN PELVIS W IV CONTRAST

## 2021-06-11 PROCEDURE — 80053 COMPREHEN METABOLIC PANEL: CPT

## 2021-06-11 PROCEDURE — 83874 ASSAY OF MYOGLOBIN: CPT

## 2021-06-11 PROCEDURE — 96375 TX/PRO/DX INJ NEW DRUG ADDON: CPT

## 2021-06-11 PROCEDURE — 96374 THER/PROPH/DIAG INJ IV PUSH: CPT

## 2021-06-11 PROCEDURE — 82150 ASSAY OF AMYLASE: CPT

## 2021-06-11 PROCEDURE — 81001 URINALYSIS AUTO W/SCOPE: CPT

## 2021-06-11 PROCEDURE — 83690 ASSAY OF LIPASE: CPT

## 2021-06-11 PROCEDURE — 2580000003 HC RX 258: Performed by: EMERGENCY MEDICINE

## 2021-06-11 PROCEDURE — 71045 X-RAY EXAM CHEST 1 VIEW: CPT

## 2021-06-11 PROCEDURE — 36415 COLL VENOUS BLD VENIPUNCTURE: CPT

## 2021-06-11 PROCEDURE — 93005 ELECTROCARDIOGRAM TRACING: CPT | Performed by: EMERGENCY MEDICINE

## 2021-06-11 PROCEDURE — 6360000002 HC RX W HCPCS: Performed by: EMERGENCY MEDICINE

## 2021-06-11 PROCEDURE — 85025 COMPLETE CBC W/AUTO DIFF WBC: CPT

## 2021-06-11 PROCEDURE — 84484 ASSAY OF TROPONIN QUANT: CPT

## 2021-06-11 PROCEDURE — 6360000004 HC RX CONTRAST MEDICATION: Performed by: EMERGENCY MEDICINE

## 2021-06-11 RX ORDER — 0.9 % SODIUM CHLORIDE 0.9 %
1000 INTRAVENOUS SOLUTION INTRAVENOUS ONCE
Status: COMPLETED | OUTPATIENT
Start: 2021-06-11 | End: 2021-06-11

## 2021-06-11 RX ORDER — PROMETHAZINE HYDROCHLORIDE 25 MG/ML
12.5 INJECTION, SOLUTION INTRAMUSCULAR; INTRAVENOUS ONCE
Status: COMPLETED | OUTPATIENT
Start: 2021-06-11 | End: 2021-06-11

## 2021-06-11 RX ORDER — 0.9 % SODIUM CHLORIDE 0.9 %
1000 INTRAVENOUS SOLUTION INTRAVENOUS ONCE
Status: DISCONTINUED | OUTPATIENT
Start: 2021-06-11 | End: 2021-06-11 | Stop reason: HOSPADM

## 2021-06-11 RX ORDER — ONDANSETRON 2 MG/ML
4 INJECTION INTRAMUSCULAR; INTRAVENOUS ONCE
Status: COMPLETED | OUTPATIENT
Start: 2021-06-11 | End: 2021-06-11

## 2021-06-11 RX ADMIN — IOPAMIDOL 100 ML: 755 INJECTION, SOLUTION INTRAVENOUS at 13:53

## 2021-06-11 RX ADMIN — ONDANSETRON 4 MG: 2 INJECTION INTRAMUSCULAR; INTRAVENOUS at 12:25

## 2021-06-11 RX ADMIN — PROMETHAZINE HYDROCHLORIDE 12.5 MG: 25 INJECTION INTRAMUSCULAR; INTRAVENOUS at 13:27

## 2021-06-11 RX ADMIN — SODIUM CHLORIDE 1000 ML: 9 INJECTION, SOLUTION INTRAVENOUS at 12:25

## 2021-06-11 NOTE — ED NOTES
Pt and family states they do not wish to go to [facility] for any services. Request transfer to [facility] if they have Cardiology available over the weekend. I spoke with the transfer dept at [facility] and they states they do not have cardiology coverage over this weekend. Will contact [address] per pt and family request for possible transfer.      Mikey Boyd RN  06/11/21 1314

## 2021-06-11 NOTE — ED PROVIDER NOTES
[facility]  eMERGENCY dEPARTMENT eNCOUnter      Pt Name: Viky Dietz  MRN: [id number]  [place] 9/23/1932  Date of evaluation: 6/11/2021      CHIEF COMPLAINT       Chief Complaint   Patient presents with    Nausea & Vomiting         HISTORY OF PRESENT ILLNESS    Viky Dietz is a 80 y.o. female who presents with chief complaint of nausea vomiting patient states that she has been in fairly good health until last night when she developed abdominal discomfort nausea vomiting she is unable to keep anything down there is been no diarrhea no fevers or chills or cough. She has not seen a physician since 2019 as she has been avoiding them secondary to the Covid but has been getting refills of her medication. She has had a history of multiple abdominal surgeries    REVIEW OF SYSTEMS         Review of Systems   Constitutional: Negative for chills and fever. HENT: Negative for congestion and ear pain. Eyes: Negative for pain and visual disturbance. Respiratory: Negative for cough and shortness of breath. Cardiovascular: Negative for chest pain, palpitations and leg swelling. Gastrointestinal: Positive for abdominal pain, nausea and vomiting. Negative for blood in stool, constipation and diarrhea. Endocrine: Negative for polydipsia and polyuria. Genitourinary: Negative for difficulty urinating, dysuria and frequency. Musculoskeletal: Negative for back pain, joint swelling, myalgias, neck pain and neck stiffness. Skin: Negative for rash. Neurological: Negative for dizziness, weakness and headaches. Hematological: Negative for adenopathy. Does not bruise/bleed easily. Psychiatric/Behavioral: Negative for confusion, self-injury and suicidal ideas. 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     SURGICAL DULCOLAX) 100 MG CAPS Take 100 mg by mouth 2 times daily, Disp-30 capsule, R-0OTC      calcium carbonate-vitamin D (CALTRATE 600+D) 600-400 MG-UNIT TABS per tab Take 1 tablet by mouth 2 times daily. Biotin 5000 MCG TABS Take 5,000 mcg by mouth daily. Multiple Vitamins-Minerals (MULTIVITAMIN PO) Take 1 tablet by mouth daily. aspirin 81 MG tablet Take 81 mg by mouth daily. !! - Potential duplicate medications found. Please discuss with provider. ALLERGIES     is allergic to bactrim [sulfamethoxazole-trimethoprim] and flagyl [metronidazole]. FAMILY HISTORY     She indicated that the status of her mother is unknown. She indicated that the status of her sister is unknown. She indicated that the status of her brother is unknown. She indicated that the status of her neg hx is unknown.     family history includes Cancer in her brother, mother, and sister. SOCIAL HISTORY      reports that she has never smoked. She has never used smokeless tobacco. She reports that she does not drink alcohol and does not use drugs. PHYSICAL EXAM     INITIAL VITALS:  weight is 108 lb (49 kg). Her tympanic temperature is 97.6 °F (36.4 °C). Her blood pressure is 149/81 (abnormal) and her pulse is 89. Her respiration is 12 and oxygen saturation is 95%. Physical Exam  Constitutional:       Appearance: She is well-developed and normal weight. She is ill-appearing. Comments: Patient who appears ill very nauseated holding an emesis bag   HENT:      Head: Normocephalic and atraumatic. Right Ear: External ear normal.      Left Ear: External ear normal.   Eyes:      Conjunctiva/sclera: Conjunctivae normal.      Pupils: Pupils are equal, round, and reactive to light. Cardiovascular:      Rate and Rhythm: Normal rate and regular rhythm. Pulmonary:      Effort: Pulmonary effort is normal.      Breath sounds: Normal breath sounds.    Abdominal:      General: Bowel sounds are normal. There is no distension. Palpations: Abdomen is soft. Tenderness: There is abdominal tenderness. Comments: Soft but diffusely tender abdomen   Musculoskeletal:         General: No tenderness. Normal range of motion. Cervical back: Normal range of motion. Right lower leg: No edema. Left lower leg: No edema. Skin:     General: Skin is warm and dry. Neurological:      General: No focal deficit present. Mental Status: She is alert and oriented to person, place, and time. Psychiatric:         Behavior: Behavior normal.           DIFFERENTIAL DIAGNOSIS/ MDM:     Abdominal pain with nausea vomiting we will do a work-up including EKG and cardiac enzymes    DIAGNOSTIC RESULTS     EKG: All EKG's are interpreted by the Emergency Department Physician who either signs or Co-signs this chart in the absence of a cardiologist.  Normal sinus rhythm rate of 92 bpm MA interval is 178 ms QRS durations 86 ms QT corrected 489 ms axis is 105 there is some mild lateral ST changes which represent ischemia no definite elevation seen      RADIOLOGY:   I directly visualized the following  images and reviewed the radiologist interpretations:       CT OF THE ABDOMEN AND PELVIS WITH CONTRAST 6/11/2021 1:52 pm       TECHNIQUE:   CT of the abdomen and pelvis was performed with the administration of   intravenous contrast. Multiplanar reformatted images are provided for review.    Dose modulation, iterative reconstruction, and/or weight based adjustment of   the mA/kV was utilized to reduce the radiation dose to as low as reasonably   achievable.       COMPARISON:   [place] 15, 2010       HISTORY:   ORDERING SYSTEM PROVIDED HISTORY: Abdominal pain with nausea vomiting   TECHNOLOGIST PROVIDED HISTORY:       Abdominal pain with nausea vomiting   Decision Support Exception - unselect if not a suspected or confirmed   emergency medical condition->Emergency Medical Condition (MA)   Reason for Exam: Mid abd pain, hx cholecystectomy, colon resection with   colostomy and reversal   Acuity: Acute   Type of Exam: Initial       FINDINGS:   Lower Chest: Bibasilar dependent atelectasis.  No pleural effusion.       Organs: Gallbladder is absent.  No focal hepatic lesion.  Spleen, pancreas,   adrenal glands, and kidneys are unremarkable.  Kidneys enhance symmetrically. No hydronephrosis.  No perinephric stranding.  Small volume of ascites in the   right upper quadrant of the abdomen.       GI/Bowel: Multiple circumferential thickened loops of small bowel most   notable in the right lower quadrant.  No evidence of pneumatosis intestinalis   or portal venous gas.  Moderate volume of stool in the colon.  No evidence of   bowel obstruction or free intraperitoneal air.  Surgical sutures are seen in   the bowel in the right hemipelvis from prior surgery.       Pelvis: Small to moderate volume of free fluid in the pelvis and along the   mesentery.  There is a focal lobulation along the left posterior wall of the   urinary bladder.  Based on the phase of intravenous contrast, this is   unlikely to be excreted contrast.       Peritoneum/Retroperitoneum: Abdominal aorta is atherosclerotic.  No   retroperitoneal adenopathy.       Bones/Soft Tissues: Bones are osteopenic.  Mild to moderate degenerative disc   disease at L4-L5 and L5-S1 with chronic grade 1 listhesis of L4 on L5 and L5   on S1 (comparison made to  image of prior CT of the abdomen and pelvis   dated [place] 15, 2010).         Impression   1. Multiple circumferentially thickened loops of small bowel in the right   lower quadrant and right hemipelvis suggestive of an enteritis. 2. Small to moderate free fluid in the pelvis, lungs in the mesentery, and in   the right upper quadrant of the abdomen.    3. Incidental finding of a 1 cm hyperdensity along the left posterior urinary   bladder wall.  Consider further evaluation with cystoscopy, particularly if   patient has clinical symptoms of hematuria.            EXAMINATION:   ONE XRAY VIEW OF THE CHEST       6/11/2021 2:18 pm       COMPARISON:   10/07/2011       HISTORY:   ORDERING SYSTEM PROVIDED HISTORY: Weakness   TECHNOLOGIST PROVIDED HISTORY:   Weakness   Reason for Exam: Weakness   Acuity: Acute   Type of Exam: Initial       FINDINGS:   Mild cardiomegaly.  Cardiomediastinal silhouette otherwise within normal   limits.  Minimal bibasilar atelectasis or possibly scarring.  No pneumothorax   or subdiaphragmatic free air.  Hyperinflation redemonstrated.  No acute   osseous abnormality.           Impression   Minimal bibasilar atelectasis or scarring.  Otherwise, no acute abnormality   identified.       Hyperinflation suggesting underlying COPD.               ED BEDSIDE ULTRASOUND:       LABS:  Labs Reviewed   CBC WITH AUTO DIFFERENTIAL - Abnormal; Notable for the following components:       Result Value    WBC 17.2 (*)     RBC 5.28 (*)     Hemoglobin 16.1 (*)     Hematocrit 50.4 (*)     Monocytes 1 (*)     Lymphocytes 5 (*)     Seg Neutrophils 94 (*)     Eosinophils % 0 (*)     Absolute Lymph # 0.86 (*)     Segs Absolute 16.17 (*)     All other components within normal limits   COMPREHENSIVE METABOLIC PANEL - Abnormal; Notable for the following components:    Glucose 148 (*)     BUN 30 (*)     CREATININE 0.95 (*)     Bun/Cre Ratio 32 (*)     ALT <5 (*)     Total Protein 8.6 (*)     Albumin/Globulin Ratio 0.8 (*)     GFR Non- 56 (*)     All other components within normal limits   LACTIC ACID - Abnormal; Notable for the following components:    Lactic Acid 3.7 (*)     All other components within normal limits   MYOGLOBIN, SERUM - Abnormal; Notable for the following components:    Myoglobin 190 (*)     All other components within normal limits   TROPONIN - Abnormal; Notable for the following components:    Troponin, High Sensitivity 83 (*)     All other components within normal limits   URINE RT REFLEX TO CULTURE - Abnormal; Notable for the following components:    Urine Hgb TRACE (*)     pH, UA 7.0 (*)     Protein, UA 1+ (*)     All other components within normal limits   TROPONIN - Abnormal; Notable for the following components:    Troponin, High Sensitivity 73 (*)     All other components within normal limits   MICROSCOPIC URINALYSIS - Abnormal; Notable for the following components:    Bacteria, UA TRACE (*)     All other components within normal limits   LACTIC ACID - Abnormal; Notable for the following components:    Lactic Acid 2.6 (*)     All other components within normal limits   AMYLASE   LIPASE           EMERGENCY DEPARTMENT COURSE:   Vitals:    Vitals:    06/11/21 1330 06/11/21 1557 06/11/21 1600 06/11/21 1630   BP: (!) 187/105 (!) 149/101 (!) 158/96 (!) 149/81   Pulse: 94 89     Resp: 14 12     Temp:       TempSrc:       SpO2: 97% 95% 93% 95%   Weight:         -------------------------  BP: (!) 149/81, Temp: 97.6 °F (36.4 °C), Pulse: 89, Resp: 12        Re-evaluation Notes    Patient is feeling much better abdomen has only has minimal tenderness to my exam  EKG shows no signs of change normal sinus rhythm rate of 92 bpm TX interval is 178 ms QS durations 86 ms QT corrected is 489 ms R axis is 105 there is no acute ST elevation she does have some nonspecific lateral ST changes  CRITICAL CARE:   None        CONSULTS: Audiology was consulted I discussed the case with Dr. Hemant Lopez given of the elevation in her troponins he requested she be transferred as we do not have cardiology coverage over the weekend. I then discussed the case with the family they reviewed would prefer to go to [facility] to be seen by Dr. Craig Liz possible, I discussed the case with the cardiologist at [facility] and he was agreeable except the patient in transfer      PROCEDURES:  None    FINAL IMPRESSION      1. Elevated troponin    2. Generalized abdominal pain    3.  Colitis          DISPOSITION/PLAN   DISPOSITION transferred to [facility] under the care of cardiology     Condition on Disposition    Stable    PATIENT REFERRED TO:  No follow-up provider specified. DISCHARGE MEDICATIONS:  Discharge Medication List as of 6/11/2021  5:40 PM          (Please note that portions of this note were completed with a voice recognition program.  Efforts were made to edit the dictations but occasionally words are mis-transcribed.)    Sofia Dasilva MD,, MD, F.A.A.E.M.   Attending Emergency Physician                          Sofia Dasilva MD  06/11/21 [id number]       Sofia Dasilva MD  06/16/21 [id number]

## 2021-06-16 ASSESSMENT — ENCOUNTER SYMPTOMS
VOMITING: 1
SHORTNESS OF BREATH: 0
EYE PAIN: 0
NAUSEA: 1
BACK PAIN: 0
ABDOMINAL PAIN: 1
COUGH: 0
CONSTIPATION: 0
DIARRHEA: 0
BLOOD IN STOOL: 0

## 2021-07-04 ENCOUNTER — PATIENT MESSAGE (OUTPATIENT)
Dept: INTERNAL MEDICINE | Age: 86
End: 2021-07-04

## 2021-07-04 DIAGNOSIS — M81.0 OSTEOPOROSIS, UNSPECIFIED OSTEOPOROSIS TYPE, UNSPECIFIED PATHOLOGICAL FRACTURE PRESENCE: Primary | ICD-10-CM

## 2021-07-06 ENCOUNTER — TELEPHONE (OUTPATIENT)
Dept: INTERNAL MEDICINE | Age: 86
End: 2021-07-06

## 2021-07-06 NOTE — TELEPHONE ENCOUNTER
We sent an rx for rolling walker to [facility].  They need F 2 F note, height, weight and insurance information for patient. Fax info to [phone]  It looks like this request was per a hospital stay so don't know whether discharge instructions would work or not?

## 2021-07-06 NOTE — TELEPHONE ENCOUNTER
From: Bruce Marcos  To: Brenna Hutchinson MD  Sent: 7/4/2021 5:03 PM EDT  Subject: Prescription Question    I was recently in [facility] in [place] for a little over 2-1\2 weeks with a mild heart attack (Dr. North Sellers) and small intestine surgery (Dr. Gia Roche). Please, I need a prescription for a 4-wheel walker, sent to [facility] on [address], in [place].   Thank you,  Daniel Laura

## 2021-07-06 NOTE — TELEPHONE ENCOUNTER
Spoke with the son they said to cancel the order cause they are just purchasing it with out going thru the insurance

## 2021-07-06 NOTE — TELEPHONE ENCOUNTER
[facility] states there was nothiong in hospital note regarding rolling walker. Can Dr do an addendum and rule out a cane and request the walker per hospital note?   If so fax to [facility]  [phone]

## 2021-07-13 RX ORDER — AMLODIPINE BESYLATE 5 MG/1
5 TABLET ORAL DAILY
COMMUNITY

## 2021-08-09 ENCOUNTER — OFFICE VISIT (OUTPATIENT)
Dept: INTERNAL MEDICINE | Age: 86
End: 2021-08-09
Payer: MEDICARE

## 2021-08-09 ENCOUNTER — HOSPITAL ENCOUNTER (OUTPATIENT)
Dept: LAB | Age: 86
Discharge: HOME | End: 2021-08-09
Payer: MEDICARE

## 2021-08-09 VITALS
DIASTOLIC BLOOD PRESSURE: 70 MMHG | HEART RATE: 58 BPM | SYSTOLIC BLOOD PRESSURE: 122 MMHG | RESPIRATION RATE: 16 BRPM | HEIGHT: 65 IN | WEIGHT: 99 LBS | BODY MASS INDEX: 16.5 KG/M2

## 2021-08-09 DIAGNOSIS — F41.9 ANXIETY: ICD-10-CM

## 2021-08-09 DIAGNOSIS — N32.89 BLADDER WALL THICKENING: ICD-10-CM

## 2021-08-09 DIAGNOSIS — I21.4 NON-STEMI (NON-ST ELEVATED MYOCARDIAL INFARCTION) (HCC): ICD-10-CM

## 2021-08-09 DIAGNOSIS — I10 ESSENTIAL HYPERTENSION: ICD-10-CM

## 2021-08-09 DIAGNOSIS — D64.9 ANEMIA, UNSPECIFIED TYPE: ICD-10-CM

## 2021-08-09 DIAGNOSIS — E03.9 ACQUIRED HYPOTHYROIDISM: ICD-10-CM

## 2021-08-09 DIAGNOSIS — Z00.00 GENERAL MEDICAL EXAM: ICD-10-CM

## 2021-08-09 DIAGNOSIS — E78.5 HYPERLIPIDEMIA, UNSPECIFIED HYPERLIPIDEMIA TYPE: ICD-10-CM

## 2021-08-09 DIAGNOSIS — Z00.00 GENERAL MEDICAL EXAM: Primary | ICD-10-CM

## 2021-08-09 DIAGNOSIS — I25.10 CORONARY ARTERY DISEASE DUE TO LIPID RICH PLAQUE: ICD-10-CM

## 2021-08-09 DIAGNOSIS — Z00.00 MEDICARE ANNUAL WELLNESS VISIT, SUBSEQUENT: ICD-10-CM

## 2021-08-09 DIAGNOSIS — Z00.00 ROUTINE GENERAL MEDICAL EXAMINATION AT A HEALTH CARE FACILITY: ICD-10-CM

## 2021-08-09 DIAGNOSIS — I25.83 CORONARY ARTERY DISEASE DUE TO LIPID RICH PLAQUE: ICD-10-CM

## 2021-08-09 LAB
ANION GAP SERPL CALCULATED.3IONS-SCNC: 8 MMOL/L (ref 9–17)
BUN BLDV-MCNC: 20 MG/DL (ref 8–23)
BUN/CREAT BLD: 22 (ref 9–20)
CALCIUM SERPL-MCNC: 9.8 MG/DL (ref 8.6–10.4)
CHLORIDE BLD-SCNC: 96 MMOL/L (ref 98–107)
CO2: 25 MMOL/L (ref 20–31)
CREAT SERPL-MCNC: 0.92 MG/DL (ref 0.5–0.9)
GFR AFRICAN AMERICAN: >60 ML/MIN
GFR NON-AFRICAN AMERICAN: 58 ML/MIN
GFR SERPL CREATININE-BSD FRML MDRD: ABNORMAL ML/MIN/{1.73_M2}
GFR SERPL CREATININE-BSD FRML MDRD: ABNORMAL ML/MIN/{1.73_M2}
GLUCOSE BLD-MCNC: 88 MG/DL (ref 70–99)
HEMOGLOBIN: 11.6 G/DL (ref 11.9–15.1)
POTASSIUM SERPL-SCNC: 4.7 MMOL/L (ref 3.7–5.3)
SODIUM BLD-SCNC: 129 MMOL/L (ref 135–144)
TSH SERPL DL<=0.05 MIU/L-ACNC: 0.85 MIU/L (ref 0.3–5)

## 2021-08-09 PROCEDURE — 85018 HEMOGLOBIN: CPT

## 2021-08-09 PROCEDURE — 1123F ACP DISCUSS/DSCN MKR DOCD: CPT | Performed by: INTERNAL MEDICINE

## 2021-08-09 PROCEDURE — 83550 IRON BINDING TEST: CPT

## 2021-08-09 PROCEDURE — G8427 DOCREV CUR MEDS BY ELIG CLIN: HCPCS | Performed by: INTERNAL MEDICINE

## 2021-08-09 PROCEDURE — 1090F PRES/ABSN URINE INCON ASSESS: CPT | Performed by: INTERNAL MEDICINE

## 2021-08-09 PROCEDURE — G0439 PPPS, SUBSEQ VISIT: HCPCS | Performed by: INTERNAL MEDICINE

## 2021-08-09 PROCEDURE — G8419 CALC BMI OUT NRM PARAM NOF/U: HCPCS | Performed by: INTERNAL MEDICINE

## 2021-08-09 PROCEDURE — 80048 BASIC METABOLIC PNL TOTAL CA: CPT

## 2021-08-09 PROCEDURE — 83540 ASSAY OF IRON: CPT

## 2021-08-09 PROCEDURE — 36415 COLL VENOUS BLD VENIPUNCTURE: CPT

## 2021-08-09 PROCEDURE — 4040F PNEUMOC VAC/ADMIN/RCVD: CPT | Performed by: INTERNAL MEDICINE

## 2021-08-09 PROCEDURE — 99214 OFFICE O/P EST MOD 30 MIN: CPT | Performed by: INTERNAL MEDICINE

## 2021-08-09 PROCEDURE — 84439 ASSAY OF FREE THYROXINE: CPT

## 2021-08-09 PROCEDURE — 84443 ASSAY THYROID STIM HORMONE: CPT

## 2021-08-09 PROCEDURE — 1036F TOBACCO NON-USER: CPT | Performed by: INTERNAL MEDICINE

## 2021-08-09 RX ORDER — FUROSEMIDE 20 MG/1
20 TABLET ORAL DAILY
Status: ON HOLD | COMMUNITY
End: 2021-09-17 | Stop reason: HOSPADM

## 2021-08-09 RX ORDER — PANTOPRAZOLE SODIUM 40 MG/1
40 TABLET, DELAYED RELEASE ORAL DAILY
COMMUNITY

## 2021-08-09 RX ORDER — METOPROLOL SUCCINATE 100 MG/1
100 TABLET, EXTENDED RELEASE ORAL DAILY
Status: ON HOLD | COMMUNITY
End: 2021-09-16

## 2021-08-09 RX ORDER — SPIRONOLACTONE 25 MG/1
25 TABLET ORAL DAILY
Status: ON HOLD | COMMUNITY
End: 2021-09-17 | Stop reason: HOSPADM

## 2021-08-09 RX ORDER — PAROXETINE HYDROCHLORIDE 20 MG/1
TABLET, FILM COATED ORAL
Qty: 90 TABLET | Refills: 3 | Status: ON HOLD | OUTPATIENT
Start: 2021-08-09 | End: 2021-09-17 | Stop reason: SDUPTHER

## 2021-08-09 RX ORDER — METOCLOPRAMIDE 10 MG/1
10 TABLET ORAL
Status: ON HOLD | COMMUNITY
End: 2021-09-16

## 2021-08-09 RX ORDER — FERROUS SULFATE 325(65) MG
325 TABLET ORAL
Status: ON HOLD | COMMUNITY
End: 2021-09-16

## 2021-08-09 SDOH — ECONOMIC STABILITY: FOOD INSECURITY: WITHIN THE PAST 12 MONTHS, YOU WORRIED THAT YOUR FOOD WOULD RUN OUT BEFORE YOU GOT MONEY TO BUY MORE.: NEVER TRUE

## 2021-08-09 SDOH — ECONOMIC STABILITY: FOOD INSECURITY: WITHIN THE PAST 12 MONTHS, THE FOOD YOU BOUGHT JUST DIDN'T LAST AND YOU DIDN'T HAVE MONEY TO GET MORE.: NEVER TRUE

## 2021-08-09 ASSESSMENT — ENCOUNTER SYMPTOMS
VOMITING: 0
SHORTNESS OF BREATH: 0
NAUSEA: 0
EYE PAIN: 0
BACK PAIN: 0
COUGH: 0
DIARRHEA: 0
BLOOD IN STOOL: 0
CONSTIPATION: 0
ABDOMINAL PAIN: 0

## 2021-08-09 ASSESSMENT — PATIENT HEALTH QUESTIONNAIRE - PHQ9
1. LITTLE INTEREST OR PLEASURE IN DOING THINGS: 1
2. FEELING DOWN, DEPRESSED OR HOPELESS: 1
SUM OF ALL RESPONSES TO PHQ QUESTIONS 1-9: 2
SUM OF ALL RESPONSES TO PHQ QUESTIONS 1-9: 2
SUM OF ALL RESPONSES TO PHQ9 QUESTIONS 1 & 2: 2
SUM OF ALL RESPONSES TO PHQ QUESTIONS 1-9: 2

## 2021-08-09 ASSESSMENT — LIFESTYLE VARIABLES: HOW OFTEN DO YOU HAVE A DRINK CONTAINING ALCOHOL: 0

## 2021-08-09 ASSESSMENT — SOCIAL DETERMINANTS OF HEALTH (SDOH): HOW HARD IS IT FOR YOU TO PAY FOR THE VERY BASICS LIKE FOOD, HOUSING, MEDICAL CARE, AND HEATING?: NOT HARD AT ALL

## 2021-08-09 NOTE — PATIENT INSTRUCTIONS
Personalized Preventive Plan for Kenzie Morales - 0/[birth year]10  Medicare offers a range of preventive health benefits. Some of the tests and screenings are paid in full while other may be subject to a deductible, co-insurance, and/or copay. Some of these benefits include a comprehensive review of your medical history including lifestyle, illnesses that may run in your family, and various assessments and screenings as appropriate. After reviewing your medical record and screening and assessments performed today your provider may have ordered immunizations, labs, imaging, and/or referrals for you. A list of these orders (if applicable) as well as your Preventive Care list are included within your After Visit Summary for your review. Other Preventive Recommendations:    · A preventive eye exam performed by an eye specialist is recommended every 1-2 years to screen for glaucoma; cataracts, macular degeneration, and other eye disorders. · A preventive dental visit is recommended every 6 months. · Try to get at least 150 minutes of exercise per week or 10,000 steps per day on a pedometer . · Order or download the FREE \"Exercise & Physical Activity: Your Everyday Guide\" from [employer]. Call 6-552.779.5097 or search [employer] online. · You need 1417-7146 mg of calcium and 2118-6195 IU of vitamin D per day. It is possible to meet your calcium requirement with diet alone, but a vitamin D supplement is usually necessary to meet this goal.  · When exposed to the sun, use a sunscreen that protects against both UVA and UVB radiation with an SPF of 30 or greater. Reapply every 2 to 3 hours or after sweating, drying off with a towel, or swimming. · Always wear a seat belt when traveling in a car. Always wear a helmet when riding a bicycle or motorcycle.

## 2021-08-09 NOTE — PROGRESS NOTES
[place] [address]  Dept: [phone]  Dept Fax: [phone]  Loc: [phone]     Sunny Zendejas is a 80 y.o. female who presents today for her medical conditions/complaintsas noted below. Sunny Zendejas is c/o of   Chief Complaint   Patient presents with   [facility]     3 month, is feeling a lot of anxiousness and stress. was previously on paxil, but has not taken it for some time now and feels that she needs to be on something    Hypertension    Hyperlipidemia    Coronary Artery Disease    Hypothyroidism         HPI:     Hypertension  This is a chronic (essential htn) problem. The current episode started more than 1 year ago. The problem has been waxing and waning since onset. The problem is controlled. Pertinent negatives include no chest pain, headaches, neck pain, palpitations or shortness of breath. Hyperlipidemia  This is a chronic problem. The current episode started more than 1 year ago. The problem is controlled. Recent lipid tests were reviewed and are variable. Pertinent negatives include no chest pain or shortness of breath. Coronary Artery Disease  Presents for follow-up visit. Pertinent negatives include no chest pain, chest pressure, dizziness, leg swelling, palpitations or shortness of breath. Risk factors include hyperlipidemia. The symptoms have been stable. Compliance with diet is good. Compliance with exercise is good. Compliance with medications is good. Other  This is a recurrent (4-General medical exam) problem. The current episode started today. The problem occurs intermittently. The problem has been waxing and waning. Pertinent negatives include no abdominal pain, arthralgias, chest pain, chills, coughing, fever, headaches, nausea, neck pain, numbness, rash, vomiting or weakness.        No results found for: LABA1C         No results found for: Tobacco Use    Smoking status: Never Smoker    Smokeless tobacco: Never Used   Substance Use Topics    Alcohol use: No         Current Outpatient Medications   Medication Sig Dispense Refill    spironolactone (ALDACTONE) 25 MG tablet Take 25 mg by mouth daily      metoprolol succinate (TOPROL XL) 100 MG extended release tablet Take 100 mg by mouth daily      pantoprazole (PROTONIX) 40 MG tablet Take 40 mg by mouth daily      furosemide (LASIX) 20 MG tablet Take 20 mg by mouth 2 times daily Only takes on Tues, Thur and Sat      metoclopramide (REGLAN) 10 MG tablet Take 10 mg by mouth 2 times daily (before meals)      ferrous sulfate (IRON 325) 325 (65 Fe) MG tablet Take 325 mg by mouth 2 times daily (with meals)      PARoxetine (PAXIL) 20 MG tablet TAKE ONE TABLET BY MOUTH DAILY 90 tablet 3    amLODIPine (NORVASC) 5 MG tablet Take 5 mg by mouth daily      Misc. Devices (WALKER) MISC Rolling walker with seat 1 each 0    cloNIDine (CATAPRES) 0.2 MG tablet TAKE ONE TABLET BY MOUTH TWICE A  tablet 2    simvastatin (ZOCOR) 10 MG tablet TAKE ONE TABLET BY MOUTH EVERY EVENING 90 tablet 3    levothyroxine (SYNTHROID) 50 MCG tablet TAKE ONE TABLET BY MOUTH DAILY 90 tablet 3    Misc. [address] [facility] bed      DX: cervical fracture with delayed healing 1 Device 0    fluticasone (FLONASE) 50 MCG/ACT nasal spray 1 spray each nostril daily. 3 Bottle 3    Misc.  Devices (BATH BENCH WITH BACK) MISC 1 Device by Does not apply route 2 times daily 1 each 0    alendronate (FOSAMAX) 70 MG tablet TAKE 1 TABLET BY MOUTH EVERY 7 DAYS AS DIRECTED (Patient not taking: Reported on 9/16/2019) 12 tablet 3    acetaminophen (TYLENOL) 325 MG tablet Take 2 tablets by mouth every 4 hours as needed for Pain or Fever 120 tablet 0    docusate (COLACE, DULCOLAX) 100 MG CAPS Take 100 mg by mouth 2 times daily 30 capsule 0    calcium carbonate-vitamin D (CALTRATE 600+D) 600-400 MG-UNIT TABS per tab Take 1 tablet by mouth 2 times daily.  Biotin 5000 MCG TABS Take 5,000 mcg by mouth daily.  Multiple Vitamins-Minerals (MULTIVITAMIN PO) Take 1 tablet by mouth daily.  aspirin 81 MG tablet Take 81 mg by mouth daily. (Patient not taking: Reported on 8/9/2021)       No current facility-administered medications for this visit. Allergies   Allergen Reactions    Bactrim [Sulfamethoxazole-Trimethoprim]     Flagyl [Metronidazole]        Health Maintenance   Topic Date Due    DTaP/Tdap/Td vaccine (1 - Tdap) Never done    Shingles Vaccine (2 of 3) 09/10/2012    Annual Wellness Visit (AWV)  Never done    Lipid screen  09/09/2020    Flu vaccine (1) 09/01/2021    Potassium monitoring  06/11/2022    Creatinine monitoring  06/11/2022    Pneumococcal 65+ years Vaccine  Completed    COVID-19 Vaccine  Completed    Hepatitis A vaccine  Aged Out    Hepatitis B vaccine  Aged Out    Hib vaccine  Aged Out    Meningococcal (ACWY) vaccine  Aged Out       Subjective:      Review of Systems   Constitutional: Negative for chills and fever. HENT: Negative for hearing loss. Eyes: Negative for pain and visual disturbance. Respiratory: Negative for cough and shortness of breath. Cardiovascular: Negative for chest pain, palpitations and leg swelling. Gastrointestinal: Negative for abdominal pain, blood in stool, constipation, diarrhea, nausea and vomiting. Endocrine: Negative for cold intolerance, polydipsia and polyuria. Genitourinary: Negative for difficulty urinating, dysuria and hematuria. Musculoskeletal: Negative for arthralgias, back pain, gait problem and neck pain. Skin: Negative for pallor and rash. Neurological: Negative for dizziness, weakness, numbness and headaches. Hematological: Negative for adenopathy. Does not bruise/bleed easily. Psychiatric/Behavioral: Negative for confusion. The patient is not nervous/anxious. Objective:     Physical Exam  Vitals reviewed.    Constitutional: Appearance: She is well-developed. HENT:      Head: Normocephalic and atraumatic. Eyes:      Pupils: Pupils are equal, round, and reactive to light. Cardiovascular:      Rate and Rhythm: Normal rate and regular rhythm. Heart sounds: No murmur heard. No friction rub. No gallop. Pulmonary:      Effort: Pulmonary effort is normal.      Breath sounds: Normal breath sounds. No wheezing or rales. Abdominal:      General: There is no distension. Palpations: Abdomen is soft. There is no mass. Tenderness: There is no abdominal tenderness. There is no rebound. Musculoskeletal:         General: Normal range of motion. Cervical back: Neck supple. Lymphadenopathy:      Cervical: No cervical adenopathy. Skin:     General: Skin is warm and dry. Findings: No rash. Neurological:      Mental Status: She is alert and oriented to person, place, and time. Cranial Nerves: No cranial nerve deficit (grossly). Psychiatric:         Thought Content: Thought content normal.        /70 (Site: Left Upper Arm, Position: Sitting, Cuff Size: Medium Adult)   Pulse 58   Resp 16   Ht 5' 5\" (1.651 m)   Wt 99 lb (44.9 kg)   BMI 16.47 kg/m²     Assessment:       Diagnosis Orders   1. General medical exam  Basic Metabolic Panel   2. Essential hypertension  Comprehensive Metabolic Panel   3. Hyperlipidemia, unspecified hyperlipidemia type  Lipid Panel   4. Coronary artery disease due to lipid rich plaque     5. Medicare annual wellness visit, subsequent     6. Anemia, unspecified type  CBC    Hemoglobin    Iron And TIBC   7. Acquired hypothyroidism  TSH    T4, Free   8. Bladder wall thickening  Graciela Pa MD, Urology, [place]   9. Non-STEMI (non-ST elevated myocardial infarction) ([facility] [address]     10. Routine general medical examination at a health care facility     11.  Anxiety  PARoxetine (PAXIL) 20 MG tablet   Patient was reported to have a \"small heart attack\" on 6/11/2021 at [facility] with elevated troponins. We will have her see cardiology to consider further work-up and recommendations. Reviewed multiple test results. 6/11/2021 high troponin equals 73.    3/11/2020 normal TSH.    6/11/2021 okay hemoglobin at 16.1.    3/25/2021 mammogram negative. Told to continue Synthroid, Norvasc and Zocor. Plan:       Return in about 4 months (around 12/9/2021) for Hypertension, Hyperlipidemia, CAD. Orders Placed This Encounter   Procedures    CBC     Standing Status:   Future     Standing Expiration Date:   8/9/2022    Comprehensive Metabolic Panel     Standing Status:   Future     Standing Expiration Date:   8/9/2022    Lipid Panel     Standing Status:   Future     Standing Expiration Date:   8/9/2022     Order Specific Question:   Is Patient Fasting?/# of Hours     Answer:   yes    TSH     Standing Status:   Future     Standing Expiration Date:   8/9/2022    T4, Free     Standing Status:   Future     Standing Expiration Date:   8/9/2022    Hemoglobin     Standing Status:   Future     Standing Expiration Date:   8/9/2022    Basic Metabolic Panel     Standing Status:   Future     Standing Expiration Date:   8/9/2022    Iron And TIBC     Standing Status:   Future     Standing Expiration Date:   8/9/2022     Order Specific Question:   Is Patient Fasting? Answer:   na     Order Specific Question:   No of Hours? Answer:   Noemy Ovalles MD, Urology, [place]     Referral Priority:   Routine     Referral Type:   Eval and Treat     Referral Reason:   Specialty Services Required     Referred to Provider:   Rene Nevarez MD     Requested Specialty:   Urology     Number of Visits Requested:   1     Orders Placed This Encounter   Medications    PARoxetine (PAXIL) 20 MG tablet     Sig: TAKE ONE TABLET BY MOUTH DAILY     Dispense:  90 tablet     Refill:  3       Patientgiven educational materials - see patient instructions.   Discussed use, benefit,and side effects of prescribed medications. All patient questions answered. Ptvoiced understanding. Reviewed health maintenance. Instructed to continue currentmedications, diet and exercise. Patient agreed with treatment plan. Follow up asdirected.      Electronically signed by Judy Gamboa MD on 8/9/2021 at 4:12 PM

## 2021-08-09 NOTE — PROGRESS NOTES
Medicare Annual Wellness Visit  Name: Dany Toure Date:    MRN: [id number] Sex: Female   Age: 80 y.o. Ethnicity: Non- / Non    : 1932 Race: White (non-)      Sharon Hearn is here for Medicare AWV (3 month, is feeling a lot of anxiousness and stress. was previously on paxil, but has not taken it for some time now and feels that she needs to be on something), Hypertension, Hyperlipidemia, Coronary Artery Disease, and Hypothyroidism    Screenings for behavioral, psychosocial and functional/safety risks, and cognitive dysfunction are all negative except as indicated below. These results, as well as other patient data from the [address] form, are documented in Flowsheets linked to this Encounter. Allergies   Allergen Reactions    Bactrim [Sulfamethoxazole-Trimethoprim]     Flagyl [Metronidazole]          Prior to Visit Medications    Medication Sig Taking? Authorizing Provider   spironolactone (ALDACTONE) 25 MG tablet Take 25 mg by mouth daily Yes Historical Provider, MD   metoprolol succinate (TOPROL XL) 100 MG extended release tablet Take 100 mg by mouth daily Yes Historical Provider, MD   pantoprazole (PROTONIX) 40 MG tablet Take 40 mg by mouth daily Yes Historical Provider, MD   furosemide (LASIX) 20 MG tablet Take 20 mg by mouth 2 times daily Only takes on Tues, Thur and Sat Yes Historical Provider, MD   metoclopramide (REGLAN) 10 MG tablet Take 10 mg by mouth 2 times daily (before meals) Yes Historical Provider, MD   ferrous sulfate (IRON 325) 325 (65 Fe) MG tablet Take 325 mg by mouth 2 times daily (with meals) Yes Historical Provider, MD   amLODIPine (NORVASC) 5 MG tablet Take 5 mg by mouth daily  Historical Provider, MD   Misc.  Devices (WALKER) MISC Rolling walker with seat  Stewart Dalton MD   cloNIDine (CATAPRES) 0.2 MG tablet TAKE ONE TABLET BY MOUTH TWICE A DAY  Stewart Dalton MD   simvastatin (ZOCOR) 10 MG tablet TAKE ONE TABLET BY MOUTH EVERY EVENING  Az Torre MD   levothyroxine (SYNTHROID) 50 MCG tablet TAKE ONE TABLET BY MOUTH DAILY  Az Torre MD   PARoxetine (PAXIL) 20 MG tablet TAKE ONE TABLET BY MOUTH DAILY  MD Gera Clements. [address]      DX: cervical fracture with delayed healing  [place] CALEB Peres - CNP   fluticasone (FLONASE) 50 MCG/ACT nasal spray 1 spray each nostril daily. MD Gera Clements. Devices (BATH BENCH WITH BACK) [employer] [facility] [employer] 1 Device by Does not apply route 2 times daily  Adam Ignacio MD   alendronate (FOSAMAX) 70 MG tablet TAKE 1 TABLET BY MOUTH EVERY 7 DAYS AS DIRECTED  Patient not taking: Reported on 9/16/2019  Az Torre MD   acetaminophen (TYLENOL) 325 MG tablet Take 2 tablets by mouth every 4 hours as needed for Pain or Fever  [place] Hameed   docusate (COLACE, DULCOLAX) 100 MG CAPS Take 100 mg by mouth 2 times daily  [place] Hameed   calcium carbonate-vitamin D (CALTRATE 600+D) 600-400 MG-UNIT TABS per tab Take 1 tablet by mouth 2 times daily. Historical Provider, MD   Biotin 5000 MCG TABS Take 5,000 mcg by mouth daily. Historical Provider, MD   Multiple Vitamins-Minerals (MULTIVITAMIN PO) Take 1 tablet by mouth daily. Historical Provider, MD   aspirin 81 MG tablet Take 81 mg by mouth daily. Patient not taking: Reported on 8/9/2021  Historical Provider, MD         Past Medical History:   Diagnosis Date    Anxiety neurosis     Cataract     (Left eye).  History of peritonitis 2010    Hyperlipidemia     Hypertension     Macular edema     Pelvis fracture (HCC)     Pseudophakos     (Right eye).  Renal insufficiency     (Mild)       Past Surgical History:   Procedure Laterality Date    ABDOMINAL EXPLORATION SURGERY  04-    (Dr. Magalie Sorto) - With sigmoid colectomy and left colostomy with a Sukhwinder's procedure.     CATARACT REMOVAL     [facility], LAPAROSCOPIC  68-    COLONOSCOPY  06-    (Dr. Magalie Sorto) Status post Sukhwinder's procedure.  COLPOSCOPY  Remote    (for YISSEL-1).  CYSTOSCOPY  08-    (Dr. Kaitlin Bell) - Bilateral ureteral catheters.  DILATION AND CURETTAGE OF UTERUS  15-    (for post-menopausal bleeding).  DILATION AND CURETTAGE OF UTERUS  35-    (for menometrrohagia).  OTHER SURGICAL HISTORY  02-    YAG laser capsulotomy - right eye. (Dr. Edith Schwartz).  OTHER SURGICAL HISTORY  08-    (Dr. Kaitlin Bell) - Laparoscopic colostomy takedown with lysis of adhesions and colorectal anastomosis using the EEA-25 staple. (Also, preoperative bilateral lighted stent insertion per Dr. Karen Crwo).  SMALL INTESTINE SURGERY  08-    (Dr. Kaitlin Bell).  UMBILICAL HERNIA REPAIR  67-    YAG CAPSULOTOMY           Family History   Problem Relation Age of Onset    Cancer Mother         (unknown type)    Cancer Sister         (mediastinal cancer - unknown type).  Cancer Brother         (Stomach).  Cervical Cancer Neg Hx     Glaucoma Neg Hx        CareTeam (Including outside providers/suppliers regularly involved in providing care):   Patient Care Team:  Richard Angel MD as PCP - General (Internal Medicine)  Richard Angel MD as PCP - [facility] Empaneled Provider    Wt Readings from Last 3 Encounters:   08/09/21 99 lb (44.9 kg)   06/11/21 108 lb (49 kg)   09/16/19 108 lb (49 kg)     Vitals:    08/09/21 1545   BP: 122/70   Site: Left Upper Arm   Position: Sitting   Cuff Size: Medium Adult   Pulse: 58   Resp: 16   Weight: 99 lb (44.9 kg)   Height: 5' 5\" (1.651 m)     Body mass index is 16.47 kg/m². Based upon direct observation of the patient, evaluation of cognition reveals none. Patient's complete Health Risk Assessment and screening values have been reviewed and are found in Flowsheets. The following problems were reviewed today and where indicated follow up appointments were made and/or referrals ordered.     Positive Risk Factor Screenings with Interventions:            General Health and ACP:  General  In general, how would you say your health is?: Fair  In the past 7 days, have you experienced any of the following? New or Increased Pain, New or Increased Fatigue, Loneliness, Social Isolation, Stress or Anger?: None of These  Do you get the social and emotional support that you need?: Yes  Do you have a Living Will?: (!) No  Advance Directives     Power of Gustavo Gallagher Will ACP-Advance Directive ACP-Power of     Not on File Not on File Not on File Not on File      General Health Risk Interventions:  · No Living Will: declines living will at this time  · . Health Habits/Nutrition:  Health Habits/Nutrition  Do you exercise for at least 20 minutes 2-3 times per week?: Yes  Have you lost any weight without trying in the past 3 months?: No  Do you eat only one meal per day?: No  Have you seen the dentist within the past year?: (!) No  Body mass index: (!) 16.47  Health Habits/Nutrition Interventions:  · Dental exam overdue:  declines dental exam at this time  · . Hearing/Vision:  No exam data present  Hearing/Vision  Do you or your family notice any trouble with your hearing that hasn't been managed with hearing aids?: No  Do you have difficulty driving, watching TV, or doing any of your daily activities because of your eyesight?: No  Have you had an eye exam within the past year?: (!) No  Hearing/Vision Interventions:  · Vision concerns:  declines eye exam at this time  · .       Personalized Preventive Plan   Current Health Maintenance Status  Immunization History   Administered Date(s) Administered    COVID-19, Moderna, PF, 100mcg/0.5mL 01/22/2021, 02/19/2021    Influenza Virus Vaccine 11/12/2010, 11/02/2011, 11/06/2012    Influenza, High Dose (Fluzone 65 yrs and older) 11/05/2013, 11/03/2014, 10/29/2015, 11/07/2016, 11/02/2017, 11/01/2018    Influenza, Quadv, adjuvanted, 65 yrs +, IM, PF (Fluad) 10/02/2020    Influenza, Triv, inactivated, subunit, adjuvanted, IM (Fluad

## 2021-08-10 LAB
IRON SATURATION: 22 % (ref 20–55)
IRON: 51 UG/DL (ref 37–145)
THYROXINE, FREE: 1.37 NG/DL (ref 0.93–1.7)
TOTAL IRON BINDING CAPACITY: 236 UG/DL (ref 250–450)
UNSATURATED IRON BINDING CAPACITY: 185 UG/DL (ref 112–347)

## 2021-08-11 DIAGNOSIS — D64.9 ANEMIA, UNSPECIFIED TYPE: Primary | ICD-10-CM

## 2021-08-12 ENCOUNTER — TELEPHONE (OUTPATIENT)
Dept: INTERNAL MEDICINE | Age: 86
End: 2021-08-12

## 2021-08-12 NOTE — TELEPHONE ENCOUNTER
Patients daughter is wanting to know with her sodium being a little on the lower side if that could be what is causing her fatigue? Since her Thyroid and HGB and iron levels are all where they need to be.     Please advise    Call [place] [phone]

## 2021-08-12 NOTE — TELEPHONE ENCOUNTER
Sodium is somewhat low ,   but not down in the significantly low range that would likely affect fatigue. Certainly if you want to add extra salt to food that is allowed in May the numbers some. Offer recheck of BMP in 1 month. More likely the fact that at her age she recently underwent surgery and recently had cardiac issues are what is causing her fatigue.   Offer referral for cardiac rehab to try to increase endurance

## 2021-08-20 ENCOUNTER — PATIENT MESSAGE (OUTPATIENT)
Dept: INTERNAL MEDICINE | Age: 86
End: 2021-08-20

## 2021-08-20 DIAGNOSIS — N32.89 BLADDER WALL THICKENING: Primary | ICD-10-CM

## 2021-08-23 NOTE — TELEPHONE ENCOUNTER
Pend order for abdomen/pelvis CT with and without IV contrast.    also pend order to check creatinine blood test first    Also offer patient urology consult 1 week after CT.   Pend order for CT plus minus urology consult if they agree

## 2021-08-25 ENCOUNTER — HOSPITAL ENCOUNTER (OUTPATIENT)
Dept: LAB | Age: 86
Discharge: HOME | End: 2021-08-25
Payer: MEDICARE

## 2021-08-25 ENCOUNTER — OFFICE VISIT (OUTPATIENT)
Dept: INTERNAL MEDICINE | Age: 86
End: 2021-08-25
Payer: MEDICARE

## 2021-08-25 VITALS
HEIGHT: 65 IN | RESPIRATION RATE: 18 BRPM | WEIGHT: 99.2 LBS | HEART RATE: 66 BPM | DIASTOLIC BLOOD PRESSURE: 84 MMHG | BODY MASS INDEX: 16.53 KG/M2 | TEMPERATURE: 95.6 F | SYSTOLIC BLOOD PRESSURE: 134 MMHG

## 2021-08-25 DIAGNOSIS — I10 ESSENTIAL HYPERTENSION: Primary | ICD-10-CM

## 2021-08-25 DIAGNOSIS — E03.9 ACQUIRED HYPOTHYROIDISM: ICD-10-CM

## 2021-08-25 DIAGNOSIS — N32.89 BLADDER WALL THICKENING: ICD-10-CM

## 2021-08-25 DIAGNOSIS — D64.9 ANEMIA, UNSPECIFIED TYPE: ICD-10-CM

## 2021-08-25 DIAGNOSIS — R53.83 FATIGUE, UNSPECIFIED TYPE: ICD-10-CM

## 2021-08-25 DIAGNOSIS — E61.1 IRON DEFICIENCY: ICD-10-CM

## 2021-08-25 LAB
-: ABNORMAL
ABSOLUTE EOS #: 0.06 K/UL (ref 0–0.44)
ABSOLUTE IMMATURE GRANULOCYTE: <0.03 K/UL (ref 0–0.3)
ABSOLUTE LYMPH #: 1.73 K/UL (ref 1.1–3.7)
ABSOLUTE MONO #: 0.45 K/UL (ref 0.1–1.2)
ALBUMIN SERPL-MCNC: 4.4 G/DL (ref 3.5–5.2)
ALBUMIN/GLOBULIN RATIO: 0.9 (ref 1–2.5)
ALP BLD-CCNC: 84 U/L (ref 35–104)
ALT SERPL-CCNC: 11 U/L (ref 5–33)
AMORPHOUS: ABNORMAL
ANION GAP SERPL CALCULATED.3IONS-SCNC: 8 MMOL/L (ref 9–17)
AST SERPL-CCNC: 20 U/L
BACTERIA: ABNORMAL
BASOPHILS # BLD: 0 % (ref 0–2)
BASOPHILS ABSOLUTE: <0.03 K/UL (ref 0–0.2)
BILIRUB SERPL-MCNC: 0.29 MG/DL (ref 0.3–1.2)
BILIRUBIN URINE: NEGATIVE
BUN BLDV-MCNC: 22 MG/DL (ref 8–23)
BUN/CREAT BLD: 22 (ref 9–20)
CALCIUM SERPL-MCNC: 10.1 MG/DL (ref 8.6–10.4)
CASTS UA: ABNORMAL /LPF (ref 0–2)
CHLORIDE BLD-SCNC: 97 MMOL/L (ref 98–107)
CO2: 31 MMOL/L (ref 20–31)
COLOR: NORMAL
COMMENT UA: NORMAL
CREAT SERPL-MCNC: 1.02 MG/DL (ref 0.5–0.9)
CRYSTALS, UA: ABNORMAL /HPF
DIFFERENTIAL TYPE: ABNORMAL
EOSINOPHILS RELATIVE PERCENT: 1 % (ref 1–4)
EPITHELIAL CELLS UA: ABNORMAL /HPF (ref 0–5)
FERRITIN: 703 UG/L (ref 13–150)
GFR AFRICAN AMERICAN: >60 ML/MIN
GFR NON-AFRICAN AMERICAN: 51 ML/MIN
GFR SERPL CREATININE-BSD FRML MDRD: ABNORMAL ML/MIN/{1.73_M2}
GFR SERPL CREATININE-BSD FRML MDRD: ABNORMAL ML/MIN/{1.73_M2}
GLUCOSE BLD-MCNC: 96 MG/DL (ref 70–99)
GLUCOSE URINE: NEGATIVE
HCT VFR BLD CALC: 44 % (ref 36.3–47.1)
HEMOGLOBIN: 13.9 G/DL (ref 11.9–15.1)
HEMOGLOBIN: 13.9 G/DL (ref 11.9–15.1)
IMMATURE GRANULOCYTES: 0 %
IRON SATURATION: 34 % (ref 20–55)
IRON SATURATION: 34 % (ref 20–55)
IRON: 96 UG/DL (ref 37–145)
IRON: 96 UG/DL (ref 37–145)
KETONES, URINE: NEGATIVE
LEUKOCYTE ESTERASE, URINE: NEGATIVE
LYMPHOCYTES # BLD: 21 % (ref 24–43)
MCH RBC QN AUTO: 30.3 PG (ref 25.2–33.5)
MCHC RBC AUTO-ENTMCNC: 31.6 G/DL (ref 25.2–33.5)
MCV RBC AUTO: 95.9 FL (ref 82.6–102.9)
MONOCYTES # BLD: 6 % (ref 3–12)
MUCUS: ABNORMAL
NITRITE, URINE: NEGATIVE
NRBC AUTOMATED: 0 PER 100 WBC
OTHER OBSERVATIONS UA: ABNORMAL
PDW BLD-RTO: 15.2 % (ref 11.8–14.4)
PH UA: 6 (ref 5–6)
PLATELET # BLD: 237 K/UL (ref 138–453)
PLATELET ESTIMATE: ABNORMAL
PMV BLD AUTO: 10.7 FL (ref 8.1–13.5)
POTASSIUM SERPL-SCNC: 4.9 MMOL/L (ref 3.7–5.3)
PROTEIN UA: NEGATIVE
RBC # BLD: 4.59 M/UL (ref 3.95–5.11)
RBC # BLD: ABNORMAL 10*6/UL
RBC UA: ABNORMAL /HPF (ref 0–4)
RENAL EPITHELIAL, UA: ABNORMAL /HPF
SEDIMENTATION RATE, ERYTHROCYTE: 46 MM (ref 0–30)
SEG NEUTROPHILS: 72 % (ref 36–65)
SEGMENTED NEUTROPHILS ABSOLUTE COUNT: 5.88 K/UL (ref 1.5–8.1)
SODIUM BLD-SCNC: 136 MMOL/L (ref 135–144)
SPECIFIC GRAVITY UA: 1.02 (ref 1.01–1.02)
TOTAL CK: 27 U/L (ref 26–192)
TOTAL IRON BINDING CAPACITY: 286 UG/DL (ref 250–450)
TOTAL IRON BINDING CAPACITY: 286 UG/DL (ref 250–450)
TOTAL PROTEIN: 9.5 G/DL (ref 6.4–8.3)
TRICHOMONAS: ABNORMAL
TSH SERPL DL<=0.05 MIU/L-ACNC: 0.84 MIU/L (ref 0.3–5)
TURBIDITY: NORMAL
UNSATURATED IRON BINDING CAPACITY: 190 UG/DL (ref 112–347)
UNSATURATED IRON BINDING CAPACITY: 190 UG/DL (ref 112–347)
URINE HGB: NEGATIVE
UROBILINOGEN, URINE: NORMAL
VITAMIN B-12: 679 PG/ML (ref 232–1245)
WBC # BLD: 8.2 K/UL (ref 3.5–11.3)
WBC # BLD: ABNORMAL 10*3/UL
WBC UA: ABNORMAL /HPF (ref 0–4)
YEAST: ABNORMAL

## 2021-08-25 PROCEDURE — 4040F PNEUMOC VAC/ADMIN/RCVD: CPT | Performed by: INTERNAL MEDICINE

## 2021-08-25 PROCEDURE — 1090F PRES/ABSN URINE INCON ASSESS: CPT | Performed by: INTERNAL MEDICINE

## 2021-08-25 PROCEDURE — 82607 VITAMIN B-12: CPT

## 2021-08-25 PROCEDURE — 83550 IRON BINDING TEST: CPT

## 2021-08-25 PROCEDURE — 85651 RBC SED RATE NONAUTOMATED: CPT

## 2021-08-25 PROCEDURE — 99214 OFFICE O/P EST MOD 30 MIN: CPT | Performed by: INTERNAL MEDICINE

## 2021-08-25 PROCEDURE — G8419 CALC BMI OUT NRM PARAM NOF/U: HCPCS | Performed by: INTERNAL MEDICINE

## 2021-08-25 PROCEDURE — 85025 COMPLETE CBC W/AUTO DIFF WBC: CPT

## 2021-08-25 PROCEDURE — 1036F TOBACCO NON-USER: CPT | Performed by: INTERNAL MEDICINE

## 2021-08-25 PROCEDURE — 99213 OFFICE O/P EST LOW 20 MIN: CPT

## 2021-08-25 PROCEDURE — G8427 DOCREV CUR MEDS BY ELIG CLIN: HCPCS | Performed by: INTERNAL MEDICINE

## 2021-08-25 PROCEDURE — 84443 ASSAY THYROID STIM HORMONE: CPT

## 2021-08-25 PROCEDURE — 82550 ASSAY OF CK (CPK): CPT

## 2021-08-25 PROCEDURE — 81001 URINALYSIS AUTO W/SCOPE: CPT

## 2021-08-25 PROCEDURE — 83540 ASSAY OF IRON: CPT

## 2021-08-25 PROCEDURE — 36415 COLL VENOUS BLD VENIPUNCTURE: CPT

## 2021-08-25 PROCEDURE — 1123F ACP DISCUSS/DSCN MKR DOCD: CPT | Performed by: INTERNAL MEDICINE

## 2021-08-25 PROCEDURE — 82728 ASSAY OF FERRITIN: CPT

## 2021-08-25 PROCEDURE — 80053 COMPREHEN METABOLIC PANEL: CPT

## 2021-08-25 PROCEDURE — 85018 HEMOGLOBIN: CPT

## 2021-08-25 NOTE — PROGRESS NOTES
[facility] INTERNAL MEDICINE    Visit Date:  8/25/2021  Patient:  Sara Stuart  YOB: 1932    Assessment & Plan     Fatigue: Cause unclear at this time. Possibly related to depression and hypothyroidism, for which she is already being treated. However, given the recent change in status, will order blood work and UA to assess possible causes. Last BMP showed hyponatremia so we will recheck that to. If everything comes back normal, then would consider increasing dose/augmenting depression treatment. Hypertension: Blood pressure controlled today. Continue amlodipine and metoprolol. Hypothyroidism: We will obtain TFTs. Continue same dose of levothyroxine for now       Diagnosis Orders   1. Essential hypertension     2. Fatigue, unspecified type  CBC Auto Differential    Comprehensive Metabolic Panel    Vitamin B12    Urinalysis Reflex to Culture    Ferritin    Iron and TIBC    TSH with Reflex    Sedimentation Rate   3. Iron deficiency  Ferritin    Iron and TIBC   4. Acquired hypothyroidism         Chief Complaint  Fatigue (x2 wks) and Weight Loss    History of Present Illness   Presents with her son who reports that she has not been acting like herself for the last month. He says that she is not being interactive, not talking too much. He says that she underwent surgery around a month ago, and went to a rehab facility and seems to be doing okay. However, a week after she came back home she started acting unusual.  She herself says that she does not have interest in anything. Says that her appetite is okay, has been eating 3 meals a day. Denies nausea, vomiting, abdominal pain, diarrhea, constipation. Upon interviewing her, she does not seem to be interactive, and stares into the distance. Denies chest pain, shortness of breath, but mentions slight lower extremity edema.   She is on Paxil for anxiety/depression, and uses levothyroxine for hypothyroidism. Objective  /84   Pulse 66   Temp 95.6 °F (35.3 °C) (Tympanic)   Resp 18   Ht 5' 5\" (1.651 m)   Wt 99 lb 3.2 oz (45 kg)   BMI 16.51 kg/m²   Constitutional: No acute distress. Sits in chair comfortably  Eyes: Sclerae nonicteric. No lid lag or proptosis  HENT: External ears normal. No external lesions on the nose  Neck: No gross masses. Trachea visibly midline  Respiratory: Good air entry bilaterally. No wheezing or crackles  Cardiovascular: Normal S1-S2. No murmurs. Minimal lower extremity edema  Gastrointestinal: No visible masses. No visible hernias  Skin: No abnormal rashes. No abnormal masses  Neurologic: Cranial nerves grossly intact  Psychiatric: Normal affect. Alert and oriented    Medications  Current Outpatient Medications:     spironolactone (ALDACTONE) 25 MG tablet, Take 25 mg by mouth daily, Disp: , Rfl:     metoprolol succinate (TOPROL XL) 100 MG extended release tablet, Take 100 mg by mouth daily Pt takes 25mg daily, Disp: , Rfl:     pantoprazole (PROTONIX) 40 MG tablet, Take 40 mg by mouth daily, Disp: , Rfl:     furosemide (LASIX) 20 MG tablet, Take 20 mg by mouth 2 times daily Only takes on Tues, Thur and Sat, Disp: , Rfl:     metoclopramide (REGLAN) 10 MG tablet, Take 10 mg by mouth 2 times daily (before meals), Disp: , Rfl:     ferrous sulfate (IRON 325) 325 (65 Fe) MG tablet, Take 325 mg by mouth 2 times daily (with meals), Disp: , Rfl:     PARoxetine (PAXIL) 20 MG tablet, TAKE ONE TABLET BY MOUTH DAILY, Disp: 90 tablet, Rfl: 3    amLODIPine (NORVASC) 5 MG tablet, Take 5 mg by mouth daily, Disp: , Rfl:     Misc.  Devices (WALKER) MISC, Rolling walker with seat, Disp: 1 each, Rfl: 0    cloNIDine (CATAPRES) 0.2 MG tablet, TAKE ONE TABLET BY MOUTH TWICE A DAY (Patient taking differently: Pt taking one tab at night), Disp: 180 tablet, Rfl: 2    simvastatin (ZOCOR) 10 MG tablet, TAKE ONE TABLET BY MOUTH EVERY EVENING, Disp: 90 tablet, Rfl: 3    levothyroxine (SYNTHROID) 50 MCG tablet, TAKE ONE TABLET BY MOUTH DAILY, Disp: 90 tablet, Rfl: 3    Misc. Devices [place] 4 bed   DX: cervical fracture with delayed healing, Disp: 1 Device, Rfl: 0    fluticasone (FLONASE) 50 MCG/ACT nasal spray, 1 spray each nostril daily. , Disp: 3 Bottle, Rfl: 3    Misc. Devices (BATH BENCH WITH BACK) MISC, 1 Device by Does not apply route 2 times daily, Disp: 1 each, Rfl: 0    acetaminophen (TYLENOL) 325 MG tablet, Take 2 tablets by mouth every 4 hours as needed for Pain or Fever, Disp: 120 tablet, Rfl: 0    calcium carbonate-vitamin D (CALTRATE 600+D) 600-400 MG-UNIT TABS per tab, Take 1 tablet by mouth 2 times daily. , Disp: , Rfl:     Multiple Vitamins-Minerals (MULTIVITAMIN PO), Take 1 tablet by mouth daily. , Disp: , Rfl:     alendronate (FOSAMAX) 70 MG tablet, TAKE 1 TABLET BY MOUTH EVERY 7 DAYS AS DIRECTED (Patient not taking: Reported on 9/16/2019), Disp: 12 tablet, Rfl: 3    aspirin 81 MG tablet, Take 81 mg by mouth daily. (Patient not taking: Reported on 8/9/2021), Disp: , Rfl:     Allergies  Bactrim [sulfamethoxazole-trimethoprim] and Flagyl [metronidazole]    Past Medical History:   Diagnosis Date    Anxiety neurosis     Cataract     (Left eye).  History of peritonitis 2010    Hyperlipidemia     Hypertension     Macular edema     Pelvis fracture (HCC)     Pseudophakos     (Right eye).  Renal insufficiency     (Mild)     Past Surgical History:   Procedure Laterality Date    ABDOMINAL EXPLORATION SURGERY  04-    (Dr. Baron Herzog) - With sigmoid colectomy and left colostomy with a Sukhwinder's procedure.  CATARACT REMOVAL     Leo Brian LAPAROSCOPIC  71-    COLONOSCOPY  06-    (Dr. Baron Herzog) Status post Sukhwinder's procedure.  COLPOSCOPY  Remote    (for YISSEL-1).  CYSTOSCOPY  08-    (Dr. Baron Herzog) - Bilateral ureteral catheters.  DILATION AND CURETTAGE OF UTERUS  06-    (for post-menopausal bleeding).     DILATION AND CURETTAGE OF UTERUS  35-    (for menometrrohagia).  OTHER SURGICAL HISTORY  02-    YAG laser capsulotomy - right eye. (Dr. Rosa Gonzáles).  OTHER SURGICAL HISTORY  08-    (Dr. Chato Rm) - Laparoscopic colostomy takedown with lysis of adhesions and colorectal anastomosis using the EEA-25 staple. (Also, preoperative bilateral lighted stent insertion per Dr. Junior Hammer).  SMALL INTESTINE SURGERY  08-    (Dr. Chato Rm).  UMBILICAL HERNIA REPAIR  48-    YAG CAPSULOTOMY       Family History  This patient's family history includes Cancer in her brother, mother, and sister. Social History  Javi Wilson  reports that she has never smoked. She has never used smokeless tobacco. She reports that she does not drink alcohol and does not use drugs. Discussed use, benefit, and side effects of prescribed medications. All questions answered. Patient voiced understanding. Reviewed health maintenance. Electronically signed Jacky Butts MD on 8/25/2021 at 10:20 AM    This note has been created using the Epic electronic health record, and dictated in part by [employer] dictation system. Despite the documenting physician's best efforts, there may be errors in spelling, grammar or syntax.

## 2021-08-26 DIAGNOSIS — R53.83 FATIGUE, UNSPECIFIED TYPE: Primary | ICD-10-CM

## 2021-08-26 DIAGNOSIS — E61.1 IRON DEFICIENCY: ICD-10-CM

## 2021-08-27 ENCOUNTER — HOSPITAL ENCOUNTER (OUTPATIENT)
Dept: CT IMAGING | Age: 86
Discharge: HOME | End: 2021-08-29
Payer: MEDICARE

## 2021-08-27 DIAGNOSIS — N32.89 BLADDER WALL THICKENING: ICD-10-CM

## 2021-08-27 PROCEDURE — 6360000004 HC RX CONTRAST MEDICATION: Performed by: INTERNAL MEDICINE

## 2021-08-27 PROCEDURE — 2709999900 CT ABDOMEN PELVIS W IV CONTRAST

## 2021-08-27 RX ADMIN — IOPAMIDOL 100 ML: 755 INJECTION, SOLUTION INTRAVENOUS at 09:20

## 2021-08-31 ENCOUNTER — TELEPHONE (OUTPATIENT)
Dept: INTERNAL MEDICINE | Age: 86
End: 2021-08-31

## 2021-08-31 DIAGNOSIS — R53.83 FATIGUE, UNSPECIFIED TYPE: ICD-10-CM

## 2021-08-31 DIAGNOSIS — R63.4 WEIGHT LOSS: ICD-10-CM

## 2021-08-31 DIAGNOSIS — M62.838 MUSCLE SPASM: Primary | ICD-10-CM

## 2021-08-31 DIAGNOSIS — E78.5 HYPERLIPIDEMIA, UNSPECIFIED HYPERLIPIDEMIA TYPE: ICD-10-CM

## 2021-08-31 DIAGNOSIS — K86.89 PANCREATIC MASS: Primary | ICD-10-CM

## 2021-08-31 NOTE — TELEPHONE ENCOUNTER
----- Message from Uma Rae MD sent at 8/30/2021 12:30 PM EDT -----  In reguards to the elevated sed rate,  Recall patient had recent surgery earlier this summer and there could be residual inflammation from that. Therefore, would order repeat sed rate and CRP in 3 weeks   (pend orders)   to reassess if the trend is increasing and may or may not require treatment for possible PMR. Recent CT showed possible tiny pancreatic cyst versus mass.  (pend  order to )Refer patient for GI consult ASAP to consider EGD with endoscopic ultrasound , to better assess the pancreas and give any follow-up recommendations. Also recommend referring patient to cardiology here    (pend  referral),   as her fatigue and general weakness could be evidence of cardiac issues. I know she has tried to work with Dr. Jo-Ann Hutchinson but with the last phone call to their office they referred patient back to our office rather than doing any additional stress test etc. work-up to try to see if heart is the issue (with the fatigue and general weakness, and cardiac problems)    Schedule patient for return visit in our office in 1 month to review all the above. Tell pt try to get 6 small meals a day to try to increase energy and weight . Also tell them if symptoms continue to get worse then they should call earlier  ----- Message -----  From: Christos Celestin MD  Sent: 8/27/2021   3:05 PM EDT  To: Uma Rae MD, Nancy Lawson LPN    Hello,    I guess there might have been some confusion, because I saw the patient a couple of days ago, I ordered blood work which you might have not seen. I had seen her with weakness, lack of energy, and she was quiet and not much interactive, and I was told by her son that this has been a change in her status for the last couple weeks. I ordered blood work to assess. She had proximal muscle weakness and so I ordered the ESR. The ESR came back elevated, so she might have polymyalgia rheumatica.   Her son asked me to call him for the results, but I just saw that you had addressed some other blood work with her. I have not called her yet, but I am not sure how to proceed.

## 2021-08-31 NOTE — TELEPHONE ENCOUNTER
Last appt: 8/25/2021  Next appt: 9/14/2021    Patient family has been looking into getting patient and her  into assisted living in [address]. They did an evaluation and patient is needing to go to [address] [place] [address] Drive first to do PT/ OT / ST for 4 weeks before she can go to [place].  If we can just fax the order for the PT/OT/ST to [facility] in [facility] and [place] they will schedule the patient for the therapy. We need to send the H&P, med list, along with the orders.

## 2021-08-31 NOTE — TELEPHONE ENCOUNTER
----- Message from Roe Dumont MD sent at 8/30/2021  1:16 PM EDT -----  Pend order to refer patient for urology consult to  consider cystoscopy to better assess for the possible bladder wall thickening. Also pend order for GI consult to review the imaging of the pancreatic presumed benign cyst/density and decide if any additional work-up needs to be done at this time. GI consult could potentially be helpful for the weight loss as well. Please explain both of the above and encouraged them to comply with this.   You may need to talk to the patient's son or daughter, who have  helped coordinate care recently

## 2021-08-31 NOTE — TELEPHONE ENCOUNTER
Patient is already scheduled to see Urology on Sept 20 here at the clinic. I did pend order for a Gastro doctor the family was going to call and set that appt up.

## 2021-08-31 NOTE — TELEPHONE ENCOUNTER
Ordered and faxed to both [facility] and [facility]

## 2021-09-02 NOTE — TELEPHONE ENCOUNTER
We had a request from the daughter to send orders for the patient and the patients  to be sent to [facility] for PT and OT so that way they can be transferred to [facility] in [facility] for assisted living.

## 2021-09-10 ENCOUNTER — HOSPITAL ENCOUNTER (OUTPATIENT)
Age: 86
Setting detail: SPECIMEN
Discharge: HOME | End: 2021-09-10
Payer: MEDICARE

## 2021-09-10 DIAGNOSIS — R30.0 DYSURIA: ICD-10-CM

## 2021-09-10 DIAGNOSIS — R30.0 DYSURIA: Primary | ICD-10-CM

## 2021-09-10 LAB
-: ABNORMAL
ABSOLUTE EOS #: 0.06 K/UL (ref 0–0.44)
ABSOLUTE IMMATURE GRANULOCYTE: 0.05 K/UL (ref 0–0.3)
ABSOLUTE LYMPH #: 1.51 K/UL (ref 1.1–3.7)
ABSOLUTE MONO #: 0.44 K/UL (ref 0.1–1.2)
ALBUMIN SERPL-MCNC: 4.2 G/DL (ref 3.5–5.2)
ALBUMIN/GLOBULIN RATIO: 0.9 (ref 1–2.5)
ALP BLD-CCNC: 88 U/L (ref 35–104)
ALT SERPL-CCNC: 11 U/L (ref 5–33)
AMORPHOUS: ABNORMAL
ANION GAP SERPL CALCULATED.3IONS-SCNC: 9 MMOL/L (ref 9–17)
AST SERPL-CCNC: 25 U/L
BACTERIA: ABNORMAL
BASOPHILS # BLD: 1 % (ref 0–2)
BASOPHILS ABSOLUTE: 0.03 K/UL (ref 0–0.2)
BILIRUB SERPL-MCNC: 0.27 MG/DL (ref 0.3–1.2)
BILIRUBIN URINE: NEGATIVE
BUN BLDV-MCNC: 27 MG/DL (ref 8–23)
BUN/CREAT BLD: 26 (ref 9–20)
CALCIUM SERPL-MCNC: 9.9 MG/DL (ref 8.6–10.4)
CASTS UA: ABNORMAL /LPF (ref 0–2)
CASTS UA: ABNORMAL /LPF (ref 0–2)
CHLORIDE BLD-SCNC: 97 MMOL/L (ref 98–107)
CO2: 29 MMOL/L (ref 20–31)
COLOR: ABNORMAL
COMMENT UA: ABNORMAL
CREAT SERPL-MCNC: 1.02 MG/DL (ref 0.5–0.9)
CRYSTALS, UA: ABNORMAL /HPF
DIFFERENTIAL TYPE: ABNORMAL
EOSINOPHILS RELATIVE PERCENT: 1 % (ref 1–4)
EPITHELIAL CELLS UA: ABNORMAL /HPF (ref 0–5)
FERRITIN: 643 UG/L (ref 13–150)
GFR AFRICAN AMERICAN: >60 ML/MIN
GFR NON-AFRICAN AMERICAN: 51 ML/MIN
GFR SERPL CREATININE-BSD FRML MDRD: ABNORMAL ML/MIN/{1.73_M2}
GFR SERPL CREATININE-BSD FRML MDRD: ABNORMAL ML/MIN/{1.73_M2}
GLUCOSE BLD-MCNC: 131 MG/DL (ref 70–99)
GLUCOSE URINE: NEGATIVE
HCT VFR BLD CALC: 44.3 % (ref 36.3–47.1)
HEMOGLOBIN: 14.4 G/DL (ref 11.9–15.1)
IMMATURE GRANULOCYTES: 1 %
IRON SATURATION: 32 % (ref 20–55)
IRON: 90 UG/DL (ref 37–145)
KETONES, URINE: NEGATIVE
LEUKOCYTE ESTERASE, URINE: NEGATIVE
LYMPHOCYTES # BLD: 23 % (ref 24–43)
MCH RBC QN AUTO: 30 PG (ref 25.2–33.5)
MCHC RBC AUTO-ENTMCNC: 32.5 G/DL (ref 25.2–33.5)
MCV RBC AUTO: 92.3 FL (ref 82.6–102.9)
MONOCYTES # BLD: 7 % (ref 3–12)
MUCUS: ABNORMAL
NITRITE, URINE: NEGATIVE
NRBC AUTOMATED: 0 PER 100 WBC
OTHER OBSERVATIONS UA: ABNORMAL
PDW BLD-RTO: 14.1 % (ref 11.8–14.4)
PH UA: 6 (ref 5–6)
PLATELET # BLD: 332 K/UL (ref 138–453)
PLATELET ESTIMATE: ABNORMAL
PMV BLD AUTO: 10.7 FL (ref 8.1–13.5)
POTASSIUM SERPL-SCNC: 4.3 MMOL/L (ref 3.7–5.3)
PROTEIN UA: NEGATIVE
RBC # BLD: 4.8 M/UL (ref 3.95–5.11)
RBC # BLD: ABNORMAL 10*6/UL
RBC UA: ABNORMAL /HPF (ref 0–4)
RENAL EPITHELIAL, UA: ABNORMAL /HPF
SEDIMENTATION RATE, ERYTHROCYTE: 63 MM (ref 0–30)
SEG NEUTROPHILS: 67 % (ref 36–65)
SEGMENTED NEUTROPHILS ABSOLUTE COUNT: 4.43 K/UL (ref 1.5–8.1)
SODIUM BLD-SCNC: 135 MMOL/L (ref 135–144)
SPECIFIC GRAVITY UA: 1.01 (ref 1.01–1.02)
TOTAL IRON BINDING CAPACITY: 277 UG/DL (ref 250–450)
TOTAL PROTEIN: 9 G/DL (ref 6.4–8.3)
TRICHOMONAS: ABNORMAL
TSH SERPL DL<=0.05 MIU/L-ACNC: 4.83 MIU/L (ref 0.3–5)
TURBIDITY: ABNORMAL
UNSATURATED IRON BINDING CAPACITY: 187 UG/DL (ref 112–347)
URINE HGB: ABNORMAL
UROBILINOGEN, URINE: NORMAL
VITAMIN B-12: 587 PG/ML (ref 232–1245)
WBC # BLD: 6.5 K/UL (ref 3.5–11.3)
WBC # BLD: ABNORMAL 10*3/UL
WBC UA: ABNORMAL /HPF (ref 0–4)
YEAST: ABNORMAL

## 2021-09-10 PROCEDURE — 82607 VITAMIN B-12: CPT

## 2021-09-10 PROCEDURE — 83550 IRON BINDING TEST: CPT

## 2021-09-10 PROCEDURE — 81001 URINALYSIS AUTO W/SCOPE: CPT

## 2021-09-10 PROCEDURE — 83540 ASSAY OF IRON: CPT

## 2021-09-10 PROCEDURE — 84443 ASSAY THYROID STIM HORMONE: CPT

## 2021-09-10 PROCEDURE — 82728 ASSAY OF FERRITIN: CPT

## 2021-09-10 PROCEDURE — 80053 COMPREHEN METABOLIC PANEL: CPT

## 2021-09-10 PROCEDURE — 85651 RBC SED RATE NONAUTOMATED: CPT

## 2021-09-10 PROCEDURE — 85025 COMPLETE CBC W/AUTO DIFF WBC: CPT

## 2021-09-15 ENCOUNTER — HOSPITAL ENCOUNTER (EMERGENCY)
Facility: CLINIC | Age: 86
Discharge: ANOTHER HOSPITAL | End: 2021-09-15
Attending: EMERGENCY MEDICINE
Payer: MEDICARE

## 2021-09-15 ENCOUNTER — APPOINTMENT (OUTPATIENT)
Dept: GENERAL RADIOLOGY | Facility: CLINIC | Age: 86
End: 2021-09-15
Payer: MEDICARE

## 2021-09-15 ENCOUNTER — APPOINTMENT (OUTPATIENT)
Dept: CT IMAGING | Facility: CLINIC | Age: 86
End: 2021-09-15
Payer: MEDICARE

## 2021-09-15 ENCOUNTER — HOSPITAL ENCOUNTER (INPATIENT)
Age: 86
LOS: 2 days | Discharge: NURSING, REHAB OR LONG-TERM CARE | DRG: 542 | End: 2021-09-17
Attending: INTERNAL MEDICINE | Admitting: INTERNAL MEDICINE
Payer: MEDICARE

## 2021-09-15 VITALS
TEMPERATURE: 97.8 F | WEIGHT: 99 LBS | HEART RATE: 84 BPM | BODY MASS INDEX: 16.47 KG/M2 | OXYGEN SATURATION: 100 % | DIASTOLIC BLOOD PRESSURE: 75 MMHG | RESPIRATION RATE: 18 BRPM | SYSTOLIC BLOOD PRESSURE: 133 MMHG

## 2021-09-15 DIAGNOSIS — F41.9 ANXIETY: ICD-10-CM

## 2021-09-15 DIAGNOSIS — S32.82XA MULTIPLE CLOSED FRACTURES OF PELVIS WITHOUT DISRUPTION OF PELVIC RING, INITIAL ENCOUNTER (HCC): Primary | ICD-10-CM

## 2021-09-15 PROBLEM — M84.454A: Status: ACTIVE | Noted: 2021-09-15

## 2021-09-15 LAB
ABSOLUTE EOS #: 0 K/UL (ref 0–0.4)
ABSOLUTE IMMATURE GRANULOCYTE: ABNORMAL K/UL (ref 0–0.3)
ABSOLUTE LYMPH #: 1 K/UL (ref 1–4.8)
ABSOLUTE MONO #: 0.8 K/UL (ref 0.1–1.2)
ALBUMIN SERPL-MCNC: 3.9 G/DL (ref 3.5–5.2)
ALBUMIN/GLOBULIN RATIO: 0.9 (ref 1–2.5)
ALP BLD-CCNC: 85 U/L (ref 35–104)
ALT SERPL-CCNC: 10 U/L (ref 5–33)
ANION GAP SERPL CALCULATED.3IONS-SCNC: 8 MMOL/L (ref 9–17)
AST SERPL-CCNC: 25 U/L
BASOPHILS # BLD: 0 % (ref 0–2)
BASOPHILS ABSOLUTE: 0 K/UL (ref 0–0.2)
BILIRUB SERPL-MCNC: 0.3 MG/DL (ref 0.3–1.2)
BILIRUBIN URINE: NEGATIVE
BUN BLDV-MCNC: 33 MG/DL (ref 8–23)
BUN/CREAT BLD: ABNORMAL (ref 9–20)
CALCIUM SERPL-MCNC: 9.4 MG/DL (ref 8.6–10.4)
CHLORIDE BLD-SCNC: 99 MMOL/L (ref 98–107)
CO2: 30 MMOL/L (ref 20–31)
COLOR: YELLOW
COMMENT UA: NORMAL
CREAT SERPL-MCNC: 0.8 MG/DL (ref 0.5–0.9)
DIFFERENTIAL TYPE: ABNORMAL
EOSINOPHILS RELATIVE PERCENT: 0 % (ref 1–4)
GFR AFRICAN AMERICAN: >60 ML/MIN
GFR NON-AFRICAN AMERICAN: >60 ML/MIN
GFR SERPL CREATININE-BSD FRML MDRD: ABNORMAL ML/MIN/{1.73_M2}
GFR SERPL CREATININE-BSD FRML MDRD: ABNORMAL ML/MIN/{1.73_M2}
GLUCOSE BLD-MCNC: 126 MG/DL (ref 70–99)
GLUCOSE URINE: NEGATIVE
HCT VFR BLD CALC: 37.9 % (ref 36–46)
HEMOGLOBIN: 12.6 G/DL (ref 12–16)
IMMATURE GRANULOCYTES: ABNORMAL %
KETONES, URINE: NEGATIVE
LEUKOCYTE ESTERASE, URINE: NEGATIVE
LYMPHOCYTES # BLD: 7 % (ref 24–44)
MCH RBC QN AUTO: 30.2 PG (ref 26–34)
MCHC RBC AUTO-ENTMCNC: 33.3 G/DL (ref 31–37)
MCV RBC AUTO: 90.6 FL (ref 80–100)
MONOCYTES # BLD: 5 % (ref 2–11)
NITRITE, URINE: NEGATIVE
NRBC AUTOMATED: ABNORMAL PER 100 WBC
PDW BLD-RTO: 14.8 % (ref 12.5–15.4)
PH UA: 7.5 (ref 5–8)
PLATELET # BLD: 246 K/UL (ref 140–450)
PLATELET ESTIMATE: ABNORMAL
PMV BLD AUTO: 8.4 FL (ref 6–12)
POTASSIUM SERPL-SCNC: 3.9 MMOL/L (ref 3.7–5.3)
PROTEIN UA: NEGATIVE
RBC # BLD: 4.18 M/UL (ref 4–5.2)
RBC # BLD: ABNORMAL 10*6/UL
SARS-COV-2, RAPID: NOT DETECTED
SEG NEUTROPHILS: 88 % (ref 36–66)
SEGMENTED NEUTROPHILS ABSOLUTE COUNT: 13.5 K/UL (ref 1.8–7.7)
SODIUM BLD-SCNC: 137 MMOL/L (ref 135–144)
SPECIFIC GRAVITY UA: 1.02 (ref 1–1.03)
SPECIMEN DESCRIPTION: NORMAL
TOTAL PROTEIN: 8.4 G/DL (ref 6.4–8.3)
TURBIDITY: CLEAR
URINE HGB: NEGATIVE
UROBILINOGEN, URINE: NORMAL
WBC # BLD: 15.3 K/UL (ref 3.5–11)
WBC # BLD: ABNORMAL 10*3/UL

## 2021-09-15 PROCEDURE — 81003 URINALYSIS AUTO W/O SCOPE: CPT

## 2021-09-15 PROCEDURE — 36415 COLL VENOUS BLD VENIPUNCTURE: CPT

## 2021-09-15 PROCEDURE — 96376 TX/PRO/DX INJ SAME DRUG ADON: CPT

## 2021-09-15 PROCEDURE — 6360000002 HC RX W HCPCS: Performed by: NURSE PRACTITIONER

## 2021-09-15 PROCEDURE — 73030 X-RAY EXAM OF SHOULDER: CPT

## 2021-09-15 PROCEDURE — 99285 EMERGENCY DEPT VISIT HI MDM: CPT

## 2021-09-15 PROCEDURE — 80053 COMPREHEN METABOLIC PANEL: CPT

## 2021-09-15 PROCEDURE — 74177 CT ABD & PELVIS W/CONTRAST: CPT

## 2021-09-15 PROCEDURE — 6360000004 HC RX CONTRAST MEDICATION: Performed by: EMERGENCY MEDICINE

## 2021-09-15 PROCEDURE — 6370000000 HC RX 637 (ALT 250 FOR IP): Performed by: NURSE PRACTITIONER

## 2021-09-15 PROCEDURE — 73502 X-RAY EXAM HIP UNI 2-3 VIEWS: CPT

## 2021-09-15 PROCEDURE — 85025 COMPLETE CBC W/AUTO DIFF WBC: CPT

## 2021-09-15 PROCEDURE — 70450 CT HEAD/BRAIN W/O DYE: CPT

## 2021-09-15 PROCEDURE — 6360000002 HC RX W HCPCS: Performed by: EMERGENCY MEDICINE

## 2021-09-15 PROCEDURE — 96375 TX/PRO/DX INJ NEW DRUG ADDON: CPT

## 2021-09-15 PROCEDURE — 96374 THER/PROPH/DIAG INJ IV PUSH: CPT

## 2021-09-15 PROCEDURE — 2580000003 HC RX 258: Performed by: EMERGENCY MEDICINE

## 2021-09-15 PROCEDURE — 2580000003 HC RX 258: Performed by: NURSE PRACTITIONER

## 2021-09-15 PROCEDURE — 87635 SARS-COV-2 COVID-19 AMP PRB: CPT

## 2021-09-15 PROCEDURE — 2060000000 HC ICU INTERMEDIATE R&B

## 2021-09-15 RX ORDER — FENTANYL CITRATE 50 UG/ML
25 INJECTION, SOLUTION INTRAMUSCULAR; INTRAVENOUS EVERY 30 MIN PRN
Status: DISCONTINUED | OUTPATIENT
Start: 2021-09-15 | End: 2021-09-15 | Stop reason: HOSPADM

## 2021-09-15 RX ORDER — MAGNESIUM SULFATE 1 G/100ML
1000 INJECTION INTRAVENOUS PRN
Status: DISCONTINUED | OUTPATIENT
Start: 2021-09-15 | End: 2021-09-17 | Stop reason: HOSPADM

## 2021-09-15 RX ORDER — 0.9 % SODIUM CHLORIDE 0.9 %
70 INTRAVENOUS SOLUTION INTRAVENOUS ONCE
Status: COMPLETED | OUTPATIENT
Start: 2021-09-15 | End: 2021-09-15

## 2021-09-15 RX ORDER — ONDANSETRON 4 MG/1
4 TABLET, ORALLY DISINTEGRATING ORAL EVERY 8 HOURS PRN
Status: DISCONTINUED | OUTPATIENT
Start: 2021-09-15 | End: 2021-09-17 | Stop reason: HOSPADM

## 2021-09-15 RX ORDER — SODIUM CHLORIDE 9 MG/ML
25 INJECTION, SOLUTION INTRAVENOUS PRN
Status: DISCONTINUED | OUTPATIENT
Start: 2021-09-15 | End: 2021-09-17 | Stop reason: HOSPADM

## 2021-09-15 RX ORDER — SODIUM CHLORIDE 0.9 % (FLUSH) 0.9 %
10 SYRINGE (ML) INJECTION PRN
Status: DISCONTINUED | OUTPATIENT
Start: 2021-09-15 | End: 2021-09-17 | Stop reason: HOSPADM

## 2021-09-15 RX ORDER — ONDANSETRON 2 MG/ML
4 INJECTION INTRAMUSCULAR; INTRAVENOUS EVERY 6 HOURS PRN
Status: DISCONTINUED | OUTPATIENT
Start: 2021-09-15 | End: 2021-09-17 | Stop reason: HOSPADM

## 2021-09-15 RX ORDER — SODIUM CHLORIDE 9 MG/ML
1000 INJECTION, SOLUTION INTRAVENOUS CONTINUOUS
Status: DISCONTINUED | OUTPATIENT
Start: 2021-09-15 | End: 2021-09-15 | Stop reason: HOSPADM

## 2021-09-15 RX ORDER — SODIUM CHLORIDE 0.9 % (FLUSH) 0.9 %
5-40 SYRINGE (ML) INJECTION EVERY 12 HOURS SCHEDULED
Status: DISCONTINUED | OUTPATIENT
Start: 2021-09-15 | End: 2021-09-17 | Stop reason: HOSPADM

## 2021-09-15 RX ORDER — HYDROCODONE BITARTRATE AND ACETAMINOPHEN 5; 325 MG/1; MG/1
2 TABLET ORAL EVERY 4 HOURS PRN
Status: DISCONTINUED | OUTPATIENT
Start: 2021-09-15 | End: 2021-09-17

## 2021-09-15 RX ORDER — ACETAMINOPHEN 650 MG/1
650 SUPPOSITORY RECTAL EVERY 6 HOURS PRN
Status: DISCONTINUED | OUTPATIENT
Start: 2021-09-15 | End: 2021-09-17 | Stop reason: HOSPADM

## 2021-09-15 RX ORDER — POTASSIUM CHLORIDE 7.45 MG/ML
10 INJECTION INTRAVENOUS PRN
Status: DISCONTINUED | OUTPATIENT
Start: 2021-09-15 | End: 2021-09-17 | Stop reason: HOSPADM

## 2021-09-15 RX ORDER — POTASSIUM CHLORIDE 20 MEQ/1
40 TABLET, EXTENDED RELEASE ORAL PRN
Status: DISCONTINUED | OUTPATIENT
Start: 2021-09-15 | End: 2021-09-17 | Stop reason: HOSPADM

## 2021-09-15 RX ORDER — POLYETHYLENE GLYCOL 3350 17 G/17G
17 POWDER, FOR SOLUTION ORAL DAILY PRN
Status: DISCONTINUED | OUTPATIENT
Start: 2021-09-15 | End: 2021-09-17 | Stop reason: HOSPADM

## 2021-09-15 RX ORDER — SODIUM CHLORIDE 0.9 % (FLUSH) 0.9 %
10 SYRINGE (ML) INJECTION PRN
Status: DISCONTINUED | OUTPATIENT
Start: 2021-09-15 | End: 2021-09-15 | Stop reason: HOSPADM

## 2021-09-15 RX ORDER — ACETAMINOPHEN 325 MG/1
650 TABLET ORAL EVERY 6 HOURS PRN
Status: DISCONTINUED | OUTPATIENT
Start: 2021-09-15 | End: 2021-09-17 | Stop reason: HOSPADM

## 2021-09-15 RX ORDER — FENTANYL CITRATE 50 UG/ML
25 INJECTION, SOLUTION INTRAMUSCULAR; INTRAVENOUS
Status: DISCONTINUED | OUTPATIENT
Start: 2021-09-15 | End: 2021-09-17 | Stop reason: HOSPADM

## 2021-09-15 RX ORDER — HYDROCODONE BITARTRATE AND ACETAMINOPHEN 5; 325 MG/1; MG/1
1 TABLET ORAL EVERY 4 HOURS PRN
Status: DISCONTINUED | OUTPATIENT
Start: 2021-09-15 | End: 2021-09-17

## 2021-09-15 RX ORDER — ONDANSETRON 2 MG/ML
4 INJECTION INTRAMUSCULAR; INTRAVENOUS ONCE
Status: COMPLETED | OUTPATIENT
Start: 2021-09-15 | End: 2021-09-15

## 2021-09-15 RX ORDER — FAMOTIDINE 20 MG/1
20 TABLET, FILM COATED ORAL 2 TIMES DAILY
Status: DISCONTINUED | OUTPATIENT
Start: 2021-09-15 | End: 2021-09-17 | Stop reason: HOSPADM

## 2021-09-15 RX ADMIN — FAMOTIDINE 20 MG: 20 TABLET ORAL at 21:03

## 2021-09-15 RX ADMIN — Medication 10 ML: at 09:51

## 2021-09-15 RX ADMIN — IOPAMIDOL 75 ML: 755 INJECTION, SOLUTION INTRAVENOUS at 09:48

## 2021-09-15 RX ADMIN — FENTANYL CITRATE 25 MCG: 50 INJECTION, SOLUTION INTRAMUSCULAR; INTRAVENOUS at 17:09

## 2021-09-15 RX ADMIN — ENOXAPARIN SODIUM 40 MG: 40 INJECTION SUBCUTANEOUS at 21:12

## 2021-09-15 RX ADMIN — FENTANYL CITRATE 25 MCG: 50 INJECTION, SOLUTION INTRAMUSCULAR; INTRAVENOUS at 14:05

## 2021-09-15 RX ADMIN — FENTANYL CITRATE 25 MCG: 50 INJECTION, SOLUTION INTRAMUSCULAR; INTRAVENOUS at 08:25

## 2021-09-15 RX ADMIN — ONDANSETRON 4 MG: 2 INJECTION INTRAMUSCULAR; INTRAVENOUS at 08:25

## 2021-09-15 RX ADMIN — SODIUM CHLORIDE 70 ML: 9 INJECTION, SOLUTION INTRAVENOUS at 09:51

## 2021-09-15 RX ADMIN — SODIUM CHLORIDE, PRESERVATIVE FREE 10 ML: 5 INJECTION INTRAVENOUS at 21:13

## 2021-09-15 RX ADMIN — SODIUM CHLORIDE 1000 ML: 9 INJECTION, SOLUTION INTRAVENOUS at 08:24

## 2021-09-15 RX ADMIN — HYDROCODONE BITARTRATE AND ACETAMINOPHEN 1 TABLET: 5; 325 TABLET ORAL at 21:02

## 2021-09-15 ASSESSMENT — PAIN DESCRIPTION - PROGRESSION
CLINICAL_PROGRESSION: OTHER (COMMENT)
CLINICAL_PROGRESSION: GRADUALLY IMPROVING

## 2021-09-15 ASSESSMENT — PAIN SCALES - GENERAL
PAINLEVEL_OUTOF10: 0
PAINLEVEL_OUTOF10: 8
PAINLEVEL_OUTOF10: 8
PAINLEVEL_OUTOF10: 4
PAINLEVEL_OUTOF10: 9

## 2021-09-15 ASSESSMENT — PAIN DESCRIPTION - PAIN TYPE
TYPE: ACUTE PAIN

## 2021-09-15 ASSESSMENT — PAIN DESCRIPTION - ORIENTATION
ORIENTATION: LEFT

## 2021-09-15 ASSESSMENT — PAIN DESCRIPTION - LOCATION
LOCATION: HIP

## 2021-09-15 ASSESSMENT — ENCOUNTER SYMPTOMS
GASTROINTESTINAL NEGATIVE: 1
EYES NEGATIVE: 1
ALLERGIC/IMMUNOLOGIC NEGATIVE: 1
RESPIRATORY NEGATIVE: 1

## 2021-09-15 ASSESSMENT — PAIN - FUNCTIONAL ASSESSMENT: PAIN_FUNCTIONAL_ASSESSMENT: ACTIVITIES ARE NOT PREVENTED

## 2021-09-15 ASSESSMENT — PAIN DESCRIPTION - DESCRIPTORS: DESCRIPTORS: SHARP

## 2021-09-15 ASSESSMENT — PAIN DESCRIPTION - ONSET: ONSET: ON-GOING

## 2021-09-15 ASSESSMENT — PAIN DESCRIPTION - FREQUENCY: FREQUENCY: CONTINUOUS

## 2021-09-15 NOTE — ED NOTES
[place] NP return page, speaking with Dr. Benitez Cortes.        Frank Feliz, MANSOOR  09/15/21 [id number]

## 2021-09-15 NOTE — ED NOTES
Pt called out to urinate. External female urine collection device applied and connected to suction.        Jayson Mcmanus RN  09/15/21 2020

## 2021-09-15 NOTE — ED NOTES
Fani Yeh NP return page, speaking via telephone with Dr. Bibi Ribera.         Jarod Owen RN  09/15/21 7319

## 2021-09-15 NOTE — ED NOTES
Berenice García RN [facility] hospitalist again per Dr. Marilia Chavez request.        Guilford Fleischer, RN  09/15/21 0501

## 2021-09-15 NOTE — ED NOTES
Dr. Roldan Post to bedside with update on plan of care. Sons at bedside.         Lynette Melo RN  09/15/21 [id number]

## 2021-09-15 NOTE — ED NOTES
Pt son request CT abdomen pelvis with contrast, stating pt was scheduled for that to rule out bladder CA but had to reschedule. Okay per Dr. Indra Schmitt to hold off on pelvis CT w/o contrast, awaiting kidney function labs and then will add contrast at that time. Pt oxygen saturation 91% on room air after receiving IV Fentanyl. 2L oxygen applied via NC, pt oxygen saturation 100%.        Pratik Ivey RN  09/15/21 [id number]

## 2021-09-15 NOTE — ED PROVIDER NOTES
eMERGENCY dEPARTMENT eNCOUnter      Pt Name: Dangelo Ribeiro  MRN: [id number]  [place] 9/23/1932  Date of evaluation: 9/15/2021      CHIEF COMPLAINT       Chief Complaint   Patient presents with    Fall    Hip Pain     left hip          HISTORY OF PRESENT ILLNESS    Dangelo Ribeiro is a 80 y.o. female who presents to the emergency department status post a fall at her nursing facility patient did hit her head and has some left hip pain. No obvious deformity is noted there is no X internal rotation. Patient does not have any loss of consciousness has minor head pain at this point time no neck pain. There was no neurologic deficit. The patient is awake but slow to respond which is her norm. REVIEW OF SYSTEMS     Review of Systems   Constitutional: Negative. HENT: Negative. Eyes: Negative. Respiratory: Negative. Cardiovascular: Negative. Gastrointestinal: Negative. Endocrine: Negative. Genitourinary: Negative. Musculoskeletal: Positive for arthralgias. Skin: Negative. Allergic/Immunologic: Negative. Neurological: Negative. Hematological: Negative. Psychiatric/Behavioral: Negative. 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 SURGICAL HISTORY      has a past surgical history that includes other surgical history (02/05/2013); Small intestine surgery (08/21/2010); other surgical history (08/18/2010); Cystoscopy (08/18/2010); Colonoscopy (06/08/2010); Abdominal exploration surgery (04/02/2010); Cholecystectomy, laparoscopic (08/18/1997); Umbilical hernia repair (08/18/1997); Dilation and curettage of uterus (06/07/1985); Dilation and curettage of uterus (12/23/1983); Colposcopy (Remote); Cataract removal; Yag capsulotomy; and colectomy (06/2021).     CURRENT MEDICATIONS       Discharge Medication List as of 9/15/2021  6:41 PM      CONTINUE these medications which have NOT CHANGED    Details   pantoprazole (PROTONIX) 40 MG tablet Take 40 mg by mouth dailyHistorical Med      spironolactone (ALDACTONE) 25 MG tablet Take 25 mg by mouth dailyHistorical Med      metoprolol succinate (TOPROL XL) 100 MG extended release tablet Take 100 mg by mouth daily Pt takes 25mg dailyHistorical Med      furosemide (LASIX) 20 MG tablet Take 20 mg by mouth 2 times daily Only takes on Tues, Thur and SatHistorical Med      metoclopramide (REGLAN) 10 MG tablet Take 10 mg by mouth 2 times daily (before meals)Historical Med      ferrous sulfate (IRON 325) 325 (65 Fe) MG tablet Take 325 mg by mouth daily (with breakfast) Historical Med      PARoxetine (PAXIL) 20 MG tablet TAKE ONE TABLET BY MOUTH DAILY, Disp-90 tablet, R-3Normal      amLODIPine (NORVASC) 5 MG tablet Take 5 mg by mouth dailyHistorical Med      !! Misc. Devices (WALKER) MISC Disp-1 each, R-0, PrintRolling walker with seat      cloNIDine (CATAPRES) 0.2 MG tablet TAKE ONE TABLET BY MOUTH TWICE A DAY, Disp-180 tablet, R-2Normal      simvastatin (ZOCOR) 10 MG tablet TAKE ONE TABLET BY MOUTH EVERY EVENING, Disp-90 tablet, R-3Normal      levothyroxine (SYNTHROID) 50 MCG tablet TAKE ONE TABLET BY MOUTH DAILY, Disp-90 tablet, R-3Normal      !! Misc. Devices MISC Disp-1 Device, R-0, PrintHospital bed   DX: cervical fracture with delayed healing      fluticasone (FLONASE) 50 MCG/ACT nasal spray 1 spray each nostril daily. , Disp-3 Bottle, R-3Normal      !! Misc.  Devices (BATH BENCH WITH BACK) [facility] 1 Device by Does not apply route 2 times daily, Disp-1 each, R-0Normal      alendronate (FOSAMAX) 70 MG tablet TAKE 1 TABLET BY MOUTH EVERY 7 DAYS AS DIRECTED, Disp-12 tablet, R-3Normal      acetaminophen (TYLENOL) 325 MG tablet Take 2 tablets by mouth every 4 hours as needed for Pain or Fever, Disp-120 tablet, R-0OTC      calcium carbonate-vitamin D (CALTRATE 600+D) 600-400 MG-UNIT TABS per tab Take 1 tablet by mouth 2 times daily. Multiple Vitamins-Minerals (MULTIVITAMIN PO) Take 1 tablet by mouth daily. aspirin 81 MG tablet Take 81 mg by mouth daily. !! - Potential duplicate medications found. Please discuss with provider. ALLERGIES     is allergic to bactrim [sulfamethoxazole-trimethoprim] and flagyl [metronidazole]. FAMILY HISTORY     She indicated that the status of her mother is unknown. She indicated that the status of her sister is unknown. She indicated that the status of her brother is unknown. She indicated that the status of her neg hx is unknown.     family history includes Cancer in her brother, mother, and sister. SOCIAL HISTORY      reports that she has never smoked. She has never used smokeless tobacco. She reports that she does not drink alcohol and does not use drugs. PHYSICAL EXAM     INITIAL VITALS:  weight is 99 lb (44.9 kg). Her oral temperature is 97.8 °F (36.6 °C). Her blood pressure is 133/75 and her pulse is 84. Her respiration is 18 and oxygen saturation is 100%. Constitutional: Alert, oriented x3, nontoxic, afebrile, answering questions appropriately, acting properly for age, in no acute distress  HEENT: Extraocular muscles intact, mucus membranes moist, TMs clear bilaterally, no posterior pharyngeal erythema or exudates, Pupils equal, round, reactive to light,   Neck: Trachea midline, Supple without lymphadenopathy, no posterior midline neck tenderness to palpation  Cardiovascular: Regular rhythm and rate no S3, S4, or murmurs  Respiratory: Clear to auscultation bilaterally no wheezes, rhonchi, rales, no respiratory distress  Gastrointestinal: Soft, nontender, nondistended, positive bowel sounds. No rebound, rigidity, or guarding.   Musculoskeletal: No extremity pain or swelling  Neurologic: Moving all 4 extremities without difficulty there are no gross focal neurologic deficits  Skin: Warm and dry      DIFFERENTIAL DIAGNOSIS/ MDM:     Patient will get a CT scan of her head just to make sure there is no intracranial pathology, she also get an x-ray of her left hip to make sure that there is no fracture. DIAGNOSTIC RESULTS     EKG: All EKG's are interpreted by the Emergency Department Physician who either signs or Co-signs this chart in the absence of a cardiologist.        Not indicated unless otherwise documented above    LABS:  Results for orders placed or performed during the hospital encounter of 09/15/21   COVID-19, Rapid    Specimen: Nasopharyngeal Swab   Result Value Ref Range    Specimen Description . NASOPHARYNGEAL SWAB     SARS-CoV-2, Rapid Not Detected Not Detected   CBC Auto Differential   Result Value Ref Range    WBC 15.3 (H) 3.5 - 11.0 k/uL    RBC 4.18 4.0 - 5.2 m/uL    Hemoglobin 12.6 12.0 - 16.0 g/dL    Hematocrit 37.9 36 - 46 %    MCV 90.6 80 - 100 fL    MCH 30.2 26 - 34 pg    MCHC 33.3 31 - 37 g/dL    RDW 14.8 12.5 - 15.4 %    Platelets 821 863 - 040 k/uL    MPV 8.4 6.0 - 12.0 fL    NRBC Automated NOT REPORTED per 100 WBC    Differential Type NOT REPORTED     Seg Neutrophils 88 (H) 36 - 66 %    Lymphocytes 7 (L) 24 - 44 %    Monocytes 5 2 - 11 %    Eosinophils % 0 (L) 1 - 4 %    Basophils 0 0 - 2 %    Immature Granulocytes NOT REPORTED 0 %    Segs Absolute 13.50 (H) 1.8 - 7.7 k/uL    Absolute Lymph # 1.00 1.0 - 4.8 k/uL    Absolute Mono # 0.80 0.1 - 1.2 k/uL    Absolute Eos # 0.00 0.0 - 0.4 k/uL    Basophils Absolute 0.00 0.0 - 0.2 k/uL    Absolute Immature Granulocyte NOT REPORTED 0.00 - 0.30 k/uL    WBC Morphology NOT REPORTED     RBC Morphology NOT REPORTED     Platelet Estimate NOT REPORTED    Comprehensive Metabolic Panel w/ Reflex to MG   Result Value Ref Range    Glucose 126 (H) 70 - 99 mg/dL    BUN 33 (H) 8 - 23 mg/dL    CREATININE 0.80 0.50 - 0.90 mg/dL    Bun/Cre Ratio NOT REPORTED 9 - 20    Calcium 9.4 8.6 - 10.4 mg/dL    Sodium 137 135 - 144 mmol/L    Potassium 3.9 3.7 - 5.3 mmol/L    Chloride 99 98 - 107 mmol/L    CO2 30 20 - 31 mmol/L    Anion Gap 8 (L) 9 - 17 mmol/L    Alkaline Phosphatase 85 35 - 104 U/L    ALT 10 5 - 33 U/L    AST 25 <32 U/L    Total Bilirubin 0.30 0.3 - 1.2 mg/dL    Total Protein 8.4 (H) 6.4 - 8.3 g/dL    Albumin 3.9 3.5 - 5.2 g/dL    Albumin/Globulin Ratio 0.9 (L) 1.0 - 2.5    GFR Non-African American >60 >60 mL/min    GFR African American >60 >60 mL/min    GFR Comment          GFR Staging NOT REPORTED    Urinalysis, reflex to microscopic   Result Value Ref Range    Color, UA YELLOW YELLOW    Turbidity UA CLEAR CLEAR    Glucose, Ur NEGATIVE NEGATIVE    Bilirubin Urine NEGATIVE NEGATIVE    Ketones, Urine NEGATIVE NEGATIVE    Specific Gravity, UA 1.020 1.005 - 1.030    Urine Hgb NEGATIVE NEGATIVE    pH, UA 7.5 5.0 - 8.0    Protein, UA NEGATIVE NEGATIVE    Urobilinogen, Urine Normal Normal    Nitrite, Urine NEGATIVE NEGATIVE    Leukocyte Esterase, Urine NEGATIVE NEGATIVE    Urinalysis Comments       Microscopic exam not performed based on chemical results unless requested in original order. Not indicated unless otherwise documented above    RADIOLOGY:   I reviewed the radiologist interpretations:  XR SHOULDER RIGHT (MIN 2 VIEWS)   Final Result   No acute bony abnormalities are noted         CT ABDOMEN PELVIS W IV CONTRAST Additional Contrast? None   Final Result   No visceral traumatic injury in the abdomen or pelvis. New left comminuted moderately displaced superior pubic ramus fracture   extending to the pubic body and nondisplaced inferior pubic ramus fracture. Comminuted essentially nondisplaced fracture of the left iliac wing extending   to the sacroiliac joint anteriorly. No pubic symphysis diastasis or widening   of the sacroiliac joints. Moderate regional left pelvic deep/muscular hematoma related to the   fractures. No contrast extravasation to indicate active hemorrhage. No free   pelvic fluid to indicate bladder rupture.          CT HEAD WO CONTRAST Final Result   1. No clear evidence for acute intracranial hemorrhage or midline shift. 2. Mild atrophy. Mild chronic small vessel ischemic white matter disease. 3. Atherosclerotic calcification of the parasellar carotid arteries. CT HEAD WO CONTRAST   Final Result      XR HIP LEFT (2-3 VIEWS)   Final Result   Moderately displaced acute mid to anterior left superior and inferior pubic   ramus fractures. Fracture extends to the symphysis. Old healed fractures of the right pubic rami. Postop changes in the pelvis. Not indicated unless otherwise documented above    EMERGENCY DEPARTMENT COURSE:     The patient was given the following medications:  Orders Placed This Encounter   Medications    DISCONTD: 0.9 % sodium chloride infusion    ondansetron (ZOFRAN) injection 4 mg    DISCONTD: fentaNYL (SUBLIMAZE) injection 25 mcg    DISCONTD: sodium chloride flush 0.9 % injection 10 mL    0.9 % sodium chloride bolus    iopamidol (ISOVUE-370) 76 % injection 75 mL        Vitals:    Vitals:    09/15/21 0512 09/15/21 1157 09/15/21 1810   BP: (!) 149/89  133/75   Pulse: 75 80 84   Resp: 17 16 18   Temp: 97.8 °F (36.6 °C)     TempSrc: Oral     SpO2: 98% 100% 100%   Weight: 99 lb (44.9 kg)       -------------------------  /75   Pulse 84   Temp 97.8 °F (36.6 °C) (Oral)   Resp 18   Wt 99 lb (44.9 kg)   SpO2 100%   BMI 16.47 kg/m²         I have reviewed the disposition diagnosis with the patient and or their family/guardian. I have answered their questions and given discharge instructions. They voiced understanding of these instructions and did not have any further questions or complaints. CRITICAL CARE:    None    CONSULTS:    None    PROCEDURES:    None      OARRS Report if indicated         Signed out to Dr. Melissa Stuart for further disposition    FINAL IMPRESSION      1.  Multiple closed fractures of pelvis without disruption of pelvic ring, initial encounter ([facility] [address] DISPOSITION/PLAN   DISPOSITION Decision To Transfer      PATIENT REFERRED TO:  No follow-up provider specified.     DISCHARGE MEDICATIONS:  Discharge Medication List as of 9/15/2021  6:41 PM          (Please note that portions of this note were completed with a voice recognition program.  Efforts were made to edit the dictations but occasionally words are mis-transcribed.)    Antonio Sanchez MD,, MD  Attending Emergency Physician            Antonio Sanchez MD  09/19/21 2718

## 2021-09-15 NOTE — ED NOTES
Spoke with Summer RN at [facility], relayed that ortho has been paged multiple times and that this RN called office directly with no answer. Relayed that voicemail left for house supervisor. Calling for advisement on how to proceed. Summer states she will see what she can find out and call back.         Monique Hurst RN  09/15/21 0743

## 2021-09-15 NOTE — ED NOTES
Calling [facility] for update on ortho decision to admit, has been 1 hour since ortho was paged.        Tameka Pino RN  09/15/21 0076

## 2021-09-15 NOTE — ED NOTES
Annabel Cade from [facility] office states they are Andreia gray working on it and will reach out to you shortly. \"     Michael Alamo, MANSOOR  09/15/21 9956

## 2021-09-15 NOTE — ED NOTES
Pt and family updated on room assignment and ETA of transport. RN offered snacks and pain medication, pt declined at this time. Pt denies further needs. Will continue to monitor.      Karol Clemente RN  09/15/21 0092

## 2021-09-15 NOTE — ED NOTES
Pt return from CT, stable condition. Pulse oximetry and urinary collection suction resumed.        Debbie Gómez RN  09/15/21 0138

## 2021-09-15 NOTE — ED NOTES
HOB raised per pt request.  Pt states she is sore and in pain from fall, pt medicated as documented in eMAR. Pt denies further needs. Pt and son updated on waiting for ortho return call, verbalized understanding.         Kevin Torres RN  09/15/21 3660

## 2021-09-15 NOTE — ED NOTES
This RN calling [facility] office directly, 2 hours since initial page.        Ayana Baker RN  09/15/21 2764

## 2021-09-15 NOTE — ED NOTES
Mode of arrival (squad #, walk in, police, etc) : medic 59        Chief complaint(s): fall; left hip pain        Arrival Note (brief scenario, treatment PTA, etc). : Per EMS pt was walking when she tripped and fell. Pt denies LOC. Pt reports left hip pain. Pt reports inability to move in cot due to pain. Sons at bedside. C= \"Have you ever felt that you should Cut down on your drinking? \"  No  A= \"Have people Annoyed you by criticizing your drinking? \"  No  G= \"Have you ever felt bad or Guilty about your drinking? \"  No  E= \"Have you ever had a drink as an Eye-opener first thing in the morning to steady your nerves or to help a hangover? \"  No      Deferred []      Reason for deferring: N/A    *If yes to two or more: probable alcohol abuse. Callum Berrios RN  09/15/21 [address] MANSOOR Hagen  09/15/21 9563

## 2021-09-15 NOTE — ED NOTES
Pt asleep with RR even and unlabored. Family updated with plan to page [facility] for admission, verbalized understanding.         Agustin De Santiago, RN  09/15/21 2164

## 2021-09-15 NOTE — ED PROVIDER NOTES
Patient's care was transferred to me at change of shift by Dr. Martin Franco who performed the initial evaluation. She fell in the early hours of this morning and x-rays of the pelvis revealed superior and inferior pubic ramus fractures with comminution. CT scan shows further extension of the fracture into the left ilium extending up to the SI joint on the left side. There is a muscle hematoma seen deep in the pelvis but no disruption of the bladder. The plan is to admit the patient and she has been accepted by Prasad Randall CNP on-call for her hospitalist group. Dr. Mar Mccabe was consulted for orthopedics and notified. Summation      Patient Course: Transferred    ED Medications administered this visit:    Medications   ondansetron (ZOFRAN) injection 4 mg (4 mg IntraVENous Given 9/15/21 0825)   0.9 % sodium chloride bolus (0 mLs IntraVENous Stopped 9/15/21 0952)   iopamidol (ISOVUE-370) 76 % injection 75 mL (75 mLs IntraVENous Given 9/15/21 0948)       New Prescriptions from this visit:    Discharge Medication List as of 9/15/2021  6:41 PM          Follow-up:  No follow-up provider specified. Final Impression:   1.  Multiple closed fractures of pelvis without disruption of pelvic ring, initial encounter ([facility] [address]               (Please note that portions of this note were completed with a voice recognition program.  Efforts were made to edit the dictations but occasionally words are mis-transcribed.)     Estefany Gomez MD  09/15/21 [address] Cassia MORE MD  09/19/21 1833

## 2021-09-15 NOTE — ED NOTES
Pt given ice water per request.   Son updated on multiple pages to ortho, verbalized understanding and denies further needs.         Hennie Ganser, RN  09/15/21 8122

## 2021-09-15 NOTE — ED NOTES
Charity Doll RN at [facility] page will be escalated d/t 3 pages without answer.        Sis Dixon RN  09/15/21 8227

## 2021-09-15 NOTE — PROGRESS NOTES
Pt a direct admit from [facility] ED. Pt alert and oriented. Pt oriented to call light system and agreeable to call for assistance.  Vital signs obtained; pt denies pain at this time; periwick/purewick in place; care of pt turned over to night shift RN [facility]

## 2021-09-15 NOTE — ED NOTES
[facility] has no beds available.   Writer calling [address] to [facility] hospitalist for admission per Dr. Hansen Charter request.       Ben Tam RN  09/15/21 0339

## 2021-09-16 PROBLEM — S32.592A CLOSED FRACTURE OF MULTIPLE PUBIC RAMI, LEFT, INITIAL ENCOUNTER (HCC): Status: ACTIVE | Noted: 2021-09-16

## 2021-09-16 PROBLEM — S32.302A: Status: ACTIVE | Noted: 2021-09-16

## 2021-09-16 LAB
ANION GAP SERPL CALCULATED.3IONS-SCNC: 12 MMOL/L (ref 9–17)
BUN BLDV-MCNC: 24 MG/DL (ref 8–23)
BUN/CREAT BLD: ABNORMAL (ref 9–20)
CALCIUM SERPL-MCNC: 9.7 MG/DL (ref 8.6–10.4)
CHLORIDE BLD-SCNC: 97 MMOL/L (ref 98–107)
CO2: 25 MMOL/L (ref 20–31)
CREAT SERPL-MCNC: 0.91 MG/DL (ref 0.5–0.9)
GFR AFRICAN AMERICAN: >60 ML/MIN
GFR NON-AFRICAN AMERICAN: 58 ML/MIN
GFR SERPL CREATININE-BSD FRML MDRD: ABNORMAL ML/MIN/{1.73_M2}
GFR SERPL CREATININE-BSD FRML MDRD: ABNORMAL ML/MIN/{1.73_M2}
GLUCOSE BLD-MCNC: 120 MG/DL (ref 70–99)
HCT VFR BLD CALC: 40.2 % (ref 36–46)
HEMOGLOBIN: 13.3 G/DL (ref 12–16)
INR BLD: 1
MCH RBC QN AUTO: 30 PG (ref 26–34)
MCHC RBC AUTO-ENTMCNC: 33.1 G/DL (ref 31–37)
MCV RBC AUTO: 90.6 FL (ref 80–100)
NRBC AUTOMATED: NORMAL PER 100 WBC
PDW BLD-RTO: 15.1 % (ref 12.5–15.4)
PLATELET # BLD: 259 K/UL (ref 140–450)
PMV BLD AUTO: 9.1 FL (ref 6–12)
POTASSIUM SERPL-SCNC: 4.5 MMOL/L (ref 3.7–5.3)
PROTHROMBIN TIME: 10.3 SEC (ref 9.4–12.6)
RBC # BLD: 4.43 M/UL (ref 4–5.2)
SODIUM BLD-SCNC: 134 MMOL/L (ref 135–144)
WBC # BLD: 10.2 K/UL (ref 3.5–11)

## 2021-09-16 PROCEDURE — 85027 COMPLETE CBC AUTOMATED: CPT

## 2021-09-16 PROCEDURE — 6370000000 HC RX 637 (ALT 250 FOR IP): Performed by: NURSE PRACTITIONER

## 2021-09-16 PROCEDURE — 6370000000 HC RX 637 (ALT 250 FOR IP): Performed by: INTERNAL MEDICINE

## 2021-09-16 PROCEDURE — 2580000003 HC RX 258: Performed by: INTERNAL MEDICINE

## 2021-09-16 PROCEDURE — 99223 1ST HOSP IP/OBS HIGH 75: CPT | Performed by: INTERNAL MEDICINE

## 2021-09-16 PROCEDURE — 97535 SELF CARE MNGMENT TRAINING: CPT

## 2021-09-16 PROCEDURE — 99497 ADVNCD CARE PLAN 30 MIN: CPT | Performed by: INTERNAL MEDICINE

## 2021-09-16 PROCEDURE — 6360000002 HC RX W HCPCS: Performed by: NURSE PRACTITIONER

## 2021-09-16 PROCEDURE — 99223 1ST HOSP IP/OBS HIGH 75: CPT | Performed by: ORTHOPAEDIC SURGERY

## 2021-09-16 PROCEDURE — 97530 THERAPEUTIC ACTIVITIES: CPT

## 2021-09-16 PROCEDURE — 80048 BASIC METABOLIC PNL TOTAL CA: CPT

## 2021-09-16 PROCEDURE — 97162 PT EVAL MOD COMPLEX 30 MIN: CPT

## 2021-09-16 PROCEDURE — 2580000003 HC RX 258: Performed by: NURSE PRACTITIONER

## 2021-09-16 PROCEDURE — 97166 OT EVAL MOD COMPLEX 45 MIN: CPT

## 2021-09-16 PROCEDURE — 36415 COLL VENOUS BLD VENIPUNCTURE: CPT

## 2021-09-16 PROCEDURE — 2060000000 HC ICU INTERMEDIATE R&B

## 2021-09-16 PROCEDURE — 85610 PROTHROMBIN TIME: CPT

## 2021-09-16 RX ORDER — FLUTICASONE PROPIONATE 50 MCG
1 SPRAY, SUSPENSION (ML) NASAL DAILY
Status: DISCONTINUED | OUTPATIENT
Start: 2021-09-16 | End: 2021-09-17 | Stop reason: HOSPADM

## 2021-09-16 RX ORDER — PAROXETINE HYDROCHLORIDE 20 MG/1
20 TABLET, FILM COATED ORAL NIGHTLY
Status: DISCONTINUED | OUTPATIENT
Start: 2021-09-16 | End: 2021-09-17 | Stop reason: HOSPADM

## 2021-09-16 RX ORDER — AMLODIPINE BESYLATE 5 MG/1
5 TABLET ORAL DAILY
Status: DISCONTINUED | OUTPATIENT
Start: 2021-09-16 | End: 2021-09-17 | Stop reason: HOSPADM

## 2021-09-16 RX ORDER — LEVOTHYROXINE SODIUM 0.05 MG/1
50 TABLET ORAL DAILY
Status: DISCONTINUED | OUTPATIENT
Start: 2021-09-16 | End: 2021-09-17 | Stop reason: HOSPADM

## 2021-09-16 RX ORDER — PREDNISONE 10 MG/1
10 TABLET ORAL DAILY
COMMUNITY

## 2021-09-16 RX ORDER — METOPROLOL SUCCINATE 25 MG/1
25 TABLET, EXTENDED RELEASE ORAL DAILY
COMMUNITY

## 2021-09-16 RX ORDER — PREDNISONE 10 MG/1
10 TABLET ORAL DAILY
Status: DISCONTINUED | OUTPATIENT
Start: 2021-09-16 | End: 2021-09-17 | Stop reason: HOSPADM

## 2021-09-16 RX ORDER — PYRIDOXINE HCL (VITAMIN B6) 100 MG
100 TABLET ORAL DAILY
COMMUNITY

## 2021-09-16 RX ORDER — MIRTAZAPINE 15 MG/1
7.5 TABLET, FILM COATED ORAL NIGHTLY
Status: DISCONTINUED | OUTPATIENT
Start: 2021-09-16 | End: 2021-09-17 | Stop reason: HOSPADM

## 2021-09-16 RX ORDER — CHOLECALCIFEROL (VITAMIN D3) 125 MCG
500 CAPSULE ORAL DAILY
COMMUNITY

## 2021-09-16 RX ORDER — VITAMIN E 268 MG
400 CAPSULE ORAL DAILY
COMMUNITY

## 2021-09-16 RX ORDER — ATORVASTATIN CALCIUM 10 MG/1
10 TABLET, FILM COATED ORAL DAILY
Refills: 3 | Status: DISCONTINUED | OUTPATIENT
Start: 2021-09-16 | End: 2021-09-17 | Stop reason: HOSPADM

## 2021-09-16 RX ORDER — SENNA AND DOCUSATE SODIUM 50; 8.6 MG/1; MG/1
2 TABLET, FILM COATED ORAL DAILY
Status: DISCONTINUED | OUTPATIENT
Start: 2021-09-16 | End: 2021-09-17 | Stop reason: HOSPADM

## 2021-09-16 RX ORDER — LANOLIN ALCOHOL/MO/W.PET/CERES
100 CREAM (GRAM) TOPICAL DAILY
Status: DISCONTINUED | OUTPATIENT
Start: 2021-09-16 | End: 2021-09-17 | Stop reason: HOSPADM

## 2021-09-16 RX ORDER — CLONIDINE HYDROCHLORIDE 0.3 MG/1
0.3 TABLET ORAL NIGHTLY
COMMUNITY

## 2021-09-16 RX ORDER — SODIUM CHLORIDE 9 MG/ML
INJECTION, SOLUTION INTRAVENOUS CONTINUOUS
Status: DISCONTINUED | OUTPATIENT
Start: 2021-09-16 | End: 2021-09-17

## 2021-09-16 RX ORDER — CLONIDINE HYDROCHLORIDE 0.1 MG/1
0.3 TABLET ORAL NIGHTLY
Status: DISCONTINUED | OUTPATIENT
Start: 2021-09-16 | End: 2021-09-17 | Stop reason: HOSPADM

## 2021-09-16 RX ORDER — CHOLECALCIFEROL (VITAMIN D3) 125 MCG
500 CAPSULE ORAL DAILY
Status: DISCONTINUED | OUTPATIENT
Start: 2021-09-16 | End: 2021-09-17 | Stop reason: HOSPADM

## 2021-09-16 RX ORDER — METOPROLOL SUCCINATE 25 MG/1
25 TABLET, EXTENDED RELEASE ORAL DAILY
Status: DISCONTINUED | OUTPATIENT
Start: 2021-09-16 | End: 2021-09-17 | Stop reason: HOSPADM

## 2021-09-16 RX ADMIN — SODIUM CHLORIDE, PRESERVATIVE FREE 10 ML: 5 INJECTION INTRAVENOUS at 08:15

## 2021-09-16 RX ADMIN — Medication 100 MG: at 14:10

## 2021-09-16 RX ADMIN — MIRTAZAPINE 7.5 MG: 15 TABLET, FILM COATED ORAL at 19:50

## 2021-09-16 RX ADMIN — CLONIDINE HYDROCHLORIDE 0.3 MG: 0.1 TABLET ORAL at 19:50

## 2021-09-16 RX ADMIN — METOPROLOL SUCCINATE 25 MG: 25 TABLET, EXTENDED RELEASE ORAL at 14:11

## 2021-09-16 RX ADMIN — CALCIUM CARBONATE 600 MG (1,500 MG)-VITAMIN D3 400 UNIT TABLET 1 TABLET: at 19:50

## 2021-09-16 RX ADMIN — FAMOTIDINE 20 MG: 20 TABLET ORAL at 08:14

## 2021-09-16 RX ADMIN — PREDNISONE 10 MG: 10 TABLET ORAL at 14:11

## 2021-09-16 RX ADMIN — CALCIUM CARBONATE 600 MG (1,500 MG)-VITAMIN D3 400 UNIT TABLET 1 TABLET: at 14:11

## 2021-09-16 RX ADMIN — HYDROCODONE BITARTRATE AND ACETAMINOPHEN 2 TABLET: 5; 325 TABLET ORAL at 05:59

## 2021-09-16 RX ADMIN — FAMOTIDINE 20 MG: 20 TABLET ORAL at 19:50

## 2021-09-16 RX ADMIN — PAROXETINE HYDROCHLORIDE 20 MG: 20 TABLET, FILM COATED ORAL at 19:50

## 2021-09-16 RX ADMIN — ENOXAPARIN SODIUM 40 MG: 40 INJECTION SUBCUTANEOUS at 08:14

## 2021-09-16 RX ADMIN — HYDROCODONE BITARTRATE AND ACETAMINOPHEN 2 TABLET: 5; 325 TABLET ORAL at 19:51

## 2021-09-16 RX ADMIN — CYANOCOBALAMIN TAB 500 MCG 500 MCG: 500 TAB at 14:11

## 2021-09-16 RX ADMIN — HYDROCODONE BITARTRATE AND ACETAMINOPHEN 1 TABLET: 5; 325 TABLET ORAL at 15:04

## 2021-09-16 RX ADMIN — ATORVASTATIN CALCIUM 10 MG: 10 TABLET, FILM COATED ORAL at 14:11

## 2021-09-16 RX ADMIN — DOCUSATE SODIUM 50MG AND SENNOSIDES 8.6MG 2 TABLET: 8.6; 5 TABLET, FILM COATED ORAL at 14:11

## 2021-09-16 RX ADMIN — SODIUM CHLORIDE: 9 INJECTION, SOLUTION INTRAVENOUS at 13:14

## 2021-09-16 RX ADMIN — AMLODIPINE BESYLATE 5 MG: 5 TABLET ORAL at 14:11

## 2021-09-16 RX ADMIN — Medication 400 UNITS: at 14:07

## 2021-09-16 RX ADMIN — FLUTICASONE PROPIONATE 1 SPRAY: 50 SPRAY, METERED NASAL at 14:14

## 2021-09-16 ASSESSMENT — PAIN SCALES - GENERAL
PAINLEVEL_OUTOF10: 0
PAINLEVEL_OUTOF10: 8
PAINLEVEL_OUTOF10: 0
PAINLEVEL_OUTOF10: 0

## 2021-09-16 ASSESSMENT — PAIN DESCRIPTION - ORIENTATION
ORIENTATION: LEFT

## 2021-09-16 ASSESSMENT — PAIN DESCRIPTION - PAIN TYPE
TYPE: ACUTE PAIN

## 2021-09-16 ASSESSMENT — PAIN DESCRIPTION - ONSET
ONSET: ON-GOING

## 2021-09-16 ASSESSMENT — PAIN DESCRIPTION - PROGRESSION
CLINICAL_PROGRESSION: GRADUALLY IMPROVING
CLINICAL_PROGRESSION: NOT CHANGED
CLINICAL_PROGRESSION: GRADUALLY IMPROVING

## 2021-09-16 ASSESSMENT — PAIN - FUNCTIONAL ASSESSMENT
PAIN_FUNCTIONAL_ASSESSMENT: PREVENTS OR INTERFERES SOME ACTIVE ACTIVITIES AND ADLS
PAIN_FUNCTIONAL_ASSESSMENT: ACTIVITIES ARE NOT PREVENTED
PAIN_FUNCTIONAL_ASSESSMENT: ACTIVITIES ARE NOT PREVENTED

## 2021-09-16 ASSESSMENT — PAIN DESCRIPTION - LOCATION
LOCATION: HIP

## 2021-09-16 ASSESSMENT — PAIN DESCRIPTION - DESCRIPTORS
DESCRIPTORS: SHARP
DESCRIPTORS: DISCOMFORT;ACHING
DESCRIPTORS: DISCOMFORT;ACHING

## 2021-09-16 ASSESSMENT — PAIN DESCRIPTION - FREQUENCY
FREQUENCY: CONTINUOUS

## 2021-09-16 NOTE — CONSULTS
Consults  Patient: Dangelo Ribeiro  Unit/Bed: 255/980-94  YOB: 1932  MRN: [id number]  Acct: [de-identified]   Admitting Diagnosis: Pathological fracture, pelvis, initial encounter for fracture [S83.381A]  Admit Date:  9/15/2021  Hospital Day: 1    Subjective:    Patient is having problems with left hip pain   HPI  Patient Seen, Chart, Labs, Radiologystudies, and Consults reviewed. Assisted living pt with ground level fall and left hip pain      Objective:   BP (!) 159/87   Pulse 103   Temp 97.9 °F (36.6 °C) (Oral)   Resp 13   Wt 101 lb 6.6 oz (46 kg)   SpO2 92%   BMI 16.88 kg/m²   Intake/Output Summary (Last 24 hours) at 9/16/2021 1212  Last data filed at 9/16/2021 0601  Gross per 24 hour   Intake 100 ml   Output 250 ml   Net -150 ml     Review of Systems  Physical Exam  Vitals and nursing note reviewed. Constitutional:       Appearance: She is well-developed. HENT:      Head: Normocephalic and atraumatic. Nose: Nose normal.   Eyes:      Conjunctiva/sclera: Conjunctivae normal.   Pulmonary:      Effort: Pulmonary effort is normal. No respiratory distress. Musculoskeletal:      Cervical back: Normal range of motion and neck supple. Comments:      Skin:     General: Skin is warm and dry. Neurological:      Mental Status: She is alert and oriented to person, place, and time. Sensory: No sensory deficit. Psychiatric:         Behavior: Behavior normal.         Thought Content:  Thought content normal.           Drains:No  Imaging:Yes see below      Medications:    mirtazapine  7.5 mg Oral Nightly    amLODIPine  5 mg Oral Daily    calcium carbonate-vitamin D  1 tablet Oral BID    cloNIDine  0.3 mg Oral Nightly    fluticasone  1 spray Each Nostril Daily    levothyroxine  1 tablet Oral Daily    metoprolol succinate  25 mg Oral Daily    PARoxetine  20 mg Oral Nightly    predniSONE  10 mg Oral Daily    pyridoxine  100 mg Oral Daily    atorvastatin  10 mg Oral Daily  vitamin B-12  500 mcg Oral Daily    vitamin E  400 Units Oral Daily    sodium chloride flush  5-40 mL IntraVENous 2 times per day    enoxaparin  40 mg SubCUTAneous Daily    famotidine  20 mg Oral BID     PRN Meds:sodium chloride flush, sodium chloride, potassium chloride **OR** potassium alternative oral replacement **OR** potassium chloride, magnesium sulfate, ondansetron **OR** ondansetron, polyethylene glycol, acetaminophen **OR** acetaminophen, HYDROcodone 5 mg - acetaminophen **OR** HYDROcodone 5 mg - acetaminophen, fentanNYL      Data:  CBC:   Recent Labs     09/15/21  0822 09/16/21  0601   WBC 15.3* 10.2   RBC 4.18 4.43   HGB 12.6 13.3   HCT 37.9 40.2   MCV 90.6 90.6   RDW 14.8 15.1    259     BNP: No results for input(s): BNP in the last 72 hours. PT/INR:   Recent Labs     09/16/21  0601   PROTIME 10.3   INR 1.0           CT -Left pubic S and I fractures with left iliac wing fracture and old healed right rami fractures      Assessment/Plan:  Active Problems:    Pathological fracture, pelvis, initial encounter for fracture    Closed fracture of multiple pubic rami, left, initial encounter ([facility])    Closed fracture of iliac crest, left, initial encounter ([facility] [address]  Resolved Problems:    * No resolved hospital problems.  *    PT  OT  Discharge planning  Follow up 2 weeks post discharge      Electronically signed by Eran Davis MD on 9/16/2021 at 12:12 PM    Rounding Hospitalist

## 2021-09-16 NOTE — PROGRESS NOTES
[employer]upational [employer]  Occupational Therapy Not Seen Note    DATE: 2021  Name: Dangelo Ribeiro  : [birth year]  MRN: [id number]    Patient not available for Occupational Therapy due to:    [x] Other: Pt admitted following fall, awaiting ortho consult/recommendations as xray indicates pelvic fracture. Next Scheduled Treatment: Will check back upon ortho recommendations.      Demarco Jacinto, OTR/L

## 2021-09-16 NOTE — PROGRESS NOTES
Physical Therapy    Facility/Department: [facility]  Initial Assessment    NAME: Zafar Buck  :   MRN: [id number]    Date of Service: 2021   Chief Complaint: fall with left hip pain      Discharge Recommendations:  Patient would benefit from continued therapy after discharge   PT Equipment Recommendations  Equipment Needed: No    Assessment   Body structures, Functions, Activity limitations: Decreased functional mobility ; Decreased endurance;Decreased strength;Decreased balance; Increased pain;Decreased safe awareness  Assessment: Pt presents with generalized weakness, increased pain, and mobility deficits with sit to stand transfers Mod A x 2 and pivot transfer with Mary Valdez. Pt unable to ambulate at this time. Pt will not be able to return to previous living environment due to extensive assistance needed for mobility and inability to ambulate. Pt will benefit from further therapy upon discharge. Treatment Diagnosis: dec mobility  Prognosis: Fair  Decision Making: Medium Complexity  PT Education: Goals;Transfer Training;PT Role;Functional Mobility Training;Plan of Care;General Safety;Gait Training  REQUIRES PT FOLLOW UP: Yes  Activity Tolerance  Activity Tolerance: Patient limited by endurance; Patient limited by fatigue;Patient limited by pain       Patient Diagnosis(es): There were no encounter diagnoses. has a past medical history of Anxiety neurosis, Cataract, History of peritonitis, Hyperlipidemia, Hypertension, Macular edema, Pelvis fracture ([address] Pseudophakos, and Renal insufficiency. has a past surgical history that includes other surgical history (2013); Small intestine surgery (2010); other surgical history (2010); Cystoscopy (2010); Colonoscopy (2010); Abdominal exploration surgery (2010); Cholecystectomy, laparoscopic (1997); Umbilical hernia repair (1997);  Dilation and curettage of uterus (1985); Dilation and curettage of uterus (12/23/1983); Colposcopy (Remote); Cataract removal; Yag capsulotomy; and colectomy (06/2021). Restrictions  Restrictions/Precautions  Restrictions/Precautions: Weight Bearing, General Precautions, Fall Risk  Required Braces or Orthoses?: No  Lower Extremity Weight Bearing Restrictions  Left Lower Extremity Weight Bearing: Weight Bearing As Tolerated  Position Activity Restriction  Other position/activity restrictions: LLE WBAT w/device per Dr. Atha Moritz orders. Vision/Hearing        Subjective  General  Patient assessed for rehabilitation services?: Yes  Family / Caregiver Present: No  Referring Practitioner: Alex  Referral Date : 09/16/21  Diagnosis: pathological fracture, pelvis  Follows Commands: Impaired (slow to respond to commands)  Subjective  Subjective: Pt supine in bed and agreeable to therapy. Pt slow to respond to questions and commands.   Pain Screening  Patient Currently in Pain: Yes  Pain Assessment  Pain Assessment: 0-10  Pain Type: Acute pain  Pain Location: Hip  Pain Orientation: Left  Vital Signs  Patient Currently in Pain: Yes       Orientation  Orientation  Overall Orientation Status: Within Functional Limits  Social/Functional History  Social/Functional History  Lives With: Spouse  Type of Home: Assisted living  Home Layout: One level  Home Access: Level entry  [employer]: Grab bars in shower, Grab bars around toilet, Built-in shower seat  Home Equipment: 4 wheeled walker  Receives Help From: Other (comment) (staff at facility)  ADL Assistance:  (pt reports requiring assistance with bathing, toileting, and dressing tasks)  Homemaking Assistance:  (pt reports facility performs all homemaking responsibilities)  Homemaking Responsibilities: No  Ambulation Assistance: Independent (3JF)  Transfer Assistance: Needs assistance  Active : No  Occupation: Retired  Additional Comments: Pt lives in [facility] with spouse; has staff assistance for bathing/dressing, meals provided, assistance on/off the toilet. Cognition   Cognition  Arousal/Alertness: Appropriate responses to stimuli  Following Commands: Follows one step commands with increased time  Attention Span: Appears intact  Safety Judgement: Decreased awareness of need for safety  Insights: Decreased awareness of deficits  Initiation: Requires cues for some  Sequencing: Requires cues for some    Objective          AROM RLE (degrees)  RLE AROM: WFL  AROM LLE (degrees)  LLE AROM : WFL  Strength RLE  Strength RLE: Exception  Comment: grossly 3+/5  Strength LLE  Strength LLE: Exception  Comment: grossly 3-/5        Bed mobility  Supine to Sit: Maximum assistance;2 Person assistance  Scooting: Maximal assistance  Comment: Head of bed elevated ~ 45 degrees; pt required assistance for all aspects of bed mobility. Transfers  Sit to Stand: Moderate Assistance;2 Person Assistance  Stand to sit: Moderate Assistance;2 Person Assistance  Stand Pivot Transfers: Dependent/Total  Comment: Pt stood edge of bed with RW with Mod assist x 2 ~15 seconds x 2 reps; pt able to lift either foot slightly off the floor but unable to initiate a step; pt transferred to chair via [employer].   Ambulation  Ambulation?: No  Stairs/Curb  Stairs?: No     Balance  Posture: Poor  Sitting - Static: Poor;+  Standing - Static: Poor  Standing - Dynamic: Poor  Comments: pt with (L) lateral lean in sitting; standing balance assessed with RW and Rustam [employer]        Plan   Plan  Times per week: 5-6x/week  Times per day: Daily  Current Treatment Recommendations: Strengthening, Transfer Training, Endurance Training, Patient/Caregiver Education & Training, Balance Training, Gait Training, Safety Education & Training, Functional Mobility Training  Safety Devices  Type of devices: Call light within reach, Left in chair, Chair alarm in place, Gait belt, Nurse notified, Patient at risk for falls  Restraints  Initially in place: No    G-Code       OutComes Score AM-PAC Score     AM-PAC Inpatient Mobility without Stair Climbing Raw Score : 8 (09/16/21 1551)  AM-PAC Inpatient without Stair Climbing T-Scale Score : 30.65 (09/16/21 1551)  Mobility Inpatient CMS 0-100% Score: 80.91 (09/16/21 1551)  Mobility Inpatient without Stair CMS G-Code Modifier : CM (09/16/21 1551)       Goals  Short term goals  Time Frame for Short term goals: 14 visits  Short term goal 1: Bed mobility with CGA with modifications as needed. Short term goal 2: Pt to perform supine and seated LE exercises x 15 reps to improve strength for mobility. Short term goal 3: Pt to transfer with Min-CGA with RW without LOB. Short term goal 4: Pt to ambulate with RW 50' x 1 CGA without LOB. Short term goal 5: Improve seated balance to Good; standing static/dynamic balance to Fair with device.   Patient Goals   Patient goals : none stated       Therapy Time   Individual Concurrent Group Co-treatment   Time In 1420         Time Out 1455         Minutes 35         Timed Code Treatment Minutes: Win 224, PT

## 2021-09-16 NOTE — PROGRESS NOTES
Occupational Therapy   Occupational Therapy Initial Assessment  Date: 2021   Patient Name: Angel Berrios  MRN: [id number]     : 1932    Date of Service: 2021  Pt admitted following a fall and c/o left hip pain. Imaging noted pelvic fractures. Discharge Recommendations:  Patient would benefit from continued therapy after discharge       Assessment   Performance deficits / Impairments: Decreased functional mobility ; Decreased safe awareness;Decreased balance;Decreased posture;Decreased ADL status; Decreased high-level IADLs;Decreased strength;Decreased ROM  Assessment: Pt admitted following fall with residual pelvic fractures, per Dr. Manjinder Canada orders pt is WBAT on LLE. Upon standing, pt's LLE noted with increased unsteadiness and reduced BLE step progression for functional mobility. pt would continue to benefit from skilled OT services during hospitalization and at discharge to maximize pt's improvement in above stated deficits. Pt would be unsafe to return to prior living arrangements without further therapy services and 24/ assistance to maximize pt's safety and independence to perform all functional activities. Prognosis: Fair  Decision Making: Medium Complexity  OT Education: OT Role;Plan of Care;Precautions; Equipment;Transfer Training  Patient Education: use of beena stedy - bed mobility  Barriers to Learning: reduced processing time  REQUIRES OT FOLLOW UP: Yes  Activity Tolerance  Activity Tolerance: Patient limited by pain  Activity Tolerance: LE unsteadiness  Safety Devices  Safety Devices in place: Yes  Type of devices: Call light within reach; Chair alarm in place; Left in chair;Nurse notified  Restraints  Initially in place: No           Patient Diagnosis(es): There were no encounter diagnoses. has a past medical history of Anxiety neurosis, Cataract, History of peritonitis, Hyperlipidemia, Hypertension, Macular edema, Pelvis fracture ([address] Pseudophakos, and Renal insufficiency. Equipment: Grab bars in shower, [facility]: 4 wheeled walker  Receives Help From: Other (comment) (staff at facility)  ADL Assistance:  (pt reports requiring assistance with bathing, toileting, and dressing tasks)  [address]:  (pt reports facility performs all homemaking responsibilities)  Homemaking Responsibilities: No  Ambulation Assistance: Independent (0RR)  Transfer Assistance: Needs assistance  Active : No  Occupation: Retired  Additional Comments: Pt lives in [address] with spouse; has staff assistance for bathing/dressing, meals provided, assistance on/off the toilet. Objective   Vision: Impaired  Vision Exceptions: Wears glasses for reading  Hearing: Exceptions to [facility]  Hearing Exceptions: Bilateral hearing aid      Orientation  Overall Orientation Status: Within Functional Limits     Balance  Sitting Balance: Moderate assistance (seated at EOB during screens prior to functional transfers; L lateral lean evident, Mod A w/VCs required to maintain midline seated balance)  Standing Balance: Minimal assistance (static standing at EOB w/Rw Min Ax2)  Standing Balance  Comment: Pt stood ~15-20 seconds x2 w/RW at EOB w/Min Ax2. Once standing, increased unsteadiness noted in LLE limiting step progression at this time. Functional Mobility  Functional Mobility Comments: Use of beena stedy, ModAx2, at this time d/t LLE pain and unsteadiness and reduced LE step progression once in standing. ADL  Feeding: Other (Comment); Setup; Increased time to complete;Bringing food to mouth assist (dysphagia diet (soft and bite sized))  Grooming: Increased time to complete;Minimal assistance  UE Bathing: Increased time to complete; Moderate assistance;Maximum assistance  LE Bathing: Increased time to complete; Moderate assistance;Maximum assistance  UE Dressing: Increased time to complete;Maximum assistance; Moderate assistance  LE Dressing: Increased time to complete;Maximum assistance; Moderate assistance  Toileting: Dependent/Total (pericare/bottom hygiene and donning of brief  performed supine in bed)     Tone RUE  RUE Tone: Normotonic  Tone LUE  LUE Tone: Normotonic  Quality of Movement Other  Comment: not formally assessed at this time        Bed mobility  Rolling to Left: Maximum assistance  Rolling to Right: Maximum assistance  Supine to Sit: 2 Person assistance;Maximum assistance  Sit to Supine:  (pt up to recliner following end of today's session)  Scooting: Maximal assistance  Comment: HOB elevated ~45 degrees, pt required Max Ax2 for LE and trunk progression to EOB. Transfers  Sit to stand: Moderate assistance;2 Person assistance  Stand to sit: 2 Person assistance; Moderate assistance  Transfer Comments: use of beena stedy at this time d/t reduced LE progression once standing and LLE unsteadiness/pain        Cognition  Overall Cognitive Status: Exceptions  Arousal/Alertness: Delayed responses to stimuli  Following Commands: Follows one step commands with repetition; Follows one step commands with increased time  Attention Span: Appears intact  Safety Judgement: Decreased awareness of need for safety  Problem Solving: Decreased awareness of errors;Assistance required to identify errors made;Assistance required to correct errors made;Assistance required to implement solutions;Assistance required to generate solutions  Insights: Decreased awareness of deficits  Initiation: Requires cues for all  Sequencing: Requires cues for all        Sensation  Overall Sensation Status: WFL (Pt denies any numbness or tingling at this time)        LUE AROM (degrees)  LUE General AROM: not formally assessed at this time. Functional reach [facility] to reach up to RW/beena stedy during functional transfers.   RUE AROM (degrees)  RUE AROM : Exceptions  R Shoulder Flexion 0-180: ~90  R Shoulder ABduction 0-180: ~90  R Elbow Flexion 0-145: WFL  R Elbow Extension 145-0: WFL  LUE Strength  Gross LUE Strength: Exceptions to [facility]  LUE Strength Comment: BUE strength not formally assessed this date, limitations evident secondary to increased physical assistance, Max Ax2, required to perform functional transfers from EOB. RUE Strength  Gross RUE Strength: Exceptions to [address]  Times per week: 5-6x/week  Times per day: Daily  Current Treatment Recommendations: Positioning, Safety Education & Training, ROM, Strengthening, Balance Training, Patient/Caregiver Education & Training, Self-Care / ADL, Functional Mobility Training, Equipment Evaluation, Education, & procurement, Endurance Training    AM-PAC Score  AM-[facility] Inpatient Daily Activity Raw Score: 13 (09/16/21 1608)  AM-PAC Inpatient ADL T-Scale Score : 32.03 (09/16/21 1608)  ADL Inpatient CMS 0-100% Score: 63.03 (09/16/21 1608)  ADL Inpatient CMS G-Code Modifier : CL (09/16/21 1608)    Goals  Short term goals  Time Frame for Short term goals: 14 visits  Short term goal 1: Pt will demo CGA, w/least restrictive AD, to perform functional mobility/transfers during ADLs. Short term goal 2: Pt will demo CGA, with DME as needed, to perform grooming/UB ADLs. Short term goal 3: Pt will demo Min A, with DME as needed, to perform toileting/LB ADLs. Short term goal 4: Pt will tolerate 15+ minutes of activity tolerance in functional tasks to maximize independence and engagement to perform ADLs.        Therapy Time   Individual Concurrent Group Co-treatment   Time In 1420         Time Out 1455         Minutes 35         Timed Code Treatment Minutes: 8 Minutes       Reyes Blossom, OTR/L

## 2021-09-17 VITALS
WEIGHT: 105.16 LBS | HEART RATE: 63 BPM | SYSTOLIC BLOOD PRESSURE: 162 MMHG | DIASTOLIC BLOOD PRESSURE: 75 MMHG | BODY MASS INDEX: 17.5 KG/M2 | RESPIRATION RATE: 16 BRPM | TEMPERATURE: 98.4 F | OXYGEN SATURATION: 97 %

## 2021-09-17 PROBLEM — E43 SEVERE PROTEIN-CALORIE MALNUTRITION (HCC): Status: ACTIVE | Noted: 2021-09-17

## 2021-09-17 LAB
ANION GAP SERPL CALCULATED.3IONS-SCNC: 11 MMOL/L (ref 9–17)
BUN BLDV-MCNC: 17 MG/DL (ref 8–23)
BUN/CREAT BLD: ABNORMAL (ref 9–20)
CALCIUM SERPL-MCNC: 9 MG/DL (ref 8.6–10.4)
CHLORIDE BLD-SCNC: 102 MMOL/L (ref 98–107)
CO2: 25 MMOL/L (ref 20–31)
CREAT SERPL-MCNC: 0.82 MG/DL (ref 0.5–0.9)
GFR AFRICAN AMERICAN: >60 ML/MIN
GFR NON-AFRICAN AMERICAN: >60 ML/MIN
GFR SERPL CREATININE-BSD FRML MDRD: ABNORMAL ML/MIN/{1.73_M2}
GFR SERPL CREATININE-BSD FRML MDRD: ABNORMAL ML/MIN/{1.73_M2}
GLUCOSE BLD-MCNC: 192 MG/DL (ref 70–99)
POTASSIUM SERPL-SCNC: 4.4 MMOL/L (ref 3.7–5.3)
SODIUM BLD-SCNC: 138 MMOL/L (ref 135–144)
VITAMIN D 25-HYDROXY: 27.9 NG/ML (ref 30–100)

## 2021-09-17 PROCEDURE — 6360000002 HC RX W HCPCS: Performed by: NURSE PRACTITIONER

## 2021-09-17 PROCEDURE — 99239 HOSP IP/OBS DSCHRG MGMT >30: CPT | Performed by: INTERNAL MEDICINE

## 2021-09-17 PROCEDURE — 2580000003 HC RX 258: Performed by: INTERNAL MEDICINE

## 2021-09-17 PROCEDURE — 36415 COLL VENOUS BLD VENIPUNCTURE: CPT

## 2021-09-17 PROCEDURE — 6370000000 HC RX 637 (ALT 250 FOR IP): Performed by: NURSE PRACTITIONER

## 2021-09-17 PROCEDURE — 82306 VITAMIN D 25 HYDROXY: CPT

## 2021-09-17 PROCEDURE — 97530 THERAPEUTIC ACTIVITIES: CPT

## 2021-09-17 PROCEDURE — 80048 BASIC METABOLIC PNL TOTAL CA: CPT

## 2021-09-17 PROCEDURE — 97110 THERAPEUTIC EXERCISES: CPT

## 2021-09-17 PROCEDURE — 97535 SELF CARE MNGMENT TRAINING: CPT

## 2021-09-17 PROCEDURE — 6370000000 HC RX 637 (ALT 250 FOR IP): Performed by: INTERNAL MEDICINE

## 2021-09-17 RX ORDER — POLYETHYLENE GLYCOL 3350 17 G/17G
17 POWDER, FOR SOLUTION ORAL DAILY
Qty: 527 G | Refills: 1 | DISCHARGE
Start: 2021-09-17 | End: 2021-10-17

## 2021-09-17 RX ORDER — SENNA AND DOCUSATE SODIUM 50; 8.6 MG/1; MG/1
2 TABLET, FILM COATED ORAL DAILY
DISCHARGE
Start: 2021-09-18

## 2021-09-17 RX ORDER — MIRTAZAPINE 7.5 MG/1
7.5 TABLET, FILM COATED ORAL NIGHTLY
Qty: 30 TABLET | Refills: 3 | DISCHARGE
Start: 2021-09-17

## 2021-09-17 RX ORDER — PAROXETINE HYDROCHLORIDE 20 MG/1
20 TABLET, FILM COATED ORAL NIGHTLY
DISCHARGE
Start: 2021-09-17

## 2021-09-17 RX ORDER — VITAMIN E 268 MG
180 CAPSULE ORAL DAILY
Status: DISCONTINUED | OUTPATIENT
Start: 2021-09-18 | End: 2021-09-17 | Stop reason: HOSPADM

## 2021-09-17 RX ORDER — SPIRONOLACTONE 25 MG/1
25 TABLET ORAL DAILY
Qty: 30 TABLET | Refills: 3 | DISCHARGE
Start: 2021-09-17

## 2021-09-17 RX ADMIN — Medication 100 MG: at 08:08

## 2021-09-17 RX ADMIN — LEVOTHYROXINE SODIUM 50 MCG: 0.05 TABLET ORAL at 08:08

## 2021-09-17 RX ADMIN — ATORVASTATIN CALCIUM 10 MG: 10 TABLET, FILM COATED ORAL at 08:07

## 2021-09-17 RX ADMIN — METOPROLOL SUCCINATE 25 MG: 25 TABLET, EXTENDED RELEASE ORAL at 08:07

## 2021-09-17 RX ADMIN — AMLODIPINE BESYLATE 5 MG: 5 TABLET ORAL at 08:07

## 2021-09-17 RX ADMIN — ENOXAPARIN SODIUM 40 MG: 40 INJECTION SUBCUTANEOUS at 08:12

## 2021-09-17 RX ADMIN — DOCUSATE SODIUM 50MG AND SENNOSIDES 8.6MG 2 TABLET: 8.6; 5 TABLET, FILM COATED ORAL at 08:07

## 2021-09-17 RX ADMIN — FLUTICASONE PROPIONATE 1 SPRAY: 50 SPRAY, METERED NASAL at 08:13

## 2021-09-17 RX ADMIN — CYANOCOBALAMIN TAB 500 MCG 500 MCG: 500 TAB at 08:08

## 2021-09-17 RX ADMIN — PREDNISONE 10 MG: 10 TABLET ORAL at 08:07

## 2021-09-17 RX ADMIN — FAMOTIDINE 20 MG: 20 TABLET ORAL at 08:08

## 2021-09-17 RX ADMIN — SODIUM CHLORIDE: 9 INJECTION, SOLUTION INTRAVENOUS at 08:13

## 2021-09-17 RX ADMIN — Medication 400 UNITS: at 08:07

## 2021-09-17 RX ADMIN — CALCIUM CARBONATE 600 MG (1,500 MG)-VITAMIN D3 400 UNIT TABLET 1 TABLET: at 08:08

## 2021-09-17 ASSESSMENT — PAIN DESCRIPTION - ORIENTATION
ORIENTATION: LEFT
ORIENTATION: LEFT

## 2021-09-17 ASSESSMENT — PAIN DESCRIPTION - PAIN TYPE
TYPE: ACUTE PAIN
TYPE: ACUTE PAIN

## 2021-09-17 ASSESSMENT — PAIN SCALES - GENERAL
PAINLEVEL_OUTOF10: 0
PAINLEVEL_OUTOF10: 8
PAINLEVEL_OUTOF10: 8
PAINLEVEL_OUTOF10: 0

## 2021-09-17 ASSESSMENT — PAIN - FUNCTIONAL ASSESSMENT
PAIN_FUNCTIONAL_ASSESSMENT: PREVENTS OR INTERFERES SOME ACTIVE ACTIVITIES AND ADLS
PAIN_FUNCTIONAL_ASSESSMENT: PREVENTS OR INTERFERES SOME ACTIVE ACTIVITIES AND ADLS

## 2021-09-17 ASSESSMENT — PAIN DESCRIPTION - LOCATION
LOCATION: HIP;GROIN
LOCATION: HIP

## 2021-09-17 ASSESSMENT — PAIN DESCRIPTION - ONSET: ONSET: ON-GOING

## 2021-09-17 ASSESSMENT — PAIN DESCRIPTION - DESCRIPTORS
DESCRIPTORS: ACHING;DISCOMFORT
DESCRIPTORS: ACHING;DISCOMFORT

## 2021-09-17 ASSESSMENT — PAIN DESCRIPTION - FREQUENCY: FREQUENCY: CONTINUOUS

## 2021-09-17 NOTE — DISCHARGE INSTR - COC
Continuity of Care Form    Patient Name: Dangelo Ribeiro   :  89  MRN:  [id number]    Admit date:  9/15/2021  Discharge date:  2021    Code Status Order: Full Code   Advance Directives:      Admitting Physician:  Janett Puente MD  PCP: Bobo Steele MD    Discharging Nurse: Oliver Newsome [facility] Unit/Room#: 326/326-01  Discharging Unit Phone Number: [phone]    Emergency Contact:   Extended Emergency Contact Information  Primary Emergency Contact: Jamila Epps  [place] Phone: [phone]  Relation: Child   needed? No  Secondary Emergency Contact: Yokasta Reynolds[address] Phone: [phone]  Relation: Child    Past Surgical History:  Past Surgical History:   Procedure Laterality Date    ABDOMINAL EXPLORATION SURGERY  2010    (Dr. Sai Bourne) - With sigmoid colectomy and left colostomy with a Sukhwinder's procedure.  CATARACT REMOVAL      CHOLECYSTECTOMY, LAPAROSCOPIC  1997    COLECTOMY  2021    COLONOSCOPY  2010    (Dr. Sai Bourne) Status post Sukhwinder's procedure.  COLPOSCOPY  Remote    (for YISSEL-1).  CYSTOSCOPY  2010    (Dr. Sai Bourne) - Bilateral ureteral catheters.  DILATION AND CURETTAGE OF UTERUS  1985    (for post-menopausal bleeding).  DILATION AND CURETTAGE OF UTERUS  1983    (for menometrrohagia).  OTHER SURGICAL HISTORY  2013    YAG laser capsulotomy - right eye. (Dr. Lauro Holt).  OTHER SURGICAL HISTORY  2010    (Dr. Sai Bourne) - Laparoscopic colostomy takedown with lysis of adhesions and colorectal anastomosis using the EEA-25 staple. (Also, preoperative bilateral lighted stent insertion per Dr. Jerome Flores).  SMALL INTESTINE SURGERY  2010    (Dr. Sai Bourne).      UMBILICAL HERNIA REPAIR  1997    YAG CAPSULOTOMY         Immunization History:   Immunization History   Administered Date(s) Administered    COVID-19, Moderna, PF, 100mcg/0.5mL 2021, 2021    Influenza Virus Vaccine 11/12/2010, 11/02/2011, 11/06/2012    Influenza, High Dose (Fluzone 65 yrs and older) 11/05/2013, 11/03/2014, 10/29/2015, 11/07/2016, 11/02/2017, 11/01/2018    Influenza, Cleve Half, adjuvanted, 65 yrs +, IM, PF (Fluad) 10/02/2020    Influenza, Triv, inactivated, subunit, adjuvanted, IM (Fluad 65 yrs and older) 11/01/2019    Pneumococcal Conjugate 13-valent ([id number]) 08/19/2016    Pneumococcal Polysaccharide ([id number]) 10/07/2011    Zoster Live (Zostavax) 07/16/2012       Active Problems:  Patient Active Problem List   Diagnosis Code    Hypertension I10    Hyperlipidemia E78.5    Renal insufficiency N28.9    Anxiety disorder F41.9    Osteoporosis M81.0    Closed displaced fracture of second cervical vertebra ([facility] S12.100A    Muscle spasm M62.838    Coronary artery disease due to lipid rich plaque I25.10, I25.83    Pathological fracture, pelvis, initial encounter for fracture M84.454A    Closed fracture of multiple pubic rami, left, initial encounter [facility]) S32.592A    Closed fracture of iliac crest, left, initial encounter ([facility] S32.302A    Severe protein-calorie malnutrition (HCC) E43       Isolation/Infection:   Isolation            No Isolation          Patient Infection Status       None to display            Nurse Assessment:  Last Vital Signs: BP (!) 144/80   Pulse 83   Temp 98.6 °F (37 °C) (Oral)   Resp 18   Wt 101 lb 6.6 oz (46 kg)   SpO2 93%   BMI 16.88 kg/m²     Last documented pain score (0-10 scale): Pain Level: 0  Last Weight:   Wt Readings from Last 1 Encounters:   09/16/21 101 lb 6.6 oz (46 kg)     Mental Status:  oriented, alert and slow to respond    IV Access:  - None    Nursing Mobility/ADLs:  Walking   Dependent  Transfer  Assisted  Bathing  Assisted  Dressing  Assisted  Toileting  Assisted  Feeding  Independent  Med Admin  Assisted  Med Delivery   whole    Wound Care Documentation and Therapy: n/a        Elimination:  Continence:   · Bowel: No  · Bladder: No  Urinary Catheter: None   Colostomy/Ileostomy/Ileal Conduit: No       Date of Last BM: 9/14/21    Intake/Output Summary (Last 24 hours) at 9/17/2021 0924  Last data filed at 9/17/2021 0813  Gross per 24 hour   Intake 1088.33 ml   Output 1200 ml   Net -111.67 ml     I/O last 3 completed shifts: In: 1088.3 [P.O.:300; I.V.:788.3]  Out: 850 [Urine:850]    Safety Concerns:     History of Falls (last 30 days) and At Risk for Falls    Impairments/Disabilities:      None    Nutrition Therapy:  Current Nutrition Therapy:   - Oral Diet:  Dysphagia - soft with small bites    Routes of Feeding: Oral  Liquids: No Restrictions  Daily Fluid Restriction: no  Last Modified Barium Swallow with Video (Video Swallowing Test): not done    Treatments at the Time of Hospital Discharge:   Respiratory Treatments: none  Oxygen Therapy:  is not on home oxygen therapy.   Ventilator:    - No ventilator support    Rehab Therapies: Physical Therapy and Occupational Therapy  Weight Bearing Status/Restrictions: No weight bearing restirctions  Other Medical Equipment (for information only, NOT a DME order):  walker and beena steady  Other Treatments:     Patient's personal belongings (please select all that are sent with patient):  None    RN SIGNATURE:  Electronically signed by Lyubov Mercado RN on 9/17/21 at 11:50 AM EDT    CASE MANAGEMENT/SOCIAL WORK SECTION    Inpatient Status Date: 9/17/2021    Readmission Risk Assessment Score:  Readmission Risk              Risk of Unplanned Readmission:  17           Discharging to Facility/ Agency   · Name: [address] [place] [address]  · Address:  · Phone: [phone]  · Fax:    Dialysis Facility (if applicable)   · Name:  · Address:  · Dialysis Schedule:  · Phone:  · Fax:    / signature: Electronically signed by Royer Oconnor RN on 9/17/21 at 10:57 AM EDT    PHYSICIAN SECTION    Prognosis: Fair    Condition at Discharge: Stable    Rehab Potential (if transferring to Rehab): Fair    Recommended Labs or Other Treatments After Discharge:     Physician Certification: I certify the above information and transfer of Chivo Mitchell  is necessary for the continuing treatment of the diagnosis listed and that she requires [place] for less 30 days.      Update Admission H&P: No change in H&P    PHYSICIAN SIGNATURE:  Electronically signed by Clayton Lemos MD on 9/17/21 at 9:24 AM EDT

## 2021-09-17 NOTE — PROGRESS NOTES
Physical Therapy  Facility/Department: [facility] SURG ICU  Daily Treatment Note  NAME: Kirk Fernández  : [id number]  MRN: [id number]    Date of Service: 2021    Discharge Recommendations:  Patient would benefit from continued therapy after discharge   PT Equipment Recommendations  Equipment Needed:  (TBD- none at this time)    Assessment   Body structures, Functions, Activity limitations: Decreased functional mobility ; Decreased endurance;Decreased strength;Decreased balance; Increased pain;Decreased safe awareness  Assessment: Pt with noted bilateral LE weakness, LLE pain and impaired mobility secondary to her limitations. Pt with improving mobility requiring only mod Ax1 to  the beena stedy today. Pt will be unsafe to return to her prior living arrangements based on demonstrated mobility and will benefit from further therapy at discharge. Prognosis: Fair  PT Education: Transfer Training;Functional Mobility Training  Patient Education: supine exercises  REQUIRES PT FOLLOW UP: Yes  Activity Tolerance  Activity Tolerance: Patient limited by endurance; Patient limited by fatigue;Patient limited by pain     Patient Diagnosis(es): The encounter diagnosis was Anxiety. has a past medical history of Anxiety neurosis, Cataract, History of peritonitis, Hyperlipidemia, Hypertension, Macular edema, Pelvis fracture ([address] Pseudophakos, and Renal insufficiency. has a past surgical history that includes other surgical history (2013); Small intestine surgery (2010); other surgical history (2010); Cystoscopy (2010); Colonoscopy (2010); Abdominal exploration surgery (2010); Cholecystectomy, laparoscopic (1997); Umbilical hernia repair (1997); Dilation and curettage of uterus (1985); Dilation and curettage of uterus (1983); Colposcopy (Remote);  Cataract removal; Yag capsulotomy; and colectomy (06/2021). Restrictions  Restrictions/Precautions  Restrictions/Precautions: Weight Bearing, General Precautions, Fall Risk  Required Braces or Orthoses?: No  Lower Extremity Weight Bearing Restrictions  Left Lower Extremity Weight Bearing: Weight Bearing As Tolerated  Position Activity Restriction  Other position/activity restrictions: LLE WBAT w/device per Dr. Pat Bartholomew therapy orders  Subjective   General  Response To Previous Treatment: Patient with no complaints from previous session. Family / Caregiver Present: No  Subjective  Subjective: Pt supine in bed and agreeable to therapy with encouragement. RN agreeable to therapy. Pain Screening  Patient Currently in Pain: Yes  Pain Assessment  Pain Assessment: 0-10  Pain Level: 8  Pain Type: Acute pain  Pain Location: Hip;Groin  Pain Orientation: Left  Pain Descriptors: Aching;Discomfort  Functional Pain Assessment: Prevents or interferes some active activities and ADLs  Non-Pharmaceutical Pain Intervention(s): Ambulation/Increased Activity; Distraction;Repositioned  Response to Pain Intervention: Patient Satisfied  Vital Signs  Patient Currently in Pain: Yes       Cognition   Cognition  Overall Cognitive Status: Exceptions  Arousal/Alertness: Delayed responses to stimuli  Following Commands: Follows one step commands with repetition; Follows one step commands with increased time  Safety Judgement: Decreased awareness of need for safety;Decreased awareness of need for assistance  Problem Solving: Decreased awareness of errors;Assistance required to identify errors made;Assistance required to correct errors made;Assistance required to implement solutions;Assistance required to generate solutions  Insights: Decreased awareness of deficits  Initiation: Requires cues for all  Sequencing: Requires cues for all  Cognition Comment: increased time for processing,verbal cues for sequencing/initiation  Objective   Bed mobility  Rolling to Left: Moderate assistance  Supine to Sit: 2 Person assistance;Maximum assistance  Sit to Supine:  (Pt retired to chair at end of session)  Scooting: Maximal assistance (at EOB and in chair today)  Comment: HOB raised 60 degrees, max cues for LLE progression and use of hand rails to assist with trunk progression. Increased time for all commands. Transfers  Sit to Stand: Moderate Assistance  Stand to sit: Moderate Assistance  Bed to Chair: Dependent/Total ([facility])  Comment: Pt stood once from bed height, twice from raised [facility] height (min A only from the raised height of SS). Pt able to tolerate 2 periods of standing: approximately 30-60 seconds each attempt with min A to maintain standing. Ambulation  Ambulation?: No  Stairs/Curb  Stairs?: No     Balance  Posture: Poor  Sitting - Static: Fair  Sitting - Dynamic: Fair;-  Standing - Static: Poor  Standing - Dynamic: Poor  Comments: standing balance assessed in [facility] this date; Able to sit EOB x20 minutes with SB-CGA to maintain balance once positioned with feet on the floor  Exercises  Hamstring Sets: x10 bilaterally  Quad Sets: x10 bilaterally  Heelslides: x10 bilaterally  Hip Abduction: x10 bilaterally  Knee Short Arc Quad: x10 bilaterally  Ankle Pumps: x10 bilaterally  Comments: Mostly AAROM secondary to decreased cognition and overall difficulty understanding commands for exercises                        AM-PAC Score     AM-PAC Inpatient Mobility without Stair Climbing Raw Score : 8 (09/17/21 1327)  AM-PAC Inpatient without Stair Climbing T-Scale Score : 30.65 (09/17/21 1327)  Mobility Inpatient CMS 0-100% Score: 80.91 (09/17/21 1327)  Mobility Inpatient without Stair CMS G-Code Modifier : CM (09/17/21 1327)       Goals  Short term goals  Time Frame for Short term goals: 14 visits  Short term goal 1: Bed mobility with CGA with modifications as needed. Short term goal 2: Pt to perform supine and seated LE exercises x 15 reps to improve strength for mobility.   Short term goal 3: Pt to transfer with Min-CGA with RW without LOB. Short term goal 4: Pt to ambulate with RW 50' x 1 CGA without LOB. Short term goal 5: Improve seated balance to Good; standing static/dynamic balance to Fair with device.   Patient Goals   Patient goals : none stated    Plan    Plan  Times per week: 5-6x/week  Times per day: Daily  Current Treatment Recommendations: Strengthening, Transfer Training, Endurance Training, Patient/Caregiver Education & Training, Balance Training, Gait Training, Safety Education & Training, Functional Mobility Training, ROM  Safety Devices  Type of devices: Call light within reach, Left in chair, Chair alarm in place, Gait belt, Nurse notified, Patient at risk for falls  Restraints  Initially in place: No     Therapy Time   Individual Concurrent Group Co-treatment   Time In 1105         Time Out 1151         Minutes 46         Timed Code Treatment Minutes: 23 Minutes (co treat with OT secondary to level of assistance required- time code minutes adjusted accordingly)       Mayi Weiner, PT

## 2021-09-17 NOTE — H&P
[facility]  Office: [address], DO, Pandya Joshua, DO, Crews Shown, DO, Eliel Chavezyarely Blood, DO, Bassam Zambrano MD, Dariusz Naidu MD, Claudia Blackburn MD, Rosalind Ellis MD, Jonathan Horton MD, Renee Quesada MD, Joann Nunez MD, Shweta Acharya, DO, Christi Estrada, DO, Linda Kidd MD,  Noel Simmons, DO, Jose L Braun MD, Hima Key MD, Melvern Essex, MD, Meka Bo MD, , Anabel Khan MD, Rebecca Gutierrez MD, Danis Dickinson MD, Desi Saha, [facility], [facility], CNP, Cathy Abarca, CNP, Nikolay Mallory, CNS, Margie Lopez, CNP, Miriam Davis, CNP, Babar Moreland, CNP, Collin Smith, CNP, Maude Montana, CNP, Izabella Smith PA-C, Jun Best, [facility], Maynor Wiley, CNP, Joshua Rogers, CNP, Cheyenne Wright, CNP, Jennifer Davis, CNP, Yulissa Fisher, CNP, Leah López, CNP, Roseann Inman, CNP, Levi Norton, CNP         [facility]   [address] [facility]    HISTORY AND PHYSICAL EXAMINATION            Date:   9/17/2021  Patient name:  Charisse Mendoza  Date of admission:  9/15/2021  7:01 PM  MRN:   [id number]  Account:  [de-identified]  YOB: 1932  PCP:    Judy Gamboa MD  Room:   326/326-  Code Status:    Full Code    Chief Complaint:     Fall    History Obtained From:     patient, electronic medical record, son    History of Present Illness: Charisse Mendoza is a 80 y.o. Non- / non  female who presents with No chief complaint on file. and is admitted to the hospital for the management of Closed fracture of multiple pubic rami, left, initial encounter ([facility] [address] Patient is a pleasant 35-year-old  female with PMH of hypertension, anxiety, and recent bowel obstruction status post exlap presented to the ED status post fall. Patient found to have multiple fractures of the pelvis. Patient son is at bedside, however daughter is [address]  Patient's son provides history that patient is in assisted living. Apparently she got up to use the bathroom overnight and fell. She denies any pain, cough or shortness of breath. Since her bowel obstruction surgery in August, she has lost some weight but continues to eat small 3 meals a day. She has been admitted for evaluation by orthopedic surgery. Past Medical History:     Past Medical History:   Diagnosis Date    Anxiety neurosis     Cataract     (Left eye).  History of peritonitis 2010    Hyperlipidemia     Hypertension     Macular edema     Pelvis fracture (HCC)     Pseudophakos     (Right eye).  Renal insufficiency     (Mild)        Past Surgical History:     Past Surgical History:   Procedure Laterality Date    ABDOMINAL EXPLORATION SURGERY  04/02/2010    (Dr. Shannon Ovalles) - With sigmoid colectomy and left colostomy with a Sukhwinder's procedure.  CATARACT REMOVAL      CHOLECYSTECTOMY, LAPAROSCOPIC  08/18/1997    COLECTOMY  06/2021    COLONOSCOPY  06/08/2010    (Dr. Shannon Ovalles) Status post Sukhwinder's procedure.  COLPOSCOPY  Remote    (for YISSEL-1).  CYSTOSCOPY  08/18/2010    (Dr. Shannon Ovalles) - Bilateral ureteral catheters.  DILATION AND CURETTAGE OF UTERUS  06/07/1985    (for post-menopausal bleeding).  DILATION AND CURETTAGE OF UTERUS  12/23/1983    (for menometrrohagia).  OTHER SURGICAL HISTORY  02/05/2013    YAG laser capsulotomy - right eye. (Dr. Alicia Owens).  OTHER SURGICAL HISTORY  08/18/2010    (Dr. Shannon Ovalles) - Laparoscopic colostomy takedown with lysis of adhesions and colorectal anastomosis using the EEA-25 staple. (Also, preoperative bilateral lighted stent insertion per Dr. Leon Lozano).  SMALL INTESTINE SURGERY  08/21/2010    (Dr. Shannon Ovalles).  UMBILICAL HERNIA REPAIR  08/18/1997    YAG CAPSULOTOMY          Medications Prior to Admission:     Prior to Admission medications    Medication Sig Start Date End Date Taking?  Authorizing Provider   cloNIDine (CATAPRES) 0.3 MG tablet Take 0.3 mg by mouth nightly   Yes Historical Provider, MD vitamin B-12 (CYANOCOBALAMIN) 500 MCG tablet Take 500 mcg by mouth daily   Yes Historical Provider, MD   metoprolol succinate (TOPROL XL) 25 MG extended release tablet Take 25 mg by mouth daily   Yes Historical Provider, MD   predniSONE (DELTASONE) 10 MG tablet Take 10 mg by mouth daily 9/13/21-9/26/21   Yes Historical Provider, MD   pyridoxine (B-6) 100 MG tablet Take 100 mg by mouth daily   Yes Historical Provider, MD   vitamin E 400 UNIT capsule Take 400 Units by mouth daily   Yes Historical Provider, MD   spironolactone (ALDACTONE) 25 MG tablet Take 25 mg by mouth daily    Historical Provider, MD   pantoprazole (PROTONIX) 40 MG tablet Take 40 mg by mouth daily    Historical Provider, MD   furosemide (LASIX) 20 MG tablet Take 20 mg by mouth daily Only takes on Tues, Thur and Sat     Historical Provider, MD   PARoxetine (PAXIL) 20 MG tablet TAKE ONE TABLET BY MOUTH DAILY  Patient taking differently: Take 20 mg by mouth nightly TAKE ONE TABLET BY MOUTH DAILY 8/9/21   Meryle Geralds, MD   amLODIPine (NORVASC) 5 MG tablet Take 5 mg by mouth daily    Historical Provider, MD   Misc. Devices (WALKER) MISC Rolling walker with seat 7/6/21   Meryle Geralds, MD   simvastatin (ZOCOR) 10 MG tablet TAKE ONE TABLET BY MOUTH EVERY EVENING 2/4/21   Meryle Geralds, MD   levothyroxine (SYNTHROID) 50 MCG tablet TAKE ONE TABLET BY MOUTH DAILY 12/29/20   Meryle Geralds, MD   [facility]      DX: cervical fracture with delayed healing 8/29/18   CLAEB Duke - CNP   fluticasone (FLONASE) 50 MCG/ACT nasal spray 1 spray each nostril daily. 7/31/18   Meryle Geralds, MD   [facility].  Devices (BATH BENCH WITH BACK) [facility] 1 Device by Does not apply route 2 times daily 7/23/18   Juan Luis Knapp MD   acetaminophen (TYLENOL) 325 MG tablet Take 2 tablets by mouth every 4 hours as needed for Pain or Fever 4/10/18   Mejia Nayak   calcium carbonate-vitamin D (CALTRATE 600+D) 600-400 MG-UNIT TABS per tab Take 1 tablet by mouth 2 times daily. Historical Provider, MD        Allergies:     Bactrim [sulfamethoxazole-trimethoprim] and Flagyl [metronidazole]    Social History:     Tobacco:    reports that she has never smoked. She has never used smokeless tobacco.  Alcohol:      reports no history of alcohol use. Drug Use:  reports no history of drug use. Family History:     Family History   Problem Relation Age of Onset    Cancer Mother         (unknown type)    Cancer Sister         (mediastinal cancer - unknown type).  Cancer Brother         (Stomach).  Cervical Cancer Neg Hx     Glaucoma Neg Hx        Review of Systems:     Positive and Negative as described in HPI.     CONSTITUTIONAL:  negative for fevers, chills, sweats, fatigue, weight loss  HEENT:  negative for vision, hearing changes, runny nose, throat pain  RESPIRATORY:  negative for shortness of breath, cough, congestion, wheezing  CARDIOVASCULAR:  negative for chest pain, palpitations  GASTROINTESTINAL:  negative for nausea, vomiting, diarrhea, , change in bowel habits, abdominal pain, + constipation  GENITOURINARY:  negative for difficulty of urination, burning with urination, frequency   INTEGUMENT:  negative for rash, skin lesions, easy bruising   HEMATOLOGIC/LYMPHATIC:  negative for swelling/edema   ALLERGIC/IMMUNOLOGIC:  negative for urticaria , itching  ENDOCRINE:  negative increase in drinking, increase in urination, hot or cold intolerance  MUSCULOSKELETAL:  negative joint pains, muscle aches, swelling of joints  NEUROLOGICAL:  negative for headaches, dizziness, lightheadedness, numbness, pain, tingling extremities  BEHAVIOR/PSYCH:  negative for depression, + anxiety    Physical Exam:   BP (!) 144/80   Pulse 83   Temp 98.6 °F (37 °C) (Oral)   Resp 18   Wt 101 lb 6.6 oz (46 kg)   SpO2 93%   BMI 16.88 kg/m²   Temp (24hrs), Av.9 °F (37.2 °C), Min:98.6 °F (37 °C), Max:99.2 °F (37.3 °C)    No results for input(s): POCGLU in the last 72 hours.    Intake/Output Summary (Last 24 hours) at 9/17/2021 0912  Last data filed at 9/17/2021 0813  Gross per 24 hour   Intake 1088.33 ml   Output 1200 ml   Net -111.67 ml       General Appearance:  alert, ill appearing, and in no acute distress  Mental status: oriented to person, place, only, [place]  Head:  normocephalic, atraumatic  Eye: no icterus, redness, pupils equal and reactive, extraocular eye movements intact, conjunctiva clear  Ear: normal external ear, no discharge, hearing intact  Nose:  no drainage noted  Mouth: mucous membranes moist  Neck: supple, no carotid bruits, thyroid not palpable  Lungs: Bilateral equal air entry, clear to ausculation, no wheezing, rales or rhonchi, normal effort  Cardiovascular: normal rate, regular rhythm, no murmur, gallop, rub  Abdomen: Soft, nontender, nondistended, normal bowel sounds, no hepatomegaly or splenomegaly  Neurologic: There are no new focal motor or sensory deficits, normal muscle tone and bulk, no abnormal sensation, normal speech, cranial nerves II through XII grossly intact  Skin: No gross lesions, rashes, bruising or bleeding on exposed skin area  Extremities:  peripheral pulses palpable, + 1 edema bilateral LE  Psych: Flat affect     Investigations:      Laboratory Testing:  No results found for this or any previous visit (from the past 24 hour(s)). Imaging/Diagnostics:  XR SHOULDER RIGHT (MIN 2 VIEWS)    Result Date: 9/15/2021  No acute bony abnormalities are noted     XR HIP LEFT (2-3 VIEWS)    Result Date: 9/15/2021  Moderately displaced acute mid to anterior left superior and inferior pubic ramus fractures. Fracture extends to the symphysis. Old healed fractures of the right pubic rami. Postop changes in the pelvis. CT HEAD WO CONTRAST    Result Date: 9/15/2021  1. No clear evidence for acute intracranial hemorrhage or midline shift. 2. Mild atrophy. Mild chronic small vessel ischemic white matter disease.  3. Atherosclerotic calcification of the parasellar carotid arteries. CT ABDOMEN PELVIS W IV CONTRAST Additional Contrast? None    Result Date: 9/15/2021  No visceral traumatic injury in the abdomen or pelvis. New left comminuted moderately displaced superior pubic ramus fracture extending to the pubic body and nondisplaced inferior pubic ramus fracture. Comminuted essentially nondisplaced fracture of the left iliac wing extending to the sacroiliac joint anteriorly. No pubic symphysis diastasis or widening of the sacroiliac joints. Moderate regional left pelvic deep/muscular hematoma related to the fractures. No contrast extravasation to indicate active hemorrhage. No free pelvic fluid to indicate bladder rupture. Assessment :      Hospital Problems         Last Modified POA    * (Principal) Closed fracture of multiple pubic rami, left, initial encounter ([facility] [address] 9/17/2021 Yes    Hypertension 9/17/2021 Yes    Osteoporosis 9/17/2021 Yes    Closed fracture of iliac crest, left, initial encounter ([facility] [address] 9/16/2021 Yes    Severe protein-calorie malnutrition ([facility] [address] 9/17/2021 Yes          Plan:     Patient status inpatient in the Med/Surge    1. Multiple fractures of pelvis, status post fall-appreciate orthopedic surgery evaluation, likely nonsurgical. PT/OT, pain control   2. Hypertension-BP stable  3. Severe protein calorie malnutrition-try Remeron 7.5 mg p.o. nightly  4. Osteoporosis- Calcium and Vit D, add on Vit D level  5. Constipation- add Senokot-S  6. Gi/ DVT proph  7. PT/OT  8. Full code-Long discussion with patient's son and daughter on the phone regarding CODE STATUS. Explained the difference between full code/DNR CCA/DNR CC. Patient's family will discuss this further together before making decision. Patient appears like she has failure to thrive, and is very thin.   9. Social work-patient will need more services than assisted living, Elyse Lopez initiated      Consultations:   IP CONSULT TO ORTHOPEDIC SURGERY     Patient is admitted as

## 2021-09-17 NOTE — PLAN OF CARE
Problem: Skin Integrity:  Goal: Will show no infection signs and symptoms  Description: Will show no infection signs and symptoms  Outcome: Ongoing  Goal: Absence of new skin breakdown  Description: Absence of new skin breakdown  Outcome: Ongoing     Problem: Falls - Risk of:  Goal: Will remain free from falls  Description: Will remain free from falls  Outcome: Ongoing  Goal: Absence of physical injury  Description: Absence of physical injury  Outcome: Ongoing     Problem: Pain:  Goal: Pain level will decrease  Description: Pain level will decrease  Outcome: Ongoing  Goal: Control of acute pain  Description: Control of acute pain  Outcome: Ongoing  Goal: Control of chronic pain  Description: Control of chronic pain  Outcome: Ongoing     Problem: Musculor/Skeletal Functional Status  Goal: Highest potential functional level  Outcome: Ongoing  Goal: Absence of falls  Outcome: Ongoing

## 2021-09-17 NOTE — CARE COORDINATION
Call to [facility] at [place] - spoke with Jamar Moura in admissions requesting clinical to be faxed, no access to [facility] - faxed.
Discharge [facility] Case Management Department  Written by: Denice Granda RN    Patient Name: Shannan Hutchinson  Attending Provider: Claribel Self MD  Admit Date: 9/15/2021  7:01 PM  MRN: [id number]  Account: [de-identified]                     : 1932  Discharge Date: 2021      Disposition: [facility] - [place] p/u at 3pm via  [address] [facility] - daughter Nyasia Stewart updated via phone    Denice Granda RN
spouse x 2 weeks          Electronically signed by Rose Tracy RN on 9/16/21 at 8:21 AM EDT

## 2021-09-17 NOTE — PROGRESS NOTES
Occupational Therapy  Facility/Department: [facility] ICU  Daily Treatment Note  NAME: Travis Yang  :   MRN: [id number]    Date of Service: 2021    Discharge Recommendations:  Patient would benefit from continued therapy after discharge       Assessment   Performance deficits / Impairments: Decreased functional mobility ; Decreased safe awareness;Decreased balance;Decreased posture;Decreased ADL status; Decreased high-level IADLs;Decreased strength;Decreased ROM  Assessment: Pt presented with decreased independence with ADLs, functional transfers, functional mobility, and activity tolerance this date. Pt continues to benefit from skilled occupational therapy services during admission and would benefit from skilled OT at discharge to maximize independence with functional tasks. Pt would be unsafe to return to prior living situation without further therapy services. Prognosis: Fair  OT Education: OT Role;Plan of Care;Precautions; Equipment;Transfer Training  Patient Education: use of beena stedy - bed mobility, importance of bed mobility  Barriers to Learning: reduced processing time  REQUIRES OT FOLLOW UP: Yes  Activity Tolerance  Activity Tolerance: Patient limited by pain  Activity Tolerance: LE unsteadiness  Safety Devices  Safety Devices in place: Yes  Type of devices: Call light within reach; Chair alarm in place; Left in chair;Nurse notified  Restraints  Initially in place: No         Patient Diagnosis(es): The encounter diagnosis was Anxiety. has a past medical history of Anxiety neurosis, Cataract, History of peritonitis, Hyperlipidemia, Hypertension, Macular edema, Pelvis fracture ([address] Pseudophakos, and Renal insufficiency. has a past surgical history that includes other surgical history (2013); Small intestine surgery (2010); other surgical history (2010); Cystoscopy (2010); Colonoscopy (2010);  Abdominal exploration surgery (2010); Cholecystectomy, laparoscopic (08/18/1997); Umbilical hernia repair (08/18/1997); Dilation and curettage of uterus (06/07/1985); Dilation and curettage of uterus (12/23/1983); Colposcopy (Remote); Cataract removal; Yag capsulotomy; and colectomy (06/2021). Restrictions  Restrictions/Precautions  Restrictions/Precautions: Weight Bearing, General Precautions, Fall Risk  Required Braces or Orthoses?: No  Lower Extremity Weight Bearing Restrictions  Left Lower Extremity Weight Bearing: Weight Bearing As Tolerated  Position Activity Restriction  Other position/activity restrictions: LLE WBAT w/device per Dr. Alvaro Leong orders. Subjective   General  Patient assessed for rehabilitation services?: Yes  Family / Caregiver Present: No  General Comment  Comments: Per Dr. Rick Crouch orders, pt LLE WBAT. RN approval for OOB activity this date and pt agreeable to co-tx. Co-tx completed for safety and assist level. Concluded session with pt reclined in chair, call light within reach and chair alarm and RN notified. Pain Assessment  Pain Assessment: 0-10  Pain Level: 8  Pain Type: Acute pain  Pain Location: Hip  Pain Orientation: Left  Pain Descriptors: Aching;Discomfort  Pain Frequency: Continuous  Pain Onset: On-going  Functional Pain Assessment: Prevents or interferes some active activities and ADLs  Non-Pharmaceutical Pain Intervention(s): Repositioned;Distraction  Response to Pain Intervention: Patient Satisfied  Vital Signs  Patient Currently in Pain: Yes   Orientation  Orientation  Overall Orientation Status:  (not formally assessed this date, [facility] during tx session)  Objective    ADL  Grooming: Increased time to complete;Minimal assistance (verbal cues provided for brushing hair EOB, pt completed oral care with min A for toothpaste/holding basin.  Facial care w/ increased time)  UE Bathing: Increased time to complete;Minimal assistance (assistance for washing back only, pt able to wash and dry BUE/chest with significant increased time)  LE Bathing: Increased time to complete; Moderate assistance (assistance provided for bilat shins and distally to feet)  UE Dressing: Increased time to complete;Minimal assistance;Verbal cueing (doffing/donning clean gown)  LE Dressing: Increased time to complete;Maximum assistance (assistance for doffing/donning bilat socks, doffing brief and donning clean brief.)  Toileting: Dependent/Total (purewick with soiled brief upon room entry, dependent for pericare in standing on beena stedy)          Balance  Sitting Balance: Stand by assistance (CGA at EOB, quickly progressed to SBA for seated EOB ADL session. Seated unsupported ~20 minutes EOB.)  Standing Balance: Minimal assistance (static standing on beena stedy, small bouts of static standing for 15-20seconds, ~2 minutes total)  Functional Mobility  Functional - Mobility Device:  (functional mobility not completed this date as pt reports pain in LLE and decreased activity tolerance with standing.)  Functional Mobility Comments: Use of beena stedy, minAx2, at this time d/t LLE pain and unsteadiness. Bed mobility  Supine to Sit: 2 Person assistance;Maximum assistance  Sit to Supine: Unable to assess (concluded session with pt up to chair)  Scooting: Maximal assistance (EOB and within chair)  Comment: HOB elevated to complete bed mobility, pt required Max A for progression of LLE and trunk progression to EOB. Verbal cues provided throughout for initiation/hand placement with use of bed rail. Transfers  Sit to stand: Moderate assistance  Stand to sit: 2 Person assistance;Minimal assistance  Transfer Comments: use of beena stedy this date d/t unsteadiness/pain. Mod A x1 for sit to stand from EOB with use of beena stedy, sit to stand from beena stedy paddles min A. Min A x2 with verbal cues for stand to sit in chair. Increased time/effort noted.                        Cognition  Overall Cognitive Status: Exceptions  Arousal/Alertness: Delayed responses to Sarah, OTR/L  Co-tx with PT d/t safety and assist level required.  Time adjusted accordingly

## 2021-09-17 NOTE — PROGRESS NOTES
[facility]  Office: [address], DO, Terri Lemus, DO, Rivas Patee, DO, Merari Spire Blood, DO, Akhil Dalton MD, Susan Castellon MD, Noel Horton MD, Anabela Wilkinson MD, Geremias Nickerson MD, Graciela Ibanez MD, Ward Carey MD, Damon Sofia, DO, Dalila Scarce, DO, Pedro Mcconnell MD,  Tiffany Rota, DO, Gilberto Maxwell MD, Jefe Floyd MD, Bryson James MD, Amandeep Amador MD, , Priyank Basilio MD, Arabella Domingo MD, Lobo Rudolph MD, Joaquín Garrison, [facility], [facility] Moni, CNP, Ender Nicole, CNP, Timmy Guevara, CNS, Nazario Fraire, CNP, Hiwot Arteaga, CNP, Wilder Man, CNP, Heddy Sacks, CNP, Jennifer Deng, CNP, Leilani Shah PA-C, Connie Cope [facility], Gabby Paul, CNP, Bess Dee, CNP, Ab Jaime, CNP, Charles Donaldson, CNP, Alisha Oakley, CNP, Abraham Tarango, CNP, Scott Ballesteros, CNP, Eduardo [address]    Progress Note    9/17/2021    9:25 AM    Name:   Alison Rodriguez  MRN:     [id number]     Acct:      [de-identified]   Room:   [address] [place] [address] Day:  2  Admit Date:  9/15/2021  7:01 PM    PCP:   Angel Padilla MD  Code Status:  Full Code    Subjective:     C/C: Fall    Interval History Status: improved. Pt is resting in bed, she denies any pain. She slept well last night. She ate breakfast okay. Brief History: Alison Rodriguez is a 80 y.o. Non- / non  female who presents with No chief complaint on file. and is admitted to the hospital for the management of Closed fracture of multiple pubic rami, left, initial encounter ([facility] [address]    Patient is a pleasant 60-year-old  female with PMH of hypertension, anxiety, and recent bowel obstruction status post exlap presented to the ED status post fall. Patient found to have multiple fractures of the pelvis. Patient son is at bedside, however daughter is [address]  Patient's son provides history that patient is in assisted living. Apparently she got up to use the bathroom overnight and fell. She denies any pain, cough or shortness of breath. Since her bowel obstruction surgery in August, she has lost some weight but continues to eat small 3 meals a day. She has been admitted for evaluation by orthopedic surgery. Review of Systems:     Constitutional:  negative for chills, fevers, sweats  Respiratory:  negative for cough, dyspnea on exertion, shortness of breath, wheezing  Cardiovascular:  negative for chest pain, chest pressure/discomfort, lower extremity edema, palpitations  Gastrointestinal:  negative for abdominal pain, constipation, diarrhea, nausea, vomiting  Neurological:  negative for dizziness, headache    Medications: Allergies:     Allergies   Allergen Reactions    Bactrim [Sulfamethoxazole-Trimethoprim]     Flagyl [Metronidazole]        Current Meds:   Scheduled Meds:    mirtazapine  7.5 mg Oral Nightly    amLODIPine  5 mg Oral Daily    calcium carbonate-vitamin D  1 tablet Oral BID    cloNIDine  0.3 mg Oral Nightly    fluticasone  1 spray Each Nostril Daily    levothyroxine  50 mcg Oral Daily    metoprolol succinate  25 mg Oral Daily    PARoxetine  20 mg Oral Nightly    predniSONE  10 mg Oral Daily    pyridoxine  100 mg Oral Daily    atorvastatin  10 mg Oral Daily    vitamin B-12  500 mcg Oral Daily    vitamin E  400 Units Oral Daily    sennosides-docusate sodium  2 tablet Oral Daily    sodium chloride flush  5-40 mL IntraVENous 2 times per day    enoxaparin  40 mg SubCUTAneous Daily    famotidine  20 mg Oral BID     Continuous Infusions:    sodium chloride 50 mL/hr at 09/17/21 0813    sodium chloride       PRN Meds: sodium chloride flush, sodium chloride, potassium chloride **OR** potassium alternative oral replacement **OR** potassium chloride, magnesium sulfate, ondansetron **OR** ondansetron, polyethylene glycol, acetaminophen **OR** acetaminophen, HYDROcodone 5 mg - acetaminophen **OR** HYDROcodone 5 mg - acetaminophen, fentanNYL    Data:     Past Medical History:   has a past medical history of Anxiety neurosis, Cataract, History of peritonitis, Hyperlipidemia, Hypertension, Macular edema, Pelvis fracture ([address] Pseudophakos, and Renal insufficiency. Social History:   reports that she has never smoked. She has never used smokeless tobacco. She reports that she does not drink alcohol and does not use drugs. Family History:   Family History   Problem Relation Age of Onset    Cancer Mother         (unknown type)    Cancer Sister         (mediastinal cancer - unknown type).  Cancer Brother         (Stomach).  Cervical Cancer Neg Hx     Glaucoma Neg Hx        Vitals:  BP (!) 144/80   Pulse 83   Temp 98.6 °F (37 °C) (Oral)   Resp 18   Wt 101 lb 6.6 oz (46 kg)   SpO2 93%   BMI 16.88 kg/m²   Temp (24hrs), Av.9 °F (37.2 °C), Min:98.6 °F (37 °C), Max:99.2 °F (37.3 °C)    No results for input(s): POCGLU in the last 72 hours. I/O (24Hr):     Intake/Output Summary (Last 24 hours) at 2021 0925  Last data filed at 2021 0813  Gross per 24 hour   Intake 1088.33 ml   Output 1200 ml   Net -111.67 ml       Labs:  Hematology:  Recent Labs     09/15/21  0821  0601   WBC 15.3* 10.2   RBC 4.18 4.43   HGB 12.6 13.3   HCT 37.9 40.2   MCV 90.6 90.6   MCH 30.2 30.0   MCHC 33.3 33.1   RDW 14.8 15.1    259   MPV 8.4 9.1   INR  --  1.0     Chemistry:  Recent Labs     09/15/21  0822 21  0601    134*   K 3.9 4.5   CL 99 97*   CO2 30 25   GLUCOSE 126* 120*   BUN 33* 24*   CREATININE 0.80 0.91*   ANIONGAP 8* 12   LABGLOM >60 58*   GFRAA >60 >60   CALCIUM 9.4 9.7     Recent Labs     09/15/21  0822   PROT 8.4*   LABALBU 3.9   AST 25   ALT 10   ALKPHOS 85   BILITOT 0.30     ABG:No results found for: POCPH, PHART, PH, POCPCO2, FAM3FBQ, PCO2, POCPO2, PO2ART, PO2, POCHCO3, GVR7CXQ, HCO3, NBEA, PBEA, BEART, BE, THGBART, THB, XEG4KKL, SJPT4GKO, C4GMOTAL, O2SAT, FIO2  Lab Results   Component Value Date/Time    SPECIAL NOT REPORTED 07/23/2018 07:23 AM     Lab Results   Component Value Date/Time    CULTURE NO SIGNIFICANT GROWTH 07/23/2018 07:23 AM       Radiology:  XR SHOULDER RIGHT (MIN 2 VIEWS)    Result Date: 9/15/2021  No acute bony abnormalities are noted     XR HIP LEFT (2-3 VIEWS)    Result Date: 9/15/2021  Moderately displaced acute mid to anterior left superior and inferior pubic ramus fractures. Fracture extends to the symphysis. Old healed fractures of the right pubic rami. Postop changes in the pelvis. CT HEAD WO CONTRAST    Result Date: 9/15/2021  1. No clear evidence for acute intracranial hemorrhage or midline shift. 2. Mild atrophy. Mild chronic small vessel ischemic white matter disease. 3. Atherosclerotic calcification of the parasellar carotid arteries. CT ABDOMEN PELVIS W IV CONTRAST Additional Contrast? None    Result Date: 9/15/2021  No visceral traumatic injury in the abdomen or pelvis. New left comminuted moderately displaced superior pubic ramus fracture extending to the pubic body and nondisplaced inferior pubic ramus fracture. Comminuted essentially nondisplaced fracture of the left iliac wing extending to the sacroiliac joint anteriorly. No pubic symphysis diastasis or widening of the sacroiliac joints. Moderate regional left pelvic deep/muscular hematoma related to the fractures. No contrast extravasation to indicate active hemorrhage. No free pelvic fluid to indicate bladder rupture.        Physical Examination:       General appearance:  alert, cooperative and no distress  Mental Status:  oriented to person, place only, flat affect  Lungs:  clear to auscultation bilaterally, normal effort  Heart:  regular rate and rhythm, no murmur  Abdomen:  soft, nontender, nondistended, normal bowel sounds, no masses, hepatomegaly, splenomegaly  Extremities:  trace edema bilat LE, no redness, tenderness in the calves  Skin:  no gross lesions, rashes, induration    Assessment:     Hospital Problems         Last Modified POA    * (Principal) Closed fracture of multiple pubic rami, left, initial encounter ([address] 9/17/2021 Yes    Hypertension 9/17/2021 Yes    Osteoporosis 9/17/2021 Yes    Closed fracture of iliac crest, left, initial encounter ([address] 9/16/2021 Yes    Severe protein-calorie malnutrition ([facility] 9/17/2021 Yes          Plan:        1. Multiple fractures of pelvis, status post fall-appreciate orthopedic surgery evaluation, nonsurgical. PT/OT, pain control with Tylenol prn, pt doesn't like narcotics and they make her constipated  2. Hypertension-BP stable  3. Severe protein calorie malnutrition-try Remeron 7.5 mg p.o. nightly  4. Osteoporosis- Calcium and Vit D, add on Vit D level  5. Constipation- add Senokot-S  6. Gi/ DVT proph  7. PT/OT  8. Full code-  DORIAN initiated, called daughter, Mary Trujillo. Family agrees with DNR CCA, but want to talk to patient about it first.  She will have PCP change her code status at the facility    Okay to DC to SNF when arrangements made today.       Ronda Varghese MD  9/17/2021  9:25 AM

## 2021-09-17 NOTE — DISCHARGE SUMMARY
[facility]  Office: [address], DO, Abbe Gear, DO, Lotus Field, DO, Amber Sandy Blood, DO, Monica Dukes MD, Yancy Weeks MD, Natalee August MD, Daniel Mae MD, Puja Kang MD, Michelle Petit MD, Miracle Galaviz MD, Rivka David, DO, Sandy Clark, DO, Malu Posada MD,  Felicia Dillard, DO, Antonina Rider MD, Steven Roa MD, Lisa Oconnor MD, Brenda Mojica MD, , Ese Kelley MD, Morelia Walker MD, Carole Castaneda MD, Jennifer Cheatham [facility], [facility], CNP, Zan Laird, CNP, Vivi Mask, CNS, Jaylon Stonington, CNP, Zoya Menchaca, CNP, Erickson Kaplan, CNP, Elder Self, CNP, Kj Morales, CNP, Lendel Nissen, PA-C, Rianna Alfaro, [facility], Leidy Monson, CNP, Carrol Miranda, CNP, Susanne Marina, CNP, Jesu Mcdaniel, CNP, Marivel Cuevas, CNP, Riya Mejía, CNP, Corine Marie, CNP, Eduardo [address]    Discharge Summary     Patient ID: Ángela Rolon  :     MRN: [id number]     ACCOUNT:  [de-identified]   Patient's PCP: Florecita Hull MD  Admit Date: 9/15/2021   Discharge Date: 2021     Length of Stay: 2  Code Status:  Full Code  Admitting Physician: Brandon Buck MD  Discharge Physician: Brandon Buck MD     Active Discharge Diagnoses:     Hospital Problem Lists:  Principal Problem:    Closed fracture of multiple pubic rami, left, initial encounter [facility])  Active Problems:    Hypertension    Osteoporosis    Closed fracture of iliac crest, left, initial encounter ([facility] [address]    Severe protein-calorie malnutrition ([facility] [address]  Resolved Problems:    * No resolved hospital problems. *      Admission Condition:  poor     Discharged Condition: fair    Hospital Stay:     Hospital Course: Ángela Rolon is a 80 y.o. female who was admitted for the management of  Closed fracture of multiple pubic rami, left, initial encounter [facility]) , presented to ER with Fall.     Ángela Rolon is a 80 Postop changes in the pelvis. CT HEAD WO CONTRAST    Result Date: 9/15/2021  1. No clear evidence for acute intracranial hemorrhage or midline shift. 2. Mild atrophy. Mild chronic small vessel ischemic white matter disease. 3. Atherosclerotic calcification of the parasellar carotid arteries. CT ABDOMEN PELVIS W IV CONTRAST Additional Contrast? None    Result Date: 9/15/2021  No visceral traumatic injury in the abdomen or pelvis. New left comminuted moderately displaced superior pubic ramus fracture extending to the pubic body and nondisplaced inferior pubic ramus fracture. Comminuted essentially nondisplaced fracture of the left iliac wing extending to the sacroiliac joint anteriorly. No pubic symphysis diastasis or widening of the sacroiliac joints. Moderate regional left pelvic deep/muscular hematoma related to the fractures. No contrast extravasation to indicate active hemorrhage. No free pelvic fluid to indicate bladder rupture. Consultations:    Consults:     Final Specialist Recommendations/Findings:   IP CONSULT TO ORTHOPEDIC SURGERY      The patient was seen and examined on day of discharge and this discharge summary is in conjunction with any daily progress note from day of discharge. Discharge plan:     Disposition:  To a non-[facility] facility    Physician Follow Up:     MARY [facility] [address] [place] [address]          Diet: regular diet    Activity: As tolerated    Discharge Medications:      Medication List      START taking these medications    enoxaparin 40 MG/0.4ML injection  Commonly known as: LOVENOX  Inject 0.4 mLs into the skin daily  Start taking on: September 18, 2021     mirtazapine 7.5 MG tablet  Commonly known as: REMERON  Take 1 tablet by mouth nightly     polyethylene glycol 17 g packet  Commonly known as: GLYCOLAX  Take 17 g by mouth daily     sennosides-docusate sodium 8.6-50 MG tablet  Commonly known as: SENOKOT-S  Take 2 tablets by mouth daily  Start taking on: September 18, 2021        CONTINUE taking these medications    acetaminophen 325 MG tablet  Commonly known as: TYLENOL  Take 2 tablets by mouth every 4 hours as needed for Pain or Fever     amLODIPine 5 MG tablet  Commonly known as: NORVASC     * Bath Bench with Back Misc  1 Device by Does not apply route 2 times daily     * [facility]. [address] bed      DX: cervical fracture with delayed healing     * Walker Misc  Rolling walker with seat     Caltrate 600+D 600-400 MG-UNIT Tabs per tab  Generic drug: calcium carbonate-vitamin D     cloNIDine 0.3 MG tablet  Commonly known as: CATAPRES     fluticasone 50 MCG/ACT nasal spray  Commonly known as: Flonase  1 spray each nostril daily. levothyroxine 50 MCG tablet  Commonly known as: SYNTHROID  TAKE ONE TABLET BY MOUTH DAILY     metoprolol succinate 25 MG extended release tablet  Commonly known as: TOPROL XL     pantoprazole 40 MG tablet  Commonly known as: PROTONIX     PARoxetine 20 MG tablet  Commonly known as: PAXIL  Take 1 tablet by mouth nightly TAKE ONE TABLET BY MOUTH DAILY     predniSONE 10 MG tablet  Commonly known as: DELTASONE     pyridoxine 100 MG tablet  Commonly known as: B-6     simvastatin 10 MG tablet  Commonly known as: ZOCOR  TAKE ONE TABLET BY MOUTH EVERY EVENING     spironolactone 25 MG tablet  Commonly known as: Aldactone  Take 1 tablet by mouth daily     vitamin B-12 500 MCG tablet  Commonly known as: CYANOCOBALAMIN     vitamin E 400 UNIT capsule         * This list has 3 medication(s) that are the same as other medications prescribed for you. Read the directions carefully, and ask your doctor or other care provider to review them with you.             STOP taking these medications    furosemide 20 MG tablet  Commonly known as: LASIX           Where to Get Your Medications      Information about where to get these medications is not yet available    Ask your nurse or doctor about these medications  · enoxaparin 40 MG/0.4ML injection  · mirtazapine 7.5 MG tablet  · PARoxetine 20 MG tablet  · polyethylene glycol 17 g packet  · sennosides-docusate sodium 8.6-50 MG tablet  · spironolactone 25 MG tablet         No discharge procedures on file. Time Spent on discharge is  20 mins in patient examination, evaluation, counseling as well as medication reconciliation, prescriptions for required medications, discharge plan and follow up. Electronically signed by   Shannon Ulloa MD  9/17/2021  11:25 AM      Thank you Dr. Oksana Muniz MD for the opportunity to be involved in this patient's care.

## 2021-09-28 ENCOUNTER — TELEPHONE (OUTPATIENT)
Dept: INTERNAL MEDICINE | Age: 86
End: 2021-09-28
Payer: MEDICARE

## 2021-09-28 DIAGNOSIS — E03.9 ACQUIRED HYPOTHYROIDISM: ICD-10-CM

## 2021-09-28 DIAGNOSIS — M81.0 OSTEOPOROSIS, UNSPECIFIED OSTEOPOROSIS TYPE, UNSPECIFIED PATHOLOGICAL FRACTURE PRESENCE: ICD-10-CM

## 2021-09-28 DIAGNOSIS — F32.9 MAJOR DEPRESSIVE DISORDER, REMISSION STATUS UNSPECIFIED, UNSPECIFIED WHETHER RECURRENT: ICD-10-CM

## 2021-09-28 DIAGNOSIS — I25.10 CORONARY ARTERY DISEASE DUE TO LIPID RICH PLAQUE: ICD-10-CM

## 2021-09-28 DIAGNOSIS — I25.83 CORONARY ARTERY DISEASE DUE TO LIPID RICH PLAQUE: ICD-10-CM

## 2021-09-28 DIAGNOSIS — I10 ESSENTIAL HYPERTENSION: Primary | ICD-10-CM

## 2021-09-28 PROCEDURE — G0180 MD CERTIFICATION HHA PATIENT: HCPCS | Performed by: INTERNAL MEDICINE

## 2022-07-11 ENCOUNTER — HOSPITAL ENCOUNTER (OUTPATIENT)
Dept: MAMMOGRAPHY | Age: 87
Discharge: HOME | End: 2022-07-13
Payer: MEDICARE

## 2022-07-11 DIAGNOSIS — Z12.31 VISIT FOR SCREENING MAMMOGRAM: ICD-10-CM

## 2022-07-11 PROCEDURE — 77063 BREAST TOMOSYNTHESIS BI: CPT

## 2022-07-14 NOTE — ED NOTES
Labeled urine and labeled NP swab walked to lab.         Damir Morin RN  09/15/21 1631 [de-identified] : Karla Wilson is a 25 yo female who presents for evaluation of left aural fullness. She notes that this has intermittently been going for the past few years. She has attempted using peroxide in the past for cerumen impaciton but also uses Q-tips. She was on a cruise two weeks ago and was swimming, and since then, her left ear has been clogged. She denies otalgia, otorrhea. She notes a high pitched ringing in the left ear. She notes muffled hearing from the left. She denies vertigo. She denies history of recurrent ear infections. She denies recent fevers, chills. She denies family history of early onset hearing loss or occupational noise exposure. She denies exposure to ototoxic medications.